# Patient Record
Sex: FEMALE | Race: WHITE | Employment: UNEMPLOYED | URBAN - METROPOLITAN AREA
[De-identification: names, ages, dates, MRNs, and addresses within clinical notes are randomized per-mention and may not be internally consistent; named-entity substitution may affect disease eponyms.]

---

## 2017-02-15 ENCOUNTER — HOSPITAL ENCOUNTER (OUTPATIENT)
Dept: WOMENS IMAGING | Age: 72
Discharge: HOME | End: 2017-02-15
Attending: OBSTETRICS & GYNECOLOGY | Admitting: OBSTETRICS & GYNECOLOGY

## 2017-02-15 DIAGNOSIS — Z12.31 VISIT FOR SCREENING MAMMOGRAM: ICD-10-CM

## 2017-11-16 ENCOUNTER — HOSPITAL ENCOUNTER (OUTPATIENT)
Dept: CT IMAGING | Age: 72
Discharge: HOME | End: 2017-11-16
Attending: UROLOGY | Admitting: UROLOGY

## 2017-11-16 DIAGNOSIS — N15.1 ABSCESS OF LEFT KIDNEY: ICD-10-CM

## 2017-11-16 DIAGNOSIS — N15.1 RENAL AND PERINEPHRIC ABSCESS: ICD-10-CM

## 2017-11-16 LAB
GFR AFRICAN AMERICAN: >60
GFR NON-AFRICAN AMERICAN: >60
PERFORMED ON: NORMAL
POC CREATININE: 0.9 MG/DL (ref 0.6–1.2)
POC SAMPLE TYPE: NORMAL

## 2018-02-19 ENCOUNTER — HOSPITAL ENCOUNTER (OUTPATIENT)
Dept: WOMENS IMAGING | Age: 73
Discharge: HOME | End: 2018-02-19
Attending: OBSTETRICS & GYNECOLOGY | Admitting: OBSTETRICS & GYNECOLOGY

## 2018-02-19 DIAGNOSIS — Z12.31 VISIT FOR SCREENING MAMMOGRAM: ICD-10-CM

## 2019-04-10 ENCOUNTER — HOSPITAL ENCOUNTER (OUTPATIENT)
Dept: WOMENS IMAGING | Age: 74
Discharge: HOME | End: 2019-04-10
Payer: MEDICARE

## 2019-04-10 DIAGNOSIS — Z12.39 BREAST CANCER SCREENING: ICD-10-CM

## 2019-04-10 PROCEDURE — 77063 BREAST TOMOSYNTHESIS BI: CPT

## 2019-08-16 ENCOUNTER — ANESTHESIA EVENT (OUTPATIENT)
Dept: ENDOSCOPY | Age: 74
End: 2019-08-16
Payer: MEDICARE

## 2019-08-19 ENCOUNTER — HOSPITAL ENCOUNTER (OUTPATIENT)
Age: 74
Setting detail: OUTPATIENT SURGERY
Discharge: HOME | End: 2019-08-19
Attending: INTERNAL MEDICINE | Admitting: INTERNAL MEDICINE
Payer: MEDICARE

## 2019-08-19 ENCOUNTER — ANESTHESIA (OUTPATIENT)
Dept: ENDOSCOPY | Age: 74
End: 2019-08-19
Payer: MEDICARE

## 2019-08-19 VITALS
OXYGEN SATURATION: 94 % | BODY MASS INDEX: 26.05 KG/M2 | DIASTOLIC BLOOD PRESSURE: 68 MMHG | SYSTOLIC BLOOD PRESSURE: 127 MMHG | WEIGHT: 156.38 LBS | RESPIRATION RATE: 16 BRPM | HEART RATE: 67 BPM | HEIGHT: 65 IN | TEMPERATURE: 97 F

## 2019-08-19 VITALS — DIASTOLIC BLOOD PRESSURE: 75 MMHG | OXYGEN SATURATION: 94 % | SYSTOLIC BLOOD PRESSURE: 98 MMHG

## 2019-08-19 PROCEDURE — 7100000010 HC PHASE II RECOVERY - FIRST 15 MIN: Performed by: INTERNAL MEDICINE

## 2019-08-19 PROCEDURE — 2580000003 HC RX 258: Performed by: ANESTHESIOLOGY

## 2019-08-19 PROCEDURE — 7100000011 HC PHASE II RECOVERY - ADDTL 15 MIN: Performed by: INTERNAL MEDICINE

## 2019-08-19 PROCEDURE — 2500000003 HC RX 250 WO HCPCS: Performed by: NURSE ANESTHETIST, CERTIFIED REGISTERED

## 2019-08-19 PROCEDURE — 6360000002 HC RX W HCPCS: Performed by: NURSE ANESTHETIST, CERTIFIED REGISTERED

## 2019-08-19 PROCEDURE — 3609009500 HC COLONOSCOPY DIAGNOSTIC OR SCREENING: Performed by: INTERNAL MEDICINE

## 2019-08-19 PROCEDURE — 3700000000 HC ANESTHESIA ATTENDED CARE: Performed by: INTERNAL MEDICINE

## 2019-08-19 PROCEDURE — 3700000001 HC ADD 15 MINUTES (ANESTHESIA): Performed by: INTERNAL MEDICINE

## 2019-08-19 RX ORDER — SODIUM CHLORIDE 0.9 % (FLUSH) 0.9 %
10 SYRINGE (ML) INJECTION PRN
Status: DISCONTINUED | OUTPATIENT
Start: 2019-08-19 | End: 2019-08-19 | Stop reason: HOSPADM

## 2019-08-19 RX ORDER — PROPOFOL 10 MG/ML
INJECTION, EMULSION INTRAVENOUS PRN
Status: DISCONTINUED | OUTPATIENT
Start: 2019-08-19 | End: 2019-08-19 | Stop reason: SDUPTHER

## 2019-08-19 RX ORDER — SODIUM CHLORIDE 9 MG/ML
INJECTION, SOLUTION INTRAVENOUS CONTINUOUS
Status: DISCONTINUED | OUTPATIENT
Start: 2019-08-19 | End: 2019-08-19 | Stop reason: HOSPADM

## 2019-08-19 RX ORDER — SODIUM CHLORIDE 0.9 % (FLUSH) 0.9 %
10 SYRINGE (ML) INJECTION EVERY 12 HOURS SCHEDULED
Status: DISCONTINUED | OUTPATIENT
Start: 2019-08-19 | End: 2019-08-19 | Stop reason: HOSPADM

## 2019-08-19 RX ORDER — LIDOCAINE HYDROCHLORIDE 20 MG/ML
INJECTION, SOLUTION EPIDURAL; INFILTRATION; INTRACAUDAL; PERINEURAL PRN
Status: DISCONTINUED | OUTPATIENT
Start: 2019-08-19 | End: 2019-08-19 | Stop reason: SDUPTHER

## 2019-08-19 RX ADMIN — PROPOFOL 20 MG: 10 INJECTION, EMULSION INTRAVENOUS at 08:45

## 2019-08-19 RX ADMIN — PROPOFOL 20 MG: 10 INJECTION, EMULSION INTRAVENOUS at 08:34

## 2019-08-19 RX ADMIN — PROPOFOL 10 MG: 10 INJECTION, EMULSION INTRAVENOUS at 08:37

## 2019-08-19 RX ADMIN — PROPOFOL 20 MG: 10 INJECTION, EMULSION INTRAVENOUS at 08:44

## 2019-08-19 RX ADMIN — PROPOFOL 20 MG: 10 INJECTION, EMULSION INTRAVENOUS at 08:41

## 2019-08-19 RX ADMIN — PROPOFOL 40 MG: 10 INJECTION, EMULSION INTRAVENOUS at 08:38

## 2019-08-19 RX ADMIN — PROPOFOL 20 MG: 10 INJECTION, EMULSION INTRAVENOUS at 08:47

## 2019-08-19 RX ADMIN — SODIUM CHLORIDE: 0.9 INJECTION, SOLUTION INTRAVENOUS at 08:08

## 2019-08-19 RX ADMIN — LIDOCAINE HYDROCHLORIDE 60 MG: 20 INJECTION, SOLUTION EPIDURAL; INFILTRATION; INTRACAUDAL; PERINEURAL at 08:32

## 2019-08-19 RX ADMIN — PROPOFOL 40 MG: 10 INJECTION, EMULSION INTRAVENOUS at 08:32

## 2019-08-19 ASSESSMENT — PAIN SCALES - WONG BAKER
WONGBAKER_NUMERICALRESPONSE: 0
WONGBAKER_NUMERICALRESPONSE: 0

## 2019-08-19 ASSESSMENT — LIFESTYLE VARIABLES: SMOKING_STATUS: 0

## 2019-08-19 ASSESSMENT — PAIN - FUNCTIONAL ASSESSMENT: PAIN_FUNCTIONAL_ASSESSMENT: 0-10

## 2019-08-19 ASSESSMENT — PAIN SCALES - GENERAL: PAINLEVEL_OUTOF10: 0

## 2020-07-10 ENCOUNTER — HOSPITAL ENCOUNTER (OUTPATIENT)
Dept: WOMENS IMAGING | Age: 75
Discharge: HOME | End: 2020-07-10
Payer: MEDICARE

## 2020-07-10 PROCEDURE — 77063 BREAST TOMOSYNTHESIS BI: CPT

## 2020-08-27 ENCOUNTER — HOSPITAL ENCOUNTER (OUTPATIENT)
Dept: ULTRASOUND IMAGING | Age: 75
Discharge: HOME | End: 2020-08-27
Payer: MEDICARE

## 2020-08-27 PROCEDURE — 76770 US EXAM ABDO BACK WALL COMP: CPT

## 2020-09-18 ENCOUNTER — HOSPITAL ENCOUNTER (OUTPATIENT)
Dept: CT IMAGING | Age: 75
Discharge: HOME | End: 2020-09-18
Payer: MEDICARE

## 2020-09-18 PROCEDURE — 74178 CT ABD&PLV WO CNTR FLWD CNTR: CPT

## 2020-09-18 PROCEDURE — 82565 ASSAY OF CREATININE: CPT

## 2020-09-18 PROCEDURE — 6360000004 HC RX CONTRAST MEDICATION: Performed by: UROLOGY

## 2020-09-18 RX ADMIN — IOPAMIDOL 75 ML: 755 INJECTION, SOLUTION INTRAVENOUS at 09:40

## 2021-07-12 ENCOUNTER — HOSPITAL ENCOUNTER (OUTPATIENT)
Dept: WOMENS IMAGING | Age: 76
Discharge: HOME | End: 2021-07-12
Payer: MEDICARE

## 2021-07-12 DIAGNOSIS — Z12.31 VISIT FOR SCREENING MAMMOGRAM: ICD-10-CM

## 2021-07-12 PROCEDURE — 77063 BREAST TOMOSYNTHESIS BI: CPT

## 2021-08-01 ENCOUNTER — APPOINTMENT (OUTPATIENT)
Dept: GENERAL RADIOLOGY | Age: 76
End: 2021-08-01
Payer: MEDICARE

## 2021-08-01 ENCOUNTER — HOSPITAL ENCOUNTER (EMERGENCY)
Age: 76
Discharge: HOME | End: 2021-08-01
Attending: EMERGENCY MEDICINE
Payer: MEDICARE

## 2021-08-01 VITALS
WEIGHT: 150 LBS | OXYGEN SATURATION: 93 % | TEMPERATURE: 98.7 F | HEIGHT: 64 IN | SYSTOLIC BLOOD PRESSURE: 125 MMHG | HEART RATE: 88 BPM | BODY MASS INDEX: 25.61 KG/M2 | DIASTOLIC BLOOD PRESSURE: 66 MMHG | RESPIRATION RATE: 16 BRPM

## 2021-08-01 DIAGNOSIS — S62.91XA CLOSED FRACTURE OF RIGHT HAND, INITIAL ENCOUNTER: Primary | ICD-10-CM
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
## 2021-08-01 PROCEDURE — 73130 X-RAY EXAM OF HAND: CPT

## 2021-08-01 PROCEDURE — 29125 APPL SHORT ARM SPLINT STATIC: CPT

## 2021-08-01 PROCEDURE — 99283 EMERGENCY DEPT VISIT LOW MDM: CPT

## 2021-08-01 ASSESSMENT — PAIN DESCRIPTION - ONSET: ONSET: SUDDEN

## 2021-08-01 ASSESSMENT — PAIN DESCRIPTION - LOCATION: LOCATION: HAND

## 2021-08-01 ASSESSMENT — PAIN DESCRIPTION - FREQUENCY: FREQUENCY: CONTINUOUS

## 2021-08-01 ASSESSMENT — PAIN DESCRIPTION - PAIN TYPE: TYPE: ACUTE PAIN

## 2021-08-01 ASSESSMENT — PAIN DESCRIPTION - ORIENTATION: ORIENTATION: RIGHT

## 2021-08-01 ASSESSMENT — PAIN SCALES - GENERAL: PAINLEVEL_OUTOF10: 5

## 2021-08-02 ENCOUNTER — TELEPHONE (OUTPATIENT)
Dept: ORTHOPEDIC SURGERY | Age: 76
End: 2021-08-02

## 2021-08-02 NOTE — ED PROVIDER NOTES
ED Attending Attestation Note     Date of evaluation: 8/1/2021    This patient was seen by the resident. I have seen and examined the patient, agree with the workup, evaluation, management and diagnosis. The care plan has been discussed. My assessment reveals a very well-appearing elderly patient, pleasantly conversational, in no acute distress. She presents with pain and swelling primarily over the dorsal ulnar aspect of the right hand, with associated tenderness to palpation. She has no tenderness over the wrist, forearm, or shoulder, and denies any other areas of pain. Her hand is neurovascularly intact distally. Plain films show nondisplaced fractures at the bases of the right fourth and fifth proximal phalanges. I did supervise and participate in the placement of an ulnar gutter splint, together with the resident physician.          Jose Mckinnon MD  08/05/21 3039

## 2021-08-02 NOTE — ED PROVIDER NOTES
[facility] RESIDENT NOTE       Date of evaluation: 8/1/2021    Chief Complaint     Fall (with right hand pain. last 2 knuckles are red and swollen. Fall was from a bent over position, and fell into a shrub and mulch. denies hitting head or LOC or blood thinners. States that she just lost her balance. )      of Present Illness     Kamryn Blankenship is a 67 y.o. female, history of hypertension, anxiety, hyperlipidemia, who presents to the emergency department for evaluation of hand pain. Patient states that she was attempting to pull weeds when she fell onto her right side, landing on her right hand. She reportedly lay on the ground for approximately 30 minutes, as patient was unable to get up, which is reportedly not atypical per her  who is at the bedside. Patient has reportedly experienced mechanical falls in the past and required assistance to get up. Since the incident, patient has been experiencing increased pain and swelling to the right hand, and states that she had difficulty using utensils at dinner tonight secondary to pain, prompting her presentation to the emergency department. Upon arrival, patient rates her discomfort as 4/10 in severity, with movement of the hand exacerbating her discomfort. She reports mild \"tingling\" in the third, fourth and fifth digits on the right hand. Patient is right-handed. She denies any elbow pain, shoulder pain, head trauma, LOC, neck pain, abdominal pain, chest pain, shortness of breath, or any other injuries as result of the incident. She was reportedly in her normal state of health prior to the incident, denying any preceding lightheadedness, headache, chest pain, or shortness of breath. Review of Systems   A complete review of systems was conducted on this patient and was negative except as noted in the HPI.   Review of Systems    Past Medical, Surgical, Family, and Social History     She has a past medical history of Anxiety, Balance disorder, GERD (gastroesophageal reflux disease), History of kidney stones, Hx of blood clots, Hyperlipidemia, Hypertension, Infection, Liver cyst, Olfactory nerve injury, Right ear injury, and Thyroid disease. She has a past surgical history that includes Abdominoplasty (2012); Hysterectomy (1989); Facial cosmetic surgery (1986); Ovarian cyst surgery (1987); Uterine fibroid surgery (1974); back surgery (1998); Gastric Band (2011); Colonoscopy; Facial cosmetic surgery ([id number]); Cosmetic surgery; Cholecystectomy; Endoscopy, colon, diagnostic; eye surgery; other surgical history (11/24/2015); and Colonoscopy (N/A, 8/19/2019). Her family history is not on file. She reports that she has never smoked. She has never used smokeless tobacco. She reports that she does not drink alcohol and does not use drugs. Medications     Discharge Medication List as of 8/1/2021  9:48 PM      CONTINUE these medications which have NOT CHANGED    Details   metFORMIN (GLUCOPHAGE) 500 MG tablet Historical Med      !! lovastatin (MEVACOR) 40 MG tablet Historical Med      levothyroxine (SYNTHROID) 50 MCG tablet Historical Med      oxyCODONE-acetaminophen (PERCOCET) 5-325 MG per tablet Take 1 tablet by mouth every 4 hours as needed for Pain, Disp-30 tablet, R-0      valsartan-hydrochlorothiazide (DIOVAN-HCT) 160-25 MG per tablet Take 1 tablet by mouth daily      UNABLE TO FIND Nature-Thyroid 32.5 mg daily      oxyCODONE-acetaminophen (PERCOCET) 7.5-325 MG per tablet Take 1 tablet by mouth 2 times daily as needed for Pain      ibuprofen (ADVIL;MOTRIN) 200 MG tablet Take 200 mg by mouth every 6 hours as needed for Pain      docusate sodium (COLACE) 100 MG capsule Take 100 mg by mouth nightly. !! lovastatin (MEVACOR) 20 MG tablet Take 20 mg by mouth nightly. sertraline (ZOLOFT) 100 MG tablet Take 100 mg by mouth nightly. !! - Potential duplicate medications found.  Please discuss with provider. Allergies     She is allergic to demerol, erythromycin, and pcn [penicillins]. Physical Exam     INITIAL VITALS: BP: 125/66, Temp: 98.7 °F (37.1 °C), Pulse: 88, Resp: 16, SpO2: 93 %   Physical Exam  Vitals and nursing note reviewed. Constitutional:       General: She is not in acute distress. Appearance: Normal appearance. She is not ill-appearing or toxic-appearing. HENT:      Head: Normocephalic and atraumatic. Mouth/Throat:      Mouth: Mucous membranes are moist.   Eyes:      Conjunctiva/sclera: Conjunctivae normal.      Pupils: Pupils are equal, round, and reactive to light. Cardiovascular:      Rate and Rhythm: Normal rate and regular rhythm. Pulses: Normal pulses. Heart sounds: Normal heart sounds. Pulmonary:      Effort: Pulmonary effort is normal.      Breath sounds: Normal breath sounds. Abdominal:      Palpations: Abdomen is soft. Tenderness: There is no abdominal tenderness. There is no guarding. Musculoskeletal:      Right hand: Swelling (ulnar aspect), tenderness and bony tenderness present. Decreased range of motion. Decreased strength (2/2 pain). Normal capillary refill. Normal pulse. Cervical back: Normal range of motion. No rigidity or tenderness. Neurological:      General: No focal deficit present. Mental Status: She is alert. DiagnosticResults   RADIOLOGY:  XR HAND RIGHT (MIN 3 VIEWS)   Final Result      Possible acute fractures involving the bases of the right fourth and fifth proximal phalanges. Correlate for tenderness to palpation at this location. LABS:   No results found for this visit on 08/01/21. RECENT VITALS:  BP: 125/66, Temp: 98.7 °F (37.1 °C), Pulse: 88,Resp: 16, SpO2: 93 %     Procedures     Splint Application    Date/Time: 8/2/2021 3:38 AM  Performed by: Jayson Cabrera MD  Authorized by:  Selwyn Franco MD     Consent:     Consent obtained:  Verbal    Consent given by:  Patient    Risks discussed:  Numbness, pain and swelling  Pre-procedure details:     Sensation:  Normal    Skin color:  Ecchymosis  Procedure details:     Laterality:  Right    Location:  Hand    Hand:  R hand    Splint type:  Ulnar gutter    Supplies:  Ortho-Glass  Post-procedure details:     Pain:  Improved    Sensation:  Normal    Patient tolerance of procedure: Tolerated well, no immediate complications        ED Course     Nursing Notes, Past Medical Hx, Past Surgical Hx, Social Hx, Allergies, and Family Hx were reviewed. The patient was given the followingmedications:  No orders of the defined types were placed in this encounter. CONSULTS:  None    MEDICAL DECISION MAKING / ASSESSMENT / PLAN     Ran Cano is a 67 y.o. female, history of hypertension, anxiety, hyperlipidemia, presenting to the emergency department for evaluation of hand pain following a ground-level fall. Patient was attempting to pull weeds when she fell down, landing on her right side, sustaining an injury to her right hand. This occurred approximately 8 hours prior to arrival.  Her discomfort in the hand has gradually worsened, with associated swelling. She had difficulty eating dinner tonight secondary to the pain prompting her presentation to the emergency department. Fall sounds entirely mechanical, as patient adamantly denies any symptoms preceding the fall, and did not strike her head or experiencing LOC. Upon arrival, patient is well-appearing, afebrile and hemodynamically stable. Dilation of the right hand reveals swelling on medial aspect of the hand on both the volar and dorsal aspect. She has decreased strength secondary to pain and is endorsing mild tingling in the third, fourth, and fifth digits. She has normal capillary refill distal to the swelling. X-rays of the right hand were obtained which revealed fractures along the base of the fourth and fifth metacarpals.     She had no other gross injuries on exam and was complaining of no other discomfort, therefore no further imaging was obtained. Did not strike her head or experience LOC, therefore no head CT was indicated. Patient was placed in a ulnar gutter splint and referred to hand surgery for follow-up. This patient was also evaluated by the attending physician. All care plans werediscussed and agreed upon. Clinical Impression     1. Closed fracture of right hand, initial encounter    2.  Fall from ground level        Disposition     PATIENT REFERRED TO:  Russel Andrea MD  [address] [place] [address]  [address] [place] [address]  [phone]    Call   Tuesday, call tomorrow to confirm your appointment      DISCHARGE MEDICATIONS:  Discharge Medication List as of 8/1/2021  9:48 PM          DISPOSITION         Mary Warren MD  Resident  08/02/21 8219

## 2021-08-02 NOTE — ED NOTES
Patient prepared for and ready to be discharged. Patient discharged at this time in no acute distress after verbalizing understanding of discharge instructions. Patient left after receiving After Visit Summary instructions.       Marc Davila RN  08/01/21 0563

## 2021-08-02 NOTE — TELEPHONE ENCOUNTER
Called patient and her  back. Schedule an appointment tomorrow at [facility] for [phone]. They were given the address and informed to arrive early for check, paperwork and to have the ID and insurance card. They were also told that we are working them in that there will likely be a longer than normal wait time. They understood and still requested to proceed with the appointment scheduling. No further questions at this time.

## 2021-08-03 ENCOUNTER — OFFICE VISIT (OUTPATIENT)
Dept: ORTHOPEDIC SURGERY | Age: 76
End: 2021-08-03
Payer: MEDICARE

## 2021-08-03 VITALS — RESPIRATION RATE: 16 BRPM | BODY MASS INDEX: 25.61 KG/M2 | HEIGHT: 64 IN | WEIGHT: 150 LBS

## 2021-08-03 DIAGNOSIS — S69.91XA HAND INJURY, RIGHT, INITIAL ENCOUNTER: Primary | ICD-10-CM

## 2021-08-03 PROCEDURE — 26720 TREAT FINGER FRACTURE EACH: CPT | Performed by: ORTHOPAEDIC SURGERY

## 2021-08-03 PROCEDURE — L3809 WHFO W/O JOINTS PRE OTS: HCPCS | Performed by: ORTHOPAEDIC SURGERY

## 2021-08-03 PROCEDURE — 99203 OFFICE O/P NEW LOW 30 MIN: CPT | Performed by: ORTHOPAEDIC SURGERY

## 2021-08-04 NOTE — PROGRESS NOTES
CHIEF COMPLAINT: Right hand pain/ ring finger and short (pinkie) finger proximal phalanx base fracture. DATE OF INJURY: 8/1/2021, DOT 8/3/2021    HISTORY:  Ms. Ran Dennison 67 y.o.  female right handed presents today for the first visit for evaluation of a right hand injury which occurred when she fell as she was pulling weeds. She is complaining of ring finger and short (pinkie) finger pain and swelling. This is better with elevation and worse with ROM. The pain is sharp and not radiating. No other complaint.  She was seen at [facility], was evaluated and splinted and asked to see orthopedics    Past Medical History:   Diagnosis Date    Anxiety     Balance disorder     unknown etiology    GERD (gastroesophageal reflux disease)     History of kidney stones     Hx of blood clots     x 1 from use of BCPs    Hyperlipidemia     Hypertension     Infection 11/2015    gastric band    Liver cyst     \"possible\"    Olfactory nerve injury     mostly resolved    Right ear injury 20 yrs ago    Thyroid disease        Past Surgical History:   Procedure Laterality Date    ABDOMINOPLASTY  2012    also cosmetic surgery on arms    BACK SURGERY  1998    exc. cyst lumbar    CHOLECYSTECTOMY      COLONOSCOPY      COLONOSCOPY N/A 8/19/2019    COLONOSCOPY DIAGNOSTIC performed by Viki Brannon MD at [address] [place] [address]      breast reduction    ENDOSCOPY, COLON, DIAGNOSTIC      EYE SURGERY      cataracts    [facility]ACIAL COSMETIC [facility]    also breast reduction    FACIAL COSMETIC SURGERY  [id number]     FACELIFT, BROWLIFT, UPPER AND LOWER BLEPHEROPLASTY AND    GASTRIC BAND  2011    also port removal subsequently (3/2015)   [address]  11/24/2015    LAPAROSCOPIC GASTRIC BAND REMOVAL         OVARIAN CYST SURGERY  1987    UTERINE FIBROID SURGERY  1974       Social History     Socioeconomic History    Marital status:      Spouse name: Not on file    Number of children: Not on file    Years of education: Not on file    Highest education level: Not on file   Occupational History    Not on file   Tobacco Use    Smoking status: Never Smoker    Smokeless tobacco: Never Used   Substance and Sexual Activity    Alcohol use: No    Drug use: No    Sexual activity: Not on file   Other Topics Concern    Not on file   Social History Narrative    Not on file     Social Determinants of Health     Financial Resource Strain:     Difficulty of Paying Living Expenses:    Food Insecurity:     Worried About Running Out of Food in the Last Year:     [address] in the Last Year:    Transportation Needs:     Lack of Transportation (Medical):  Lack of Transportation (Non-Medical):    Physical Activity:     Days of Exercise per Week:     Minutes of Exercise per Session:    Stress:     Feeling of Stress :    Social Connections:     Frequency of Communication with Friends and Family:     Frequency of Social Gatherings with Friends and Family:     Attends Mu-[employer] Services:     Active Member of Clubs or Organizations:     Attends Club or Organization Meetings:     Marital Status:    Intimate Partner Violence:     Fear of Current or Ex-Partner:     Emotionally Abused:     Physically Abused:     Sexually Abused:        History reviewed. No pertinent family history.     Current Outpatient Medications on File Prior to Visit   Medication Sig Dispense Refill    metFORMIN (GLUCOPHAGE) 500 MG tablet       lovastatin (MEVACOR) 40 MG tablet       levothyroxine (SYNTHROID) 50 MCG tablet       oxyCODONE-acetaminophen (PERCOCET) 5-325 MG per tablet Take 1 tablet by mouth every 4 hours as needed for Pain 30 tablet 0    valsartan-hydrochlorothiazide (DIOVAN-HCT) 160-25 MG per tablet Take 1 tablet by mouth daily      UNABLE TO FIND Nature-Thyroid 32.5 mg daily      oxyCODONE-acetaminophen (PERCOCET) 7.5-325 MG per tablet Take 1 tablet by mouth 2 times daily as needed for Pain      ibuprofen (ADVIL;MOTRIN) 200 MG tablet Take 200 mg by mouth every 6 hours as needed for Pain      docusate sodium (COLACE) 100 MG capsule Take 100 mg by mouth nightly.  lovastatin (MEVACOR) 20 MG tablet Take 20 mg by mouth nightly.  sertraline (ZOLOFT) 100 MG tablet Take 100 mg by mouth nightly. No current facility-administered medications on file prior to visit. Pertinent items are noted in HPI  Review of systems reviewed from Patient History Form dated on 8/3/2021 and available in the patient's chart under the Media tab. No change noted. PHYSICAL EXAMINATION:  Ms. Amanda Max is a very pleasant 67 y.o.  female who presents today in no acute distress, awake, alert, and oriented. She is well dressed, nourished and  groomed. Patient with normal affect. Height is  5' 4\" (1.626 m), weight is 150 lb (68 kg), Body mass index is 25.75 kg/m². Resting respiratory rate is 16. Examination of the gait, showed that the patient walks with No limp . Examination of both Upper extremities showing a decreased range of motion of the right ring finger and short (pinkie) finger compare to the other side. There is moderate swelling that can be seen, as well as ecchymosis of the ring finger and short (pinkie) finger. She has intact sensation and good radial pulses. She has significant tenderness on deep palpation over the proximal phalanx of the ring finger and short (pinkie) finger right hand. There is no rotational deformity of the right ring finger and short (pinkie) finger. IMAGING: Aliya George were reviewed, dated 8/1/2021,  3 views of the right hand, and showed a ring finger and short (pinkie) finger proximal phalanx base minimally displaced fracture. IMPRESSION: Right hand ring finger and short (pinkie) finger proximal phalanx base minimally displaced fracture.     PLAN:  I discussed that the overall alignment of this fracture is good and that we can try to treat this non-operatively in a finger splint. We discussed the risk of nonunion and or malunion. We will see her  back in 6 weeks at which time we will get a new xray of the right hand. Procedures    Dottie Do TKO     Patient was prescribed a Dottie Do TKO. The right hand will require stabilization / immobilization from this semi-rigid / rigid orthosis to improve their function. The orthosis will assist in protecting the affected area, provide functional support and facilitate healing. The patient was educated and fit by a healthcare professional with expert knowledge and specialization in brace application while under the direct supervision of the physician. Verbal and written instructions for the use of and application of this item were provided. They were instructed to contact the office immediately should the brace result in increased pain, decreased sensation, increased swelling or worsening of the condition.      Jose Alonzo MD

## 2021-09-10 ENCOUNTER — OFFICE VISIT (OUTPATIENT)
Dept: ORTHOPEDIC SURGERY | Age: 76
End: 2021-09-10

## 2021-09-10 VITALS — HEIGHT: 64 IN | WEIGHT: 150 LBS | RESPIRATION RATE: 16 BRPM | BODY MASS INDEX: 25.61 KG/M2

## 2021-09-10 DIAGNOSIS — S62.614A CLOSED DISPLACED FRACTURE OF PROXIMAL PHALANX OF RIGHT RING FINGER, INITIAL ENCOUNTER: ICD-10-CM

## 2021-09-10 DIAGNOSIS — S69.91XA HAND INJURY, RIGHT, INITIAL ENCOUNTER: Primary | ICD-10-CM

## 2021-09-10 PROCEDURE — APPNB15 APP NON BILLABLE TIME 0-15 MINS: Performed by: NURSE PRACTITIONER

## 2021-09-10 PROCEDURE — 99024 POSTOP FOLLOW-UP VISIT: CPT | Performed by: ORTHOPAEDIC SURGERY

## 2021-09-12 NOTE — PROGRESS NOTES
CHIEF COMPLAINT: Right hand pain/ ring finger and short (pinkie) finger proximal phalanx base fracture. DATE OF INJURY: 8/1/2021, DOT 8/3/2021    HISTORY:  Ms. Abraham Escobar 67 y.o.  female right handed presents today for follow up visit for evaluation of a right hand injury which occurred when she fell as she was pulling weeds. She is complaining of ring finger and short (pinkie) finger pain and swelling that is improving and denies any pain today. No other complaint. She was seen at [facility], was evaluated and splinted and asked to see orthopedics. She is in a brace. Denies smoking.      Past Medical History:   Diagnosis Date    Anxiety     Balance disorder     unknown etiology    GERD (gastroesophageal reflux disease)     History of kidney stones     Hx of blood clots     x 1 from use of BCPs    Hyperlipidemia     Hypertension     Infection 11/2015    gastric band    Liver cyst     \"possible\"    Olfactory nerve injury     mostly resolved    Right ear injury 20 yrs ago    Thyroid disease        Past Surgical History:   Procedure Laterality Date    ABDOMINOPLASTY  2012    also cosmetic surgery on arms    BACK SURGERY  1998    exc. cyst lumbar    CHOLECYSTECTOMY      COLONOSCOPY      COLONOSCOPY N/A 8/19/2019    COLONOSCOPY DIAGNOSTIC performed by Emmie Young MD at [address] [place] [address]      breast reduction    ENDOSCOPY, COLON, DIAGNOSTIC      EYE SURGERY      cataracts    FACIAL COSMETIC [facility]    also breast reduction    FACIAL COSMETIC SURGERY  [id number]     FACELIFT, BROWLIFT, UPPER AND LOWER BLEPHEROPLASTY AND    GASTRIC BAND  2011    also port removal subsequently (3/2015)   [address]  11/24/2015    LAPAROSCOPIC GASTRIC BAND REMOVAL         OVARIAN CYST SURGERY  1987    UTERINE FIBROID SURGERY  1974       Social History     Socioeconomic History    Marital status:      Spouse name: Not on file    Number of children: Not on file    Years of education: Not on file    Highest education level: Not on file   Occupational History    Not on file   Tobacco Use    Smoking status: Never Smoker    Smokeless tobacco: Never Used   Substance and Sexual Activity    Alcohol use: No    Drug use: No    Sexual activity: Not on file   Other Topics Concern    Not on file   Social History Narrative    Not on file     Social Determinants of Health     Financial Resource Strain:     Difficulty of Paying Living Expenses:    Food Insecurity:     Worried About Running Out of Food in the Last Year:     [address] in the Last Year:    Transportation Needs:     Lack of Transportation (Medical):  Lack of Transportation (Non-Medical):    Physical Activity:     Days of Exercise per Week:     Minutes of Exercise per Session:    Stress:     Feeling of Stress :    Social Connections:     Frequency of Communication with Friends and Family:     Frequency of Social Gatherings with Friends and Family:     Attends [employer] Services:     Active Member of Clubs or Organizations:     Attends Club or Organization Meetings:     Marital Status:    Intimate Partner Violence:     Fear of Current or Ex-Partner:     Emotionally Abused:     Physically Abused:     Sexually Abused:        History reviewed. No pertinent family history.     Current Outpatient Medications on File Prior to Visit   Medication Sig Dispense Refill    metFORMIN (GLUCOPHAGE) 500 MG tablet       lovastatin (MEVACOR) 40 MG tablet       levothyroxine (SYNTHROID) 50 MCG tablet       oxyCODONE-acetaminophen (PERCOCET) 5-325 MG per tablet Take 1 tablet by mouth every 4 hours as needed for Pain 30 tablet 0    valsartan-hydrochlorothiazide (DIOVAN-HCT) 160-25 MG per tablet Take 1 tablet by mouth daily      UNABLE TO FIND Nature-Thyroid 32.5 mg daily      oxyCODONE-acetaminophen (PERCOCET) 7.5-325 MG per tablet Take 1 tablet by mouth 2 times daily as needed for Pain      ibuprofen (ADVIL;MOTRIN) 200 MG tablet Take 200 mg by mouth every 6 hours as needed for Pain      docusate sodium (COLACE) 100 MG capsule Take 100 mg by mouth nightly.  lovastatin (MEVACOR) 20 MG tablet Take 20 mg by mouth nightly.  sertraline (ZOLOFT) 100 MG tablet Take 100 mg by mouth nightly. No current facility-administered medications on file prior to visit. Pertinent items are noted in HPI  Review of systems reviewed from Patient History Form dated on 8/3/2021 and available in the patient's chart under the Media tab. No change noted. PHYSICAL EXAMINATION:  Ms. Rylee Elliott is a very pleasant 67 y.o.  female who presents today in no acute distress, awake, alert, and oriented. She is well dressed, nourished and  groomed. Patient with normal affect. Height is  5' 4\" (1.626 m), weight is 150 lb (68 kg), Body mass index is 25.75 kg/m². Resting respiratory rate is 16. Examination of the gait, showed that the patient walks with no limp . Examination of both upper extremities showing a decreased range of motion of the right ring finger and short (pinkie) finger compare to the other side. There is mild swelling that can be seen, no ecchymosis of the ring finger and short (pinkie) finger. She has intact sensation and good radial pulses. She has mild tenderness on deep palpation over the proximal phalanx of the ring finger and short (pinkie) finger right hand. There is no rotational deformity of the right ring finger and short (pinkie) finger. IMAGING: Emily Munoz were reviewed, dated today in office,  3 views of the right hand, and showed a ring finger and short (pinkie) finger proximal phalanx base minimally displaced fracture. IMPRESSION: Right hand ring finger and short (pinkie) finger proximal phalanx base minimally displaced fracture. PLAN:  I discussed that the overall alignment of this fracture is good.  She can discontinue the brace and work on ROM and strengthening exercises. She would like to do it on her own. We will see her  back in 6 weeks at which time we will get a new xray of the right hand.         Sameer Xie, SARA - CNP

## 2021-10-11 ENCOUNTER — OFFICE VISIT (OUTPATIENT)
Dept: PULMONOLOGY | Age: 76
End: 2021-10-11
Payer: MEDICARE

## 2021-10-11 VITALS
BODY MASS INDEX: 27.66 KG/M2 | HEART RATE: 64 BPM | DIASTOLIC BLOOD PRESSURE: 68 MMHG | TEMPERATURE: 97.7 F | HEIGHT: 64 IN | WEIGHT: 162 LBS | SYSTOLIC BLOOD PRESSURE: 106 MMHG | OXYGEN SATURATION: 92 % | RESPIRATION RATE: 16 BRPM

## 2021-10-11 DIAGNOSIS — J98.6 ELEVATED DIAPHRAGM: ICD-10-CM

## 2021-10-11 DIAGNOSIS — G47.10 HYPERSOMNIA: Primary | ICD-10-CM

## 2021-10-11 DIAGNOSIS — J98.6 ELEVATED HEMIDIAPHRAGM: ICD-10-CM

## 2021-10-11 DIAGNOSIS — R09.02 HYPOXEMIA: ICD-10-CM

## 2021-10-11 DIAGNOSIS — Z23 NEEDS FLU SHOT: ICD-10-CM

## 2021-10-11 DIAGNOSIS — R93.1 ECHOCARDIOGRAM ABNORMAL: ICD-10-CM

## 2021-10-11 DIAGNOSIS — Z78.9 POOR HISTORIAN: ICD-10-CM

## 2021-10-11 PROCEDURE — 99204 OFFICE O/P NEW MOD 45 MIN: CPT | Performed by: INTERNAL MEDICINE

## 2021-10-11 PROCEDURE — G0008 ADMIN INFLUENZA VIRUS VAC: HCPCS | Performed by: INTERNAL MEDICINE

## 2021-10-11 PROCEDURE — 90694 VACC AIIV4 NO PRSRV 0.5ML IM: CPT | Performed by: INTERNAL MEDICINE

## 2021-10-11 RX ORDER — M-VIT,TX,IRON,MINS/CALC/FOLIC 27MG-0.4MG
1 TABLET ORAL DAILY
COMMUNITY

## 2021-10-11 RX ORDER — ESTRADIOL 0.1 MG/G
2 CREAM VAGINAL DAILY
COMMUNITY

## 2021-10-11 RX ORDER — ASPIRIN 81 MG/1
81 TABLET, CHEWABLE ORAL DAILY
COMMUNITY

## 2021-10-11 NOTE — PROGRESS NOTES
REASON FOR CONSULTATION/CC:    Chief Complaint   Patient presents with    Other     NP ref by Dr Darius Sheikh for abnormal CT        Consult at request of   Shanna Paige MD for  hypoxemia    PCP: Shanna Paige MD    HISTORY OF PRESENT ILLNESS: Dave Melchor is a 67y.o. year old female with a         She was on vacation in [place] and had issues with dyspnea. She was told she had pulmonary edema and treaed with lasix but this did not help. [facility] then told her dyspnea was related to altitude and is now using oxygen tent       PH? Seeing cardiology weight loss drug taken in the 80s but does not remember medication she took. Denies history of smoking or cocaine use. She  Was discharged on oxygen 2 L NC and is currently using it as needed and at night. Hx . Snores, tired and some naps. Patient denies any sleep issues but then also states she is sleeping better with oxygen. REVIEW OF SYSTEMS:  Constitutional: Negative for fever    HENT: Negative for sore throat  Eyes: Negative for redness   Respiratory: + for dyspnea,- cough  Cardiovascular: Negative for chest pain  Gastrointestinal: Negative for vomiting, diarrhea   Genitourinary: Negative for hematuria   Musculoskeletal: Negative for arthralgias   Skin: Negative for rash  Neurological: Negative for syncope  Hematological: Negative for adenopathy  Psychiatric/Behavorial: Negative for anxiety      SOCIAL HISTORY:   reports that she has never smoked.  She has never used smokeless tobacco.    PAST MEDICAL HISTORY:  Past Medical History:   Diagnosis Date    Anxiety     Balance disorder     unknown etiology    GERD (gastroesophageal reflux disease)     History of kidney stones     Hx of blood clots     x 1 from use of BCPs    Hyperlipidemia     Hypertension     Infection 11/2015    gastric band    Liver cyst     \"possible\"    Olfactory nerve injury     mostly resolved    Right ear injury 20 yrs ago    Thyroid disease        PAST SURGICAL HISTORY:  Past Surgical History:   Procedure Laterality Date    ABDOMINOPLASTY  2012    also cosmetic surgery on arms    BACK SURGERY  1998    exc. cyst lumbar    CHOLECYSTECTOMY      COLONOSCOPY      COLONOSCOPY N/A 8/19/2019    COLONOSCOPY DIAGNOSTIC performed by Monica Stewart MD at [address] [place] [address]      breast reduction    ENDOSCOPY, COLON, DIAGNOSTIC      EYE SURGERY      cataracts    FACIAL COSMETIC SURGERY  1986    also breast reduction    FACIAL COSMETIC SURGERY  [id number]     FACELIFT, BROWLIFT, UPPER AND LOWER BLEPHEROPLASTY AND    GASTRIC BAND  2011    also port removal subsequently (3/2015)   [address] HISTORY  11/24/2015    LAPAROSCOPIC GASTRIC BAND REMOVAL         OVARIAN CYST SURGERY  1987    UTERINE FIBROID SURGERY  1974       FAMILY HISTORY:  family history is not on file. Objective:   PHYSICAL EXAM:  Blood pressure 106/68, pulse 64, temperature 97.7 °F (36.5 °C), temperature source Infrared, resp. rate 16, height 5' 4\" (1.626 m), weight 162 lb (73.5 kg), SpO2 92 %, not currently breastfeeding.'    Gen: No distress. Eyes: PERRL. No sclera icterus. No conjunctival injection. ENT: No discharge. Pharynx clear. External appearance of ears and nose normal.  Neck: Trachea midline. No obvious mass. Resp: No accessory muscle use. No crackles. No wheezes. No rhonchi. CV: Regular rate. Regular rhythm. No murmur or rub. No edema. GI:    Skin: Warm, dry, normal texture and turgor. No nodule on exposed extremities. Lymph: No cervical LAD. No supraclavicular LAD. M/S: No cyanosis. No clubbing. No joint deformity. Neuro: Moves all four extremities. Psych: Oriented x 3. No anxiety. Awake. Alert. Intact judgement and insight.     Current Outpatient Medications   Medication Sig Dispense Refill    aspirin 81 MG chewable tablet Take 81 mg by mouth daily      estradiol (ESTRACE) 0.1 MG/GM vaginal cream Place 2 g vaginally daily      Multiple Vitamins-Minerals (THERAPEUTIC MULTIVITAMIN-MINERALS) tablet Take 1 tablet by mouth daily      lovastatin (MEVACOR) 40 MG tablet       levothyroxine (SYNTHROID) 50 MCG tablet       valsartan-hydrochlorothiazide (DIOVAN-HCT) 160-25 MG per tablet Take 1 tablet by mouth daily      ibuprofen (ADVIL;MOTRIN) 200 MG tablet Take 200 mg by mouth every 6 hours as needed for Pain      docusate sodium (COLACE) 100 MG capsule Take 100 mg by mouth nightly.  sertraline (ZOLOFT) 100 MG tablet Take 100 mg by mouth nightly. No current facility-administered medications for this visit. Data Reviewed:     Category 1 Data points:      Last CBC  Lab Results   Component Value Date    WBC 6.3 11/23/2015    RBC 4.30 11/23/2015    HGB 13.3 11/23/2015    MCV 95.3 11/23/2015     11/23/2015     Last Renal  Lab Results   Component Value Date     11/23/2015    K 4.1 11/23/2015     11/23/2015    CO2 29 11/23/2015    CO2 25 05/28/2015    CO2 31 08/15/2012    BUN 16 11/23/2015    CREATININE 0.9 09/18/2020    CREATININE 0.7 11/23/2015    GLUCOSE 100 11/23/2015    CALCIUM 9.8 11/23/2015       Last ABG  POC Blood Gas: No results found for: POCPH, POCPCO2, POCPO2, POCHCO3, NBEA, LEXP3YPO  No results for input(s): PH, PCO2, PO2, HCO3, BE, O2SAT in the last 72 hours. Notes Reviewed: Outside records including discharge summary demonstrating lower extremity edema treated with Lasix discharged on oxygen. Radiology Review:  Pertinent images / reports were reviewed as a part of this visit. CT Chest w/ contrast: No results found for this or any previous visit. CT Chest w/o contrast: No results found for this or any previous visit. CTPA: No results found for this or any previous visit.       CXR PA/LAT: Results for orders placed during the hospital encounter of 07/27/11    X-ray chest PA and lateral    Narrative  FINDINGS- Two views of the chest show a normal of right lower lobe with right hemidiaphragm elevation concerning for right hemidiaphragm paralysis. No etiology of hypoxemia was uncovered during hospitalization in [place]. Echocardiogram, report not available, was dictated in the discharge summary was \" consistent with pulmonary hypertension\". This is possible etiology. Continue oxygen use at night and during the day as needed. Relevant Orders    6 Minute Walk Test    Hypersomnia - Primary      Patient is a poor historian and denies any sleep history. However,  states that she snores, persistent hypersomnia, with naps. Assess for sleep apnea with an in lab sleep study secondary to current nocturnal oxygen use. 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 Enlarged liver which may be leading to elevation. Sniff test ordered         Echocardiogram abnormal      Discharge shortly documented as\" consistent with pulmonary hypertension\" but does not elaborate. Echocardiogram report not available. With her history of weight loss drugs in the 80s, lower extremity edema, hypoxemia, pulmonary hypertension is possible. Repeat echocardiogram and planning in the next few days. Work-up being completed by Dr. Wood Washington. If echocardiogram abnormal, encourage right heart catheterization. Other Visit Diagnoses     Needs flu shot        Relevant Orders    INFLUENZA, QUADV, ADJUVANTED, 72 YRS =, IM, PF, PREFILL SYR, 0.5ML (FLUAD) (Completed)    Elevated diaphragm        Relevant Orders    FL SNIFF TEST                This note was transcribed using [id number] [employer]. Please disregard any translational errors.     Keron Palumbo [facility], Sleep and [facility] [phone]

## 2021-10-11 NOTE — LETTER
[facility]   [address] [facility]. [address]  Phone: [phone]  Fax: [phone]     10/12/2021    Dr. Adama Martin MD    Today had the pleasure to see our mutual patient, Christian Craig. My office note is attached. Please let me know if you have any questions.         Sincerely,    [facility] Palumbo [facility], [facility] and [facility] Care  [phone]

## 2021-10-12 PROBLEM — R09.02 HYPOXEMIA: Status: ACTIVE | Noted: 2021-10-12

## 2021-10-12 PROBLEM — G47.10 HYPERSOMNIA: Status: ACTIVE | Noted: 2021-10-12

## 2021-10-12 PROBLEM — Z78.9 POOR HISTORIAN: Status: ACTIVE | Noted: 2021-10-12

## 2021-10-12 PROBLEM — J98.6 ELEVATED HEMIDIAPHRAGM: Status: ACTIVE | Noted: 2021-10-12

## 2021-10-12 PROBLEM — R93.1 ECHOCARDIOGRAM ABNORMAL: Status: ACTIVE | Noted: 2021-10-12

## 2021-10-12 NOTE — ASSESSMENT & PLAN NOTE
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 Enlarged liver which may be leading to elevation.   Sniff test ordered

## 2021-10-12 NOTE — ASSESSMENT & PLAN NOTE
Patient is a poor historian very quick to answer no to all questions only to be corrected by . 1 specific example, answered no to ever having used weight loss drugs but appears to have used copious amounts of weight loss drugs particularly in the 80s. She did not know the names of any of these drugs. Unfortunately, medications like Fen/Phen was, and has known to be linked to pulmonary artery hypertension. Other examples is with her sleep history.

## 2021-10-12 NOTE — ASSESSMENT & PLAN NOTE
Discharge shortly documented as\" consistent with pulmonary hypertension\" but does not elaborate. Echocardiogram report not available. With her history of weight loss drugs in the 80s, lower extremity edema, hypoxemia, pulmonary hypertension is possible. Repeat echocardiogram and planning in the next few days. Work-up being completed by Dr. Teresita Guzman. If echocardiogram abnormal, encourage right heart catheterization.

## 2021-10-13 ENCOUNTER — HOSPITAL ENCOUNTER (OUTPATIENT)
Dept: GENERAL RADIOLOGY | Age: 76
Discharge: HOME | End: 2021-10-13
Payer: MEDICARE

## 2021-10-13 DIAGNOSIS — J98.6 ELEVATED DIAPHRAGM: ICD-10-CM

## 2021-10-13 PROCEDURE — 76000 FLUOROSCOPY <1 HR PHYS/QHP: CPT

## 2021-11-07 ENCOUNTER — HOSPITAL ENCOUNTER (OUTPATIENT)
Dept: SLEEP CENTER | Age: 76
Discharge: HOME | End: 2021-11-07
Payer: MEDICARE

## 2021-11-07 DIAGNOSIS — G47.10 HYPERSOMNIA: ICD-10-CM

## 2021-11-07 PROCEDURE — 95810 POLYSOM 6/> YRS 4/> PARAM: CPT

## 2021-11-08 PROCEDURE — 95810 POLYSOM 6/> YRS 4/> PARAM: CPT | Performed by: PSYCHIATRY & NEUROLOGY

## 2021-11-09 ENCOUNTER — HOSPITAL ENCOUNTER (OUTPATIENT)
Dept: CARDIAC REHAB | Age: 76
Setting detail: THERAPIES SERIES
Discharge: HOME | End: 2021-11-09
Payer: MEDICARE

## 2021-11-09 DIAGNOSIS — R09.02 HYPOXEMIA: ICD-10-CM

## 2021-11-09 PROCEDURE — 94618 PULMONARY STRESS TESTING: CPT

## 2021-11-09 NOTE — PROGRESS NOTES
Celina Correa    [facility] Cardiop[facility] Rehabilitation    Qualifying Data for Home Oxygen        Resting Oxygen Saturation on Room Air:  92%    Exercise Oxygen Saturation on Room Air:  88%      Resting Oxygen Saturation on Oxygen:  96% 2L      Exercise Oxygen Saturation on Oxygen: 93% 2L

## 2021-11-10 PROCEDURE — 94618 PULMONARY STRESS TESTING: CPT | Performed by: INTERNAL MEDICINE

## 2021-11-12 ENCOUNTER — TELEPHONE (OUTPATIENT)
Dept: SLEEP MEDICINE | Age: 76
End: 2021-11-12

## 2021-11-12 DIAGNOSIS — G47.33 OSA (OBSTRUCTIVE SLEEP APNEA): ICD-10-CM

## 2021-11-12 DIAGNOSIS — G47.10 HYPERSOMNIA: Primary | ICD-10-CM

## 2021-11-12 NOTE — TELEPHONE ENCOUNTER
Carol Reza calls back and only wants to talk with Paresh Osborne. Please call Nyla back at [phone].

## 2021-11-12 NOTE — TELEPHONE ENCOUNTER
LVM for patient to  for sleep study results. .Sleep study showed moderate JACK. AHI was 18.0  per hr. And O2 Desaturations to 79%. These events were increased in both supine and REM sleep. Hypoxemia with low starting O2 saturation of 91% and low mean O2 saturation of 90% was noted. Patient spent 4.9 min below 85% and 114.0 below 90% on room air. Dr Nicole Cummins titration.

## 2021-11-17 ENCOUNTER — TELEPHONE (OUTPATIENT)
Dept: PULMONOLOGY | Age: 76
End: 2021-11-17

## 2021-11-17 ENCOUNTER — HOSPITAL ENCOUNTER (OUTPATIENT)
Dept: SLEEP CENTER | Age: 76
Discharge: HOME | End: 2021-11-17
Payer: MEDICARE

## 2021-11-17 DIAGNOSIS — G47.33 OSA (OBSTRUCTIVE SLEEP APNEA): ICD-10-CM

## 2021-11-17 PROCEDURE — 95811 POLYSOM 6/>YRS CPAP 4/> PARM: CPT

## 2021-11-17 NOTE — TELEPHONE ENCOUNTER
Julián calls. Nyla woke up at 5:00am this morning to go to the bathroom and had \"breathing problems\". \"Can't take a deep breathe\". Opal Farmer is only on a poc that is pulse dosed she purchased while in [place] from [facility]. Pulse ox is showing o2 at 93 while talking to pt resting. No other oxygen equipment in the home. Dr. Cortez Brought did you want orders for home oxygen set up? If so, please advise on Oxygen orders. Pt will go with [facility]. 6MWT completed on 11/9/21    Also patient is not scheduled to have sleep titration study until 12/21 unless you think its necessary to be worked in sooner. Need order to show reason for earlier appointment.

## 2021-11-17 NOTE — TELEPHONE ENCOUNTER
Discussed with MA on Friday about submitting order. Not sure why this has not been completed    Can we move up sleep study?

## 2021-11-18 PROCEDURE — 95811 POLYSOM 6/>YRS CPAP 4/> PARM: CPT | Performed by: PSYCHIATRY & NEUROLOGY

## 2021-11-18 NOTE — TELEPHONE ENCOUNTER
[facility] Per Dr Edinson Lama w/exertion and at night. LVM w/Nyla regarding her oxygen needs and that we were faxing order to [address] [place] [address]. Told her if she has any questions to contact our office.

## 2021-11-18 NOTE — TELEPHONE ENCOUNTER
I was not here last week and no one in the office seems to know what was discussed regarding Nyla's oxygen. It appears she had titration sleep study last night as well. Please advise?

## 2021-11-18 NOTE — TELEPHONE ENCOUNTER
She was supposed to be connected to DME multiple times. How does this keep falling through the cracks.   Get her connected with home o2 today

## 2021-12-21 ENCOUNTER — TELEPHONE (OUTPATIENT)
Dept: PULMONOLOGY | Age: 76
End: 2021-12-21

## 2021-12-21 NOTE — TELEPHONE ENCOUNTER
Patient's  calls wanting to know if Jen Fajardo needs to be on a Bipap machine with O2 .   Her study showed the need; please advise

## 2021-12-22 NOTE — TELEPHONE ENCOUNTER
Spoke with  Fili Rhodes and Jeanne Crawfordly on speaker phone regarding PSG w/ BPAP Report and need for Bpap 14/10. They agreed to send the Rx for this to [facility]. Dr Laura Vincent I just want to clarify the bpap will replace the nocturnal O2 she is using and there will not be a bleed in at this time?

## 2022-01-19 ENCOUNTER — OFFICE VISIT (OUTPATIENT)
Dept: PULMONOLOGY | Age: 77
End: 2022-01-19
Payer: MEDICARE

## 2022-01-19 VITALS
HEIGHT: 64 IN | HEART RATE: 72 BPM | SYSTOLIC BLOOD PRESSURE: 118 MMHG | RESPIRATION RATE: 16 BRPM | BODY MASS INDEX: 27.81 KG/M2 | OXYGEN SATURATION: 93 % | TEMPERATURE: 93 F | DIASTOLIC BLOOD PRESSURE: 68 MMHG

## 2022-01-19 DIAGNOSIS — R09.02 HYPOXEMIA: ICD-10-CM

## 2022-01-19 DIAGNOSIS — J98.6 PARALYSIS OF DIAPHRAGM: ICD-10-CM

## 2022-01-19 DIAGNOSIS — R93.1 ECHOCARDIOGRAM ABNORMAL: ICD-10-CM

## 2022-01-19 DIAGNOSIS — G47.33 OSA (OBSTRUCTIVE SLEEP APNEA): ICD-10-CM

## 2022-01-19 PROCEDURE — 99214 OFFICE O/P EST MOD 30 MIN: CPT | Performed by: INTERNAL MEDICINE

## 2022-01-19 NOTE — PROGRESS NOTES
REASON FOR CONSULTATION/CC:    Chief Complaint   Patient presents with    Follow-up        Consult at request of   Khushboo Slaughter MD for  hypoxemia    PCP: Khushboo Slaughter MD    HISTORY OF PRESENT ILLNESS: Adams Neumann is a 67y.o. year old female with a         She was on vacation in [place] and had issues with dyspnea. She was told she had pulmonary edema and treaed with lasix but this did not help. [facility] then told her dyspnea was related to [facility] and is now using oxygen tent       PH? Seeing cardiology weight loss drug taken in the 80s but does not remember medication she took. Denies history of smoking or cocaine use. She  Was discharged on oxygen 2 L NC and is currently using it as needed and at night. Hx . Snores, tired and some naps. Patient denies any sleep issues but then also states she is sleeping better with oxygen. Current history:   Using oxygen at night, 2 L NC. Rare during the day with shortness of breath     Saturation typically 94%. Cardiology  Following with Dr. Ervin Baptiste          Objective:   PHYSICAL EXAM:  Blood pressure 118/68, pulse 72, temperature 93 °F (33.9 °C), temperature source Infrared, resp. rate 16, height 5' 4\" (1.626 m), SpO2 93 %, not currently breastfeeding.'    Gen: No distress. Eyes: PERRL. No sclera icterus. No conjunctival injection. ENT: No discharge. Pharynx clear. External appearance of ears and nose normal.  Neck: Trachea midline. No obvious mass. Resp: No accessory muscle use. No crackles. No wheezes. No rhonchi. CV: Regular rate. Regular rhythm. No murmur or rub. No edema. GI:    Skin: Warm, dry, normal texture and turgor. No nodule on exposed extremities. Lymph: No cervical LAD. No supraclavicular LAD. M/S: No cyanosis. No clubbing. No joint deformity. Neuro: Moves all four extremities. Psych: Oriented x 3. No anxiety. Awake. Alert. Intact judgement and insight.        Data Reviewed:        Assessment: Hypoxia  History of weight loss drugs in the 80s  History of gastric banding  Abnormal echocardiogram  Hypersomnia with snoring  Poor historian  JACK, moderate AHI 18, nocturnal hypoxemia 79% on room air, controlled 14/10 off oxygen. Plan:      Problem List Items Addressed This Visit     Paralysis of diaphragm     Discussed benefits of weight loss and exercise. Advised against diaphragmatic pacer. No further work but not clear etiology. JACK (obstructive sleep apnea)     Advised treatment with cpap with paralyzed diaphragm. She will consider it but is unlikely to pursue oxygen. Hypoxemia     secondary to paralyzed diaphragm with JACK. Using 2 L NC at night and prn. Echocardiogram abnormal     Improved with follow up by Dr. Sondra Mera. Elevated PASP likely secondary to hypoxemia. This note was transcribed using [id number] [employer]. Please disregard any translational errors.     Keron Palumbo Pulmonary, Sleep and [facility] [phone]

## 2022-01-21 NOTE — ASSESSMENT & PLAN NOTE
Discussed benefits of weight loss and exercise. Advised against diaphragmatic pacer. No further work but not clear etiology.

## 2022-01-21 NOTE — ASSESSMENT & PLAN NOTE
Advised treatment with cpap with paralyzed diaphragm. She will consider it but is unlikely to pursue oxygen.

## 2022-01-25 ENCOUNTER — APPOINTMENT (OUTPATIENT)
Dept: CT IMAGING | Age: 77
End: 2022-01-25
Payer: MEDICARE

## 2022-01-25 ENCOUNTER — HOSPITAL ENCOUNTER (EMERGENCY)
Age: 77
Discharge: HOME | End: 2022-01-25
Attending: EMERGENCY MEDICINE
Payer: MEDICARE

## 2022-01-25 VITALS
OXYGEN SATURATION: 98 % | HEART RATE: 60 BPM | TEMPERATURE: 97.9 F | RESPIRATION RATE: 16 BRPM | SYSTOLIC BLOOD PRESSURE: 132 MMHG | DIASTOLIC BLOOD PRESSURE: 61 MMHG
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
## 2022-01-25 DIAGNOSIS — R07.89 RIGHT-SIDED CHEST WALL PAIN: ICD-10-CM

## 2022-01-25 LAB
A/G RATIO: 1.6 (ref 1.1–2.2)
ALBUMIN SERPL-MCNC: 4.2 G/DL (ref 3.4–5)
ALP BLD-CCNC: 86 U/L (ref 40–129)
ALT SERPL-CCNC: 29 U/L (ref 10–40)
ANION GAP SERPL CALCULATED.3IONS-SCNC: 13 MMOL/L (ref 3–16)
AST SERPL-CCNC: 23 U/L (ref 15–37)
BASOPHILS ABSOLUTE: 0.1 K/UL (ref 0–0.2)
BASOPHILS RELATIVE PERCENT: 0.9 %
BILIRUB SERPL-MCNC: 0.3 MG/DL (ref 0–1)
BUN BLDV-MCNC: 23 MG/DL (ref 7–20)
CALCIUM SERPL-MCNC: 10.4 MG/DL (ref 8.3–10.6)
CHLORIDE BLD-SCNC: 103 MMOL/L (ref 99–110)
CO2: 26 MMOL/L (ref 21–32)
CREAT SERPL-MCNC: 0.9 MG/DL (ref 0.6–1.2)
EOSINOPHILS ABSOLUTE: 0.2 K/UL (ref 0–0.6)
EOSINOPHILS RELATIVE PERCENT: 2.1 %
GFR AFRICAN AMERICAN: >60
GFR NON-AFRICAN AMERICAN: >60
GLUCOSE BLD-MCNC: 116 MG/DL (ref 70–99)
HCT VFR BLD CALC: 39.9 % (ref 36–48)
HEMOGLOBIN: 13.6 G/DL (ref 12–16)
LIPASE: 27 U/L (ref 13–60)
LYMPHOCYTES ABSOLUTE: 2.1 K/UL (ref 1–5.1)
LYMPHOCYTES RELATIVE PERCENT: 25.5 %
MAGNESIUM: 2 MG/DL (ref 1.8–2.4)
MCH RBC QN AUTO: 33.1 PG (ref 26–34)
MCHC RBC AUTO-ENTMCNC: 34 G/DL (ref 31–36)
MCV RBC AUTO: 97.2 FL (ref 80–100)
MONOCYTES ABSOLUTE: 0.7 K/UL (ref 0–1.3)
MONOCYTES RELATIVE PERCENT: 8.7 %
NEUTROPHILS ABSOLUTE: 5.2 K/UL (ref 1.7–7.7)
NEUTROPHILS RELATIVE PERCENT: 62.8 %
PDW BLD-RTO: 11.8 % (ref 12.4–15.4)
PLATELET # BLD: 213 K/UL (ref 135–450)
PMV BLD AUTO: 8.9 FL (ref 5–10.5)
POTASSIUM REFLEX MAGNESIUM: 3.4 MMOL/L (ref 3.5–5.1)
RBC # BLD: 4.11 M/UL (ref 4–5.2)
SODIUM BLD-SCNC: 142 MMOL/L (ref 136–145)
TOTAL PROTEIN: 6.8 G/DL (ref 6.4–8.2)
WBC # BLD: 8.2 K/UL (ref 4–11)

## 2022-01-25 PROCEDURE — 74177 CT ABD & PELVIS W/CONTRAST: CPT

## 2022-01-25 PROCEDURE — 6360000004 HC RX CONTRAST MEDICATION: Performed by: EMERGENCY MEDICINE

## 2022-01-25 PROCEDURE — 99283 EMERGENCY DEPT VISIT LOW MDM: CPT

## 2022-01-25 PROCEDURE — 96375 TX/PRO/DX INJ NEW DRUG ADDON: CPT

## 2022-01-25 PROCEDURE — 85025 COMPLETE CBC W/AUTO DIFF WBC: CPT

## 2022-01-25 PROCEDURE — 83735 ASSAY OF MAGNESIUM: CPT

## 2022-01-25 PROCEDURE — 83690 ASSAY OF LIPASE: CPT

## 2022-01-25 PROCEDURE — 96374 THER/PROPH/DIAG INJ IV PUSH: CPT

## 2022-01-25 PROCEDURE — 80053 COMPREHEN METABOLIC PANEL: CPT

## 2022-01-25 PROCEDURE — 6360000002 HC RX W HCPCS: Performed by: EMERGENCY MEDICINE

## 2022-01-25 PROCEDURE — 6370000000 HC RX 637 (ALT 250 FOR IP): Performed by: EMERGENCY MEDICINE

## 2022-01-25 RX ORDER — FENTANYL CITRATE 50 UG/ML
25 INJECTION, SOLUTION INTRAMUSCULAR; INTRAVENOUS ONCE
Status: COMPLETED | OUTPATIENT
Start: 2022-01-25 | End: 2022-01-25

## 2022-01-25 RX ORDER — LIDOCAINE 4 G/G
1 PATCH TOPICAL DAILY
Qty: 30 PATCH | Refills: 0 | Status: SHIPPED | OUTPATIENT
Start: 2022-01-25 | End: 2022-02-24

## 2022-01-25 RX ORDER — KETOROLAC TROMETHAMINE 30 MG/ML
15 INJECTION, SOLUTION INTRAMUSCULAR; INTRAVENOUS ONCE
Status: COMPLETED | OUTPATIENT
Start: 2022-01-25 | End: 2022-01-25

## 2022-01-25 RX ORDER — ONDANSETRON 2 MG/ML
4 INJECTION INTRAMUSCULAR; INTRAVENOUS ONCE
Status: COMPLETED | OUTPATIENT
Start: 2022-01-25 | End: 2022-01-25

## 2022-01-25 RX ORDER — ACETAMINOPHEN 500 MG
500 TABLET ORAL 4 TIMES DAILY PRN
Qty: 120 TABLET | Refills: 0 | Status: SHIPPED | OUTPATIENT
Start: 2022-01-25 | End: 2022-07-29

## 2022-01-25 RX ORDER — LIDOCAINE 4 G/G
1 PATCH TOPICAL ONCE
Status: DISCONTINUED | OUTPATIENT
Start: 2022-01-25 | End: 2022-01-25 | Stop reason: HOSPADM

## 2022-01-25 RX ADMIN — IOPAMIDOL 75 ML: 755 INJECTION, SOLUTION INTRAVENOUS at 05:13

## 2022-01-25 RX ADMIN — KETOROLAC TROMETHAMINE 15 MG: 30 INJECTION, SOLUTION INTRAMUSCULAR at 06:28

## 2022-01-25 RX ADMIN — FENTANYL CITRATE 25 MCG: 50 INJECTION, SOLUTION INTRAMUSCULAR; INTRAVENOUS at 04:24

## 2022-01-25 RX ADMIN — ONDANSETRON 4 MG: 2 INJECTION INTRAMUSCULAR; INTRAVENOUS at 04:23

## 2022-01-25 ASSESSMENT — ENCOUNTER SYMPTOMS
SHORTNESS OF BREATH: 0
COLOR CHANGE: 0
NAUSEA: 0
PHOTOPHOBIA: 0
VOMITING: 0
TROUBLE SWALLOWING: 0
COUGH: 0
ABDOMINAL PAIN: 0

## 2022-01-25 ASSESSMENT — PAIN SCALES - GENERAL
PAINLEVEL_OUTOF10: 9
PAINLEVEL_OUTOF10: 9
PAINLEVEL_OUTOF10: 4

## 2022-01-25 ASSESSMENT — PAIN DESCRIPTION - ONSET: ONSET: ON-GOING

## 2022-01-25 ASSESSMENT — PAIN - FUNCTIONAL ASSESSMENT: PAIN_FUNCTIONAL_ASSESSMENT: PREVENTS OR INTERFERES WITH MANY ACTIVE NOT PASSIVE ACTIVITIES

## 2022-01-25 ASSESSMENT — PAIN DESCRIPTION - LOCATION: LOCATION: RIB CAGE

## 2022-01-25 ASSESSMENT — PAIN DESCRIPTION - PAIN TYPE
TYPE: ACUTE PAIN
TYPE: ACUTE PAIN

## 2022-01-25 ASSESSMENT — PAIN DESCRIPTION - FREQUENCY: FREQUENCY: CONTINUOUS

## 2022-01-25 ASSESSMENT — PAIN DESCRIPTION - PROGRESSION: CLINICAL_PROGRESSION: GRADUALLY WORSENING

## 2022-01-25 ASSESSMENT — PAIN DESCRIPTION - DESCRIPTORS: DESCRIPTORS: ACHING;SHARP

## 2022-01-25 ASSESSMENT — PAIN DESCRIPTION - ORIENTATION: ORIENTATION: RIGHT

## 2022-01-25 NOTE — ED PROVIDER NOTES
[facility]  EMERGENCY DEPARTMENTENCOUNTER      Pt Name: Natasha Mathews  MRN: [id number]  [place] 1945  Date ofevaluation: 1/25/2022  Provider: Alana Walker MD    [address] [place] [address]       Chief Complaint   Patient presents with    Rib Pain (injury)     R side    Fall     pt states she tripped and fell yesterday and hit the R side of her body; c/o R sided rib pain that has worsened today; states she has aparalyzed diaprahgm          HISTORY OF PRESENT ILLNESS   (Location/Symptom, Timing/Onset,Context/Setting, Quality, Duration, Modifying Factors, Severity)  Note limiting factors. Natasha Mathews is a 67 y.o. female  who  has a past medical history of Anxiety, Balance disorder, GERD (gastroesophageal reflux disease), History of kidney stones, Hx of blood clots, Hyperlipidemia, Hypertension, Infection, Liver cyst, Olfactory nerve injury, Right ear injury, and Thyroid disease. who presents to the emergency department for evaluation of right-sided flank and chest wall pain. Patient reports having a fall at home on Saturday. States initially she was able to get up and was feeling well but still began developing worsening pain to the right flank. She states that the pain got to the point where she is able to get a bed number she presented to the ED via EMS. She denies difficulties breathing abdominal pain nausea vomiting or changes in bowel or urine function. 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 She reports he is taken home medications without improvement of her symptoms. HPI    NursingNotes were reviewed. REVIEW OF SYSTEMS    (2-9 systems for level 4, 10 or more for level 5)     Review of Systems   Constitutional: Negative for activity change, fatigue and fever. HENT: Negative for congestion, mouth sores and trouble swallowing. Eyes: Negative for photophobia and visual disturbance.    Respiratory: Negative for cough and shortness of breath. Cardiovascular: Positive for chest pain. Negative for palpitations. Gastrointestinal: Negative for abdominal pain, nausea and vomiting. Genitourinary: Positive for flank pain. Negative for difficulty urinating and frequency. Musculoskeletal: Negative for gait problem and neck pain. Skin: Negative for color change and rash. Neurological: Negative for dizziness, weakness, light-headedness, numbness and headaches. Psychiatric/Behavioral: Negative for confusion. The patient is not nervous/anxious. All other systems reviewed and are negative. Except as noted above the remainder of the review of systems was reviewed and negative.        PAST MEDICAL HISTORY     Past Medical History:   Diagnosis Date    Anxiety     Balance disorder     unknown etiology    GERD (gastroesophageal reflux disease)     History of kidney stones     Hx of blood clots     x 1 from use of BCPs    Hyperlipidemia     Hypertension     Infection 11/2015    gastric band    Liver cyst     \"possible\"    Olfactory nerve injury     mostly resolved    Right ear injury 20 yrs ago    Thyroid disease          SURGICALHISTORY       Past Surgical History:   Procedure Laterality Date    ABDOMINOPLASTY  2012    also cosmetic surgery on arms    BACK SURGERY  1998    exc. cyst lumbar    CHOLECYSTECTOMY      COLONOSCOPY      COLONOSCOPY N/A 8/19/2019    COLONOSCOPY DIAGNOSTIC performed by Alejandro Essex, MD at [address] [place] [address]      breast reduction    ENDOSCOPY, COLON, DIAGNOSTIC      EYE SURGERY      cataracts   [address]    also breast reduction    FACIAL COSMETIC SURGERY  [id number]     FACELIFT, BROWLIFT, UPPER AND LOWER BLEPHEROPLASTY AND    GASTRIC BAND  2011    also port removal subsequently (3/2015)   Lance Guallpa 24  11/24/2015    LAPAROSCOPIC GASTRIC BAND REMOVAL         OVARIAN CYST SURGERY  1987    UTERINE FIBROID SURGERY  1974         CURRENT MEDICATIONS       Previous Medications    ASPIRIN 81 MG CHEWABLE TABLET    Take 81 mg by mouth daily    DOCUSATE SODIUM (COLACE) 100 MG CAPSULE    Take 100 mg by mouth nightly. ESTRADIOL (ESTRACE) 0.1 MG/GM VAGINAL CREAM    Place 2 g vaginally daily    IBUPROFEN (ADVIL;MOTRIN) 200 MG TABLET    Take 200 mg by mouth every 6 hours as needed for Pain    LEVOTHYROXINE (SYNTHROID) 50 MCG TABLET        LOVASTATIN (MEVACOR) 40 MG TABLET        MULTIPLE VITAMINS-MINERALS (THERAPEUTIC MULTIVITAMIN-MINERALS) TABLET    Take 1 tablet by mouth daily    SERTRALINE (ZOLOFT) 100 MG TABLET    Take 100 mg by mouth nightly. VALSARTAN-HYDROCHLOROTHIAZIDE (DIOVAN-HCT) 160-25 MG PER TABLET    Take 1 tablet by mouth daily            Demerol, Erythromycin, and Pcn [penicillins]    FAMILY HISTORY     History reviewed. No pertinent family history. SOCIAL HISTORY       Social History     Socioeconomic History    Marital status:      Spouse name: None    Number of children: None    Years of education: None    Highest education level: None   Occupational History    None   Tobacco Use    Smoking status: Never Smoker    Smokeless tobacco: Never Used   Vaping Use    Vaping Use: None   Substance and Sexual Activity    Alcohol use: No    Drug use: No    Sexual activity: None   Other Topics Concern    None   Social History Narrative    None     Social Determinants of Health     Financial Resource Strain:     Difficulty of Paying Living Expenses: Not on file   Food Insecurity:     Worried About Running Out of Food in the Last Year: Not on file    Kayla of Food in the Last Year: Not on file   Transportation Needs:     Lack of Transportation (Medical): Not on file    Lack of Transportation (Non-Medical):  Not on file   Physical Activity:     Days of Exercise per Week: Not on file    Minutes of Exercise per Session: Not on file   Stress:     Feeling of Stress : Not on file   Social Connections:     Frequency of Communication with Friends and Family: Not on file    Frequency of Social Gatherings with Friends and Family: Not on file    Attends Holiness Services: Not on file    Active Member of Clubs or Organizations: Not on file    Attends Club or Organization Meetings: Not on file    Marital Status: Not on file   Intimate Partner Violence:     Fear of Current or Ex-Partner: Not on file    Emotionally Abused: Not on file    Physically Abused: Not on file    Sexually Abused: Not on file   Housing Stability:     Unable to Pay for Housing in the Last Year: Not on file    Number of [place] in the Last Year: Not on file    Unstable Housing in the Last Year: Not on file       SCREENINGS             PHYSICAL EXAM    (up to 7 for level 4, 8 or more for level 5)     ED Triage Vitals   BP Temp Temp Source Pulse Resp SpO2 Height Weight   01/25/22 0330 01/25/22 0630 01/25/22 0630 01/25/22 0330 01/25/22 0330 01/25/22 0330 -- --   (!) 148/68 97.9 °F (36.6 °C) Tympanic 64 16 93 %         Physical Exam  Vitals and nursing note reviewed. Constitutional:       Appearance: She is well-developed. HENT:      Head: Normocephalic and atraumatic. Mouth/Throat:      Mouth: Mucous membranes are moist.      Pharynx: Oropharynx is clear. Eyes:      Extraocular Movements: Extraocular movements intact. Conjunctiva/sclera: Conjunctivae normal.      Pupils: Pupils are equal, round, and reactive to light. Neck:      Trachea: No tracheal deviation. Cardiovascular:      Rate and Rhythm: Normal rate and regular rhythm. Heart sounds: Normal heart sounds. Pulmonary:      Effort: Pulmonary effort is normal.      Breath sounds: Normal breath sounds. Chest:      Chest wall: Tenderness present. Abdominal:      General: There is no distension. Palpations: Abdomen is soft. Tenderness: There is no abdominal tenderness.  There is no right CVA tenderness, left CVA tenderness or guarding. Musculoskeletal:         General: No swelling. Normal range of motion. Cervical back: Normal range of motion. Skin:     General: Skin is warm and dry. Capillary Refill: Capillary refill takes less than 2 seconds. Coloration: Skin is not pale. Findings: No bruising or erythema. Neurological:      General: No focal deficit present. Mental Status: She is alert and oriented to person, place, and time. RESULTS     EKG: All EKG's are interpreted by the Emergency Department Physician who either signs or Co-signsthis chart in the absence of a cardiologist.      RADIOLOGY:   Antonina Caldwell such as CT, Ultrasound and MRI are read by the radiologist. Charles Farris radiographic images are visualized and preliminarily interpreted by the emergency physician with the below findings:      Interpretation per the Radiologist below, if available at the time ofthis note:    CT ABDOMEN PELVIS W IV CONTRAST Additional Contrast? None   Final Result   Lower pole punctate nonobstructive calculi bilaterally. Stable appearing simple and mildly complex hepatic/renal cysts. Hepatic steatosis. No ileus or obstruction. Bibasilar atelectasis. Atherosclerotic disease including coronary artery involvement.                ED BEDSIDE ULTRASOUND:   Performed by ED Physician - none    LABS:  Labs Reviewed   CBC WITH AUTO DIFFERENTIAL - Abnormal; Notable for the following components:       Result Value    RDW 11.8 (*)     All other components within normal limits    Narrative:     Performed at:  [address] [address]   Phone [phone]   COMPREHENSIVE METABOLIC PANEL W/ REFLEX TO MG FOR LOW K - Abnormal; Notable for the following components:    Potassium reflex Magnesium 3.4 (*)     Glucose 116 (*)     BUN 23 (*)     All other components within normal limits    Narrative:     Performed at:  [facility]  [address] [place], [place] [address]   Phone [phone]   LIPASE    Narrative:     Performed at:  [facility]  [address] [place], [place] [address]   Phone [phone]   MAGNESIUM    Narrative:     Performed at:  [facility]  [address] [facility] [place], [place] [address]   Phone [phone]       All other labs were within normal range or not returned as of this dictation. EMERGENCY DEPARTMENT COURSE and DIFFERENTIAL DIAGNOSIS/MDM:   Vitals:    Vitals:    01/25/22 0330 01/25/22 0345 01/25/22 0400 01/25/22 0630   BP: (!) 148/68 (!) 146/66 139/63 132/61   Pulse: 64 66 68 60   Resp: 16  21 16   Temp:    97.9 °F (36.6 °C)   TempSrc:    Tympanic   SpO2: 93% 92% 93% 98%       Patient was given thefollowing medications:  Medications   lidocaine 4 % external patch 1 patch (1 patch TransDERmal Patch Applied 1/25/22 0426)   ondansetron (ZOFRAN) injection 4 mg (4 mg IntraVENous Given 1/25/22 0423)   fentaNYL (SUBLIMAZE) injection 25 mcg (25 mcg IntraVENous Given 1/25/22 0424)   iopamidol (ISOVUE-370) 76 % injection 75 mL (75 mLs IntraVENous Given 1/25/22 5102)   ketorolac (TORADOL) injection 15 mg (15 mg IntraVENous Given 1/25/22 2406)       ED COURSE & MEDICAL DECISION MAKING    Pertinent Labs & Imaging studies reviewed. (See chart for details)   -  Patient seen and evaluated in the emergency department. -  Triage and nursing notes reviewed and incorporated. -  Old chart records reviewed and incorporated. -  Differential diagnosis includes: Differential Diagnosis: Rib fractures, Pulmonary Contusion, Cardiac contusion, Pneumothorax, Pneumomediastinum, Hemothorax, Splenic laceration, Liver laceration, Renal contusion, Thoracic aortic injury, other.    -  Work-up included:  See above  -  ED treatment included: See above  -  Results discussed with patient.   Patient resents ED for evaluation of flank pain and right-sided chest wall pain after a fall. On presentation vital signs are within normal limits. Patient has no obvious step-offs deformities crepitus or bruising. No flank or periumbilical ecchymosis. Labs show no emergent laboratory normalities. Imaging studies show no acute intrathoracic or intra-abdominal abnormalities on CT. Patient feels improved on reevaluation. Symptomatic treatment with expectant management discussed with the patient and they and/or family members present are amenable to treatment plan and outpatient follow-up. Strict return precautions were discussed with the patient and those present. They demonstrated understanding of when to return to the emergency department for new or worsening symptoms. .  The patient is agreeable with plan of care and disposition. REASSESSMENT          CRITICAL CARE TIME   Total Critical Care time was 10 minutes, excluding separately reportable procedures. There was a high probability of clinically significant/life threatening deterioration in the patient's condition which required my urgent intervention. CONSULTS:  None    PROCEDURES:  Unless otherwise noted below, none     Procedures    FINAL IMPRESSION      1. Fall, initial encounter    2. Right-sided chest wall pain          DISPOSITION/PLAN   DISPOSITION Decision To Discharge 01/25/2022 05:48:51 AM      PATIENT REFERREDTO:  No follow-up provider specified.     DISCHARGEMEDICATIONS:  New Prescriptions    ACETAMINOPHEN (TYLENOL) 500 MG TABLET    Take 1 tablet by mouth 4 times daily as needed for Pain    LIDOCAINE 4 % EXTERNAL PATCH    Place 1 patch onto the skin daily          (Please note that portions of this note were completed with a voice recognition program.  Efforts were made to edit the dictations but occasionally words are mis-transcribed.)    No Hernandez MD (electronically signed)  Attending Emergency Physician         No Hernandez MD  01/25/22 5240

## 2022-01-25 NOTE — ED NOTES
Pt confirmed d/c paperwork have correct name. Discharge and education instructions reviewed with patient. Teach-back successful. Pt verbalized understanding and signed d/c papers. Pt denied questions at this time. No acute distress noted. Patient instructed to follow-up as noted - return to emergency department if symptoms worsen. Patient verbalized understanding. Discharged per EDMD with discharge instructions. Pt discharged to private vehicle. Patient stable upon departure. Thanked patient for choosing [facility]) for care.               [id number] Pearce RN  01/25/22 4071

## 2022-07-21 ENCOUNTER — OFFICE VISIT (OUTPATIENT)
Dept: PULMONOLOGY | Age: 77
End: 2022-07-21
Payer: MEDICARE

## 2022-07-21 VITALS
RESPIRATION RATE: 21 BRPM | BODY MASS INDEX: 25.03 KG/M2 | HEIGHT: 64 IN | OXYGEN SATURATION: 95 % | HEART RATE: 80 BPM | TEMPERATURE: 97.3 F | DIASTOLIC BLOOD PRESSURE: 80 MMHG | WEIGHT: 146.6 LBS | SYSTOLIC BLOOD PRESSURE: 115 MMHG

## 2022-07-21 DIAGNOSIS — R09.02 HYPOXEMIA: ICD-10-CM

## 2022-07-21 DIAGNOSIS — G47.33 OSA (OBSTRUCTIVE SLEEP APNEA): ICD-10-CM

## 2022-07-21 DIAGNOSIS — J98.6 PARALYSIS OF DIAPHRAGM: ICD-10-CM

## 2022-07-21 PROCEDURE — 1123F ACP DISCUSS/DSCN MKR DOCD: CPT | Performed by: INTERNAL MEDICINE

## 2022-07-21 PROCEDURE — 99213 OFFICE O/P EST LOW 20 MIN: CPT | Performed by: INTERNAL MEDICINE

## 2022-07-21 NOTE — PROGRESS NOTES
REASON FOR CONSULTATION/CC:    Chief Complaint   Patient presents with    Follow-up     6 months. Coughing         Consult at request of   Jo Ann Khan MD for  hypoxemia    PCP: Jo Ann Khan MD    HISTORY OF PRESENT ILLNESS: Keesha Spann is a 67y.o. year old female with a         She was on vacation in [place] and had issues with dyspnea. She was told she had pulmonary edema and treaed with lasix but this did not help. The [facility] then told her dyspnea was related to altitude and is now using oxygen tent       PH? Seeing cardiology weight loss drug taken in the 80s but does not remember medication she took. Denies history of smoking or cocaine use. She  Was discharged on oxygen 2 L NC and is currently using it as needed and at night. Hx . Snores, tired and some naps. Patient denies any sleep issues but then also states she is sleeping better with oxygen. Current history:          Occasional cough with belch with air    Hypoxemia   Using oxygne at night and prn during he day. Paralyzed diaphrgam.   Some shortness of breath but minimal             Objective:   PHYSICAL EXAM:  Blood pressure 115/80, pulse 80, temperature 97.3 °F (36.3 °C), resp. rate 21, height 5' 4\" (1.626 m), weight 146 lb 9.6 oz (66.5 kg), SpO2 95 %, not currently breastfeeding.'    Gen: No distress. Eyes: PERRL. No sclera icterus. No conjunctival injection. ENT: No discharge. Pharynx clear. External appearance of ears and nose normal.  Neck: Trachea midline. No obvious mass. Resp: No accessory muscle use. No crackles. No wheezes. No rhonchi. CV: Regular rate. Regular rhythm. No murmur or rub. No edema. GI:    Skin: Warm, dry, normal texture and turgor. No nodule on exposed extremities. Lymph: No cervical LAD. No supraclavicular LAD. M/S: No cyanosis. No clubbing. No joint deformity. Neuro: Moves all four extremities. Psych: Oriented x 3. No anxiety. Awake. Alert.  Intact judgement and insight. Data Reviewed:        Assessment:     Hypoxia  History of weight loss drugs in the 80s  History of gastric banding  Abnormal echocardiogram  Hypersomnia with snoring  Poor historian  JACK, moderate AHI 18, nocturnal hypoxemia 79% on room air, controlled 14/10 off oxygen. Plan:      Problem List Items Addressed This Visit       Paralysis of diaphragm     Discussed on visit. No further work-up at this time         JACK (obstructive sleep apnea)     Using oxygen at night. Hypoxemia      2 L nasal cannula as needed and at night. Given hypoxemia with elevation changes, recommended to use 2 L nasal cannula with flying. This note was transcribed using [id number]. Please disregard any translational errors.     Keron Palumbo Pulmonary, Sleep and [facility] [phone]

## 2022-07-21 NOTE — ASSESSMENT & PLAN NOTE
2 L nasal cannula as needed and at night. Given hypoxemia with elevation changes, recommended to use 2 L nasal cannula with flying.

## 2022-07-26 ENCOUNTER — HOSPITAL ENCOUNTER (OUTPATIENT)
Dept: WOMENS IMAGING | Age: 77
Discharge: HOME | End: 2022-07-26
Payer: MEDICARE

## 2022-07-26 DIAGNOSIS — Z12.31 BREAST CANCER SCREENING BY MAMMOGRAM: ICD-10-CM

## 2022-07-26 PROCEDURE — 77063 BREAST TOMOSYNTHESIS BI: CPT

## 2022-07-29 ENCOUNTER — HOSPITAL ENCOUNTER (EMERGENCY)
Age: 77
Discharge: HOME | End: 2022-07-29
Attending: EMERGENCY MEDICINE
Payer: MEDICARE

## 2022-07-29 ENCOUNTER — APPOINTMENT (OUTPATIENT)
Dept: CT IMAGING | Age: 77
End: 2022-07-29
Payer: MEDICARE

## 2022-07-29 ENCOUNTER — HOSPITAL ENCOUNTER (OUTPATIENT)
Dept: WOMENS IMAGING | Age: 77
Discharge: HOME | End: 2022-07-29
Payer: MEDICARE

## 2022-07-29 VITALS
WEIGHT: 157.85 LBS | BODY MASS INDEX: 27.09 KG/M2 | HEART RATE: 91 BPM | OXYGEN SATURATION: 94 % | DIASTOLIC BLOOD PRESSURE: 82 MMHG | SYSTOLIC BLOOD PRESSURE: 147 MMHG | RESPIRATION RATE: 19 BRPM | TEMPERATURE: 97.6 F

## 2022-07-29 DIAGNOSIS — D64.9 ANEMIA, UNSPECIFIED TYPE: ICD-10-CM

## 2022-07-29 DIAGNOSIS — R92.8 ABNORMAL SCREENING MAMMOGRAM: ICD-10-CM

## 2022-07-29 DIAGNOSIS — N30.00 ACUTE CYSTITIS WITHOUT HEMATURIA: Primary | ICD-10-CM

## 2022-07-29 DIAGNOSIS — S09.90XA CLOSED HEAD INJURY, INITIAL ENCOUNTER: ICD-10-CM
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
## 2022-07-29 LAB
ANION GAP SERPL CALCULATED.3IONS-SCNC: 10 MMOL/L (ref 3–16)
BACTERIA: ABNORMAL /HPF
BASOPHILS ABSOLUTE: 0 K/UL (ref 0–0.2)
BASOPHILS RELATIVE PERCENT: 0.7 %
BILIRUBIN URINE: NEGATIVE
BLOOD, URINE: NEGATIVE
BUN BLDV-MCNC: 14 MG/DL (ref 7–20)
CALCIUM SERPL-MCNC: 10.6 MG/DL (ref 8.3–10.6)
CHLORIDE BLD-SCNC: 106 MMOL/L (ref 99–110)
CLARITY: ABNORMAL
CO2: 27 MMOL/L (ref 21–32)
COLOR: YELLOW
CREAT SERPL-MCNC: 0.9 MG/DL (ref 0.6–1.2)
EOSINOPHILS ABSOLUTE: 0.2 K/UL (ref 0–0.6)
EOSINOPHILS RELATIVE PERCENT: 3.7 %
EPITHELIAL CELLS, UA: 9 /HPF (ref 0–5)
GFR AFRICAN AMERICAN: >60
GFR NON-AFRICAN AMERICAN: >60
GLUCOSE BLD-MCNC: 95 MG/DL (ref 70–99)
GLUCOSE URINE: NEGATIVE MG/DL
HCT VFR BLD CALC: 34.9 % (ref 36–48)
HEMOGLOBIN: 12.4 G/DL (ref 12–16)
HYALINE CASTS: 4 /LPF (ref 0–8)
KETONES, URINE: ABNORMAL MG/DL
LEUKOCYTE ESTERASE, URINE: ABNORMAL
LYMPHOCYTES ABSOLUTE: 0.7 K/UL (ref 1–5.1)
LYMPHOCYTES RELATIVE PERCENT: 13.3 %
MCH RBC QN AUTO: 33.3 PG (ref 26–34)
MCHC RBC AUTO-ENTMCNC: 35.6 G/DL (ref 31–36)
MCV RBC AUTO: 93.6 FL (ref 80–100)
MICROSCOPIC EXAMINATION: YES
MONOCYTES ABSOLUTE: 0.5 K/UL (ref 0–1.3)
MONOCYTES RELATIVE PERCENT: 9.7 %
NEUTROPHILS ABSOLUTE: 3.7 K/UL (ref 1.7–7.7)
NEUTROPHILS RELATIVE PERCENT: 72.6 %
NITRITE, URINE: NEGATIVE
PDW BLD-RTO: 12.1 % (ref 12.4–15.4)
PH UA: 5.5 (ref 5–8)
PLATELET # BLD: 186 K/UL (ref 135–450)
PMV BLD AUTO: 8.6 FL (ref 5–10.5)
POTASSIUM SERPL-SCNC: 3.9 MMOL/L (ref 3.5–5.1)
PROTEIN UA: ABNORMAL MG/DL
RBC # BLD: 3.73 M/UL (ref 4–5.2)
RBC UA: 3 /HPF (ref 0–4)
SODIUM BLD-SCNC: 143 MMOL/L (ref 136–145)
SPECIFIC GRAVITY UA: 1.03 (ref 1–1.03)
TROPONIN: <0.01 NG/ML
URINE REFLEX TO CULTURE: YES
URINE TYPE: ABNORMAL
UROBILINOGEN, URINE: 1 E.U./DL
WBC # BLD: 5.1 K/UL (ref 4–11)
WBC UA: 40 /HPF (ref 0–5)

## 2022-07-29 PROCEDURE — 99284 EMERGENCY DEPT VISIT MOD MDM: CPT

## 2022-07-29 PROCEDURE — 85025 COMPLETE CBC W/AUTO DIFF WBC: CPT

## 2022-07-29 PROCEDURE — 84484 ASSAY OF TROPONIN QUANT: CPT

## 2022-07-29 PROCEDURE — 80048 BASIC METABOLIC PNL TOTAL CA: CPT

## 2022-07-29 PROCEDURE — 70450 CT HEAD/BRAIN W/O DYE: CPT

## 2022-07-29 PROCEDURE — 81001 URINALYSIS AUTO W/SCOPE: CPT

## 2022-07-29 PROCEDURE — 87086 URINE CULTURE/COLONY COUNT: CPT

## 2022-07-29 PROCEDURE — 77065 DX MAMMO INCL CAD UNI: CPT

## 2022-07-29 PROCEDURE — 72125 CT NECK SPINE W/O DYE: CPT

## 2022-07-29 PROCEDURE — 36415 COLL VENOUS BLD VENIPUNCTURE: CPT

## 2022-07-29 RX ORDER — CEFUROXIME AXETIL 250 MG/1
250 TABLET ORAL 2 TIMES DAILY
Qty: 10 TABLET | Refills: 0 | Status: SHIPPED | OUTPATIENT
Start: 2022-07-29 | End: 2022-08-03

## 2022-07-29 RX ORDER — ACETAMINOPHEN 500 MG
500 TABLET ORAL EVERY 6 HOURS PRN
Qty: 30 TABLET | Refills: 0 | Status: SHIPPED | OUTPATIENT
Start: 2022-07-29

## 2022-07-29 ASSESSMENT — ENCOUNTER SYMPTOMS
EYE REDNESS: 0
ABDOMINAL PAIN: 0
RHINORRHEA: 0
SHORTNESS OF BREATH: 0
SORE THROAT: 0

## 2022-07-29 ASSESSMENT — PAIN - FUNCTIONAL ASSESSMENT
PAIN_FUNCTIONAL_ASSESSMENT: NONE - DENIES PAIN
PAIN_FUNCTIONAL_ASSESSMENT: NONE - DENIES PAIN

## 2022-07-29 NOTE — ED PROVIDER NOTES
[facility]  eMERGENCY dEPARTMENT eNCOUnter      Pt Name: Phoebe Peacock  MRN: [id number]  [place] [birth year]  Date of evaluation: 7/29/2022  Provider: Chaz Abernathy MD    CHIEF COMPLAINT       Chief Complaint   Patient presents with    Loss of Consciousness     Code from mammogram for syncopal episode after standing up         HISTORY OF PRESENT ILLNESS   (Location/Symptom, Timing/Onset,Context/Setting, Quality, Duration, Modifying Factors, Severity)  Note limiting factors. Phoebe Peacock is a 67 y.o. female who presents to the emergency department with concern for syncope. The patient denies syncope. She states she was getting her mammogram when she stood up and lost her footing causing her to fall and hit her head. She had an episode of loss of consciousness. She reports some associated posterior neck discomfort. No weakness or numbness to her hands or legs. She states otherwise she has been well. She denies any chest pain, palpitations, back pain, extremity pain. She only complains of a mild headache and some neck discomfort after the fall. HPI    NursingNotes were reviewed. REVIEW OF SYSTEMS    (2-9 systems for level 4, 10 or more for level 5)     Review of Systems   Constitutional:  Negative for fever. HENT:  Negative for rhinorrhea and sore throat. Eyes:  Negative for redness. Respiratory:  Negative for shortness of breath. Cardiovascular:  Negative for chest pain. Gastrointestinal:  Negative for abdominal pain. Genitourinary:  Negative for flank pain. Musculoskeletal:  Positive for neck pain. Skin:  Negative for rash. Neurological:  Positive for headaches. Loss of consciousness   Hematological:  Negative for adenopathy. Psychiatric/Behavioral:  Negative for confusion. Except as noted above the remainder of the review of systems was reviewed and negative.        PAST MEDICAL HISTORY     Past Medical History: Diagnosis Date    Anxiety     Balance disorder     unknown etiology    GERD (gastroesophageal reflux disease)     History of kidney stones     Hx of blood clots     x 1 from use of BCPs    Hyperlipidemia     Hypertension     Infection 11/2015    gastric band    Liver cyst     \"possible\"    Olfactory nerve injury     mostly resolved    Right ear injury 20 yrs ago    Thyroid disease          SURGICALHISTORY       Past Surgical History:   Procedure Laterality Date    ABDOMINOPLASTY  2012    also cosmetic surgery on [facility] 2    exc. cyst lumbar    CHOLECYSTECTOMY      COLONOSCOPY      COLONOSCOPY N/A 8/19/2019    COLONOSCOPY DIAGNOSTIC performed by Jarrett Pedro MD at [facility]      breast reduction    ENDOSCOPY, COLON, DIAGNOSTIC      EYE SURGERY      cataracts    [address]    also breast reduction    FACIAL COSMETIC SURGERY  [id number]     FACELIFT, BROWLIFT, UPPER AND LOWER BLEPHEROPLASTY AND    GASTRIC BAND  2011    also port removal subsequently (3/2015)    HYSTERECTOMY (CERVIX STATUS UNKNOWN)  1989    OTHER SURGICAL HISTORY  11/24/2015    LAPAROSCOPIC GASTRIC BAND REMOVAL         OVARIAN CYST SURGERY  1987    UTERINE FIBROID SURGERY  1974         CURRENT MEDICATIONS       Previous Medications    ASPIRIN 81 MG CHEWABLE TABLET    Take 81 mg by mouth daily    DOCUSATE SODIUM (COLACE) 100 MG CAPSULE    Take 100 mg by mouth nightly. ESTRADIOL (ESTRACE) 0.1 MG/GM VAGINAL CREAM    Place 2 g vaginally daily    IBUPROFEN (ADVIL;MOTRIN) 200 MG TABLET    Take 200 mg by mouth every 6 hours as needed for Pain    LEVOTHYROXINE (SYNTHROID) 50 MCG TABLET    25 mcg    LOVASTATIN (MEVACOR) 40 MG TABLET        METFORMIN (GLUCOPHAGE) 500 MG TABLET    Take 500 mg by mouth in the morning and 500 mg in the evening. Take with meals.     MULTIPLE VITAMINS-MINERALS (THERAPEUTIC MULTIVITAMIN-MINERALS) TABLET    Take 1 tablet by mouth daily    SERTRALINE (ZOLOFT) 100 MG TABLET    Take 100 mg by mouth nightly. VALSARTAN-HYDROCHLOROTHIAZIDE (DIOVAN-HCT) 160-25 MG PER TABLET    Take 1 tablet by mouth daily       ALLERGIES     Demerol, Erythromycin, and Pcn [penicillins]    FAMILY HISTORY     History reviewed. No pertinent family history. SOCIAL HISTORY       Social History     Socioeconomic History    Marital status:      Spouse name: None    Number of children: None    Years of education: None    Highest education level: None   Tobacco Use    Smoking status: Never    Smokeless tobacco: Never   Substance and Sexual Activity    Alcohol use: No    Drug use: No       SCREENINGS    Mary Coma Scale  Eye Opening: Spontaneous  Best Verbal Response: Oriented  Best Motor Response: Obeys commands  Mary Coma Scale Score: 15        PHYSICAL EXAM    (up to 7 for level 4, 8 or more for level 5)     ED Triage Vitals [07/29/22 1506]   BP Temp Temp Source Heart Rate Resp SpO2 Height Weight   (!) 182/81 97.6 °F (36.4 °C) Oral 68 16 93 % -- 157 lb 13.6 oz (71.6 kg)       Physical Exam  Vitals and nursing note reviewed. Constitutional:       Appearance: She is well-developed. She is not diaphoretic. HENT:      Head: Normocephalic and atraumatic. Comments: No blood or fluid from the ears or nose. Mouth/Throat:      Mouth: Mucous membranes are moist.   Eyes:      General:         Right eye: No discharge. Left eye: No discharge. Conjunctiva/sclera: Conjunctivae normal.   Neck:      Comments: Patient was in a c-collar when I examined her. She has some posterior bony tenderness. She was left in a c-collar. Cardiovascular:      Rate and Rhythm: Normal rate. Pulmonary:      Effort: Pulmonary effort is normal. No respiratory distress. Breath sounds: No wheezing, rhonchi or rales. Abdominal:      Palpations: Abdomen is soft. Tenderness: There is no abdominal tenderness. There is no guarding or rebound.    Musculoskeletal:         General: Normal range of motion. Cervical back: Neck supple. Skin:     General: Skin is warm and dry. Capillary Refill: Capillary refill takes less than 2 seconds. Neurological:      Mental Status: She is alert and oriented to person, place, and time. GCS: GCS eye subscore is 4. GCS verbal subscore is 5. GCS motor subscore is 6. Psychiatric:         Behavior: Behavior normal.       DIAGNOSTIC RESULTS     EKG: All EKG's are interpreted by the Emergency Department Physician who either signs or Co-signsthis chart in the absence of a cardiologist.        RADIOLOGY:   Liseth Chiles such as CT, Ultrasound and MRI are read by the radiologist. Plain radiographic images are visualized and preliminarily interpreted by the emergency physician with the below findings:        Interpretation per the Radiologist below, if available at the time ofthis note:    CT CERVICAL SPINE WO CONTRAST   Final Result   No acute abnormality of the cervical spine. CT HEAD WO CONTRAST   Final Result   Small amount of soft tissue swelling noted over the left frontal region as   above. No gross intracerebral hemorrhage or midline shift. Age-related   changes and chronic microvascular disease noted as above.                ED BEDSIDE ULTRASOUND:   Performed by ED Physician - none    LABS:  Labs Reviewed   CBC WITH AUTO DIFFERENTIAL - Abnormal; Notable for the following components:       Result Value    RBC 3.73 (*)     Hematocrit 34.9 (*)     RDW 12.1 (*)     Lymphocytes Absolute 0.7 (*)     All other components within normal limits   URINALYSIS WITH REFLEX TO CULTURE - Abnormal; Notable for the following components:    Clarity, UA CLOUDY (*)     Ketones, Urine TRACE (*)     Protein, UA TRACE (*)     Leukocyte Esterase, Urine MODERATE (*)     All other components within normal limits   MICROSCOPIC URINALYSIS - Abnormal; Notable for the following components:    WBC, UA 40 (*)     Epithelial Cells, UA 9 (*)     All other components within normal limits   CULTURE, URINE   BASIC METABOLIC PANEL   TROPONIN       All other labs were within normal range or not returned as of this dictation. EMERGENCY DEPARTMENT COURSE and DIFFERENTIAL DIAGNOSIS/MDM:   Vitals:    Vitals:    07/29/22 1506   BP: (!) 182/81   Pulse: 68   Resp: 16   Temp: 97.6 °F (36.4 °C)   TempSrc: Oral   SpO2: 93%   Weight: 157 lb 13.6 oz (71.6 kg)           MDM  Number of Diagnoses or Management Options  Acute cystitis without hematuria  Anemia, unspecified type  Closed head injury, initial encounter  Fall, accidental, initial encounter  Diagnosis management comments: EventsThe patient denies any syncope. She denies any palpitations. She denies any chest pain. She states she lost her footing causing her to fall. Her work-up in the ED reveals significant anemia. Hematocrit was mildly decreased at 34.9. Her troponin was normal.  Her BMP did not show any dehydration. Her urinalysis did have 40 white blood cells and moderate leukocyte Estrace but she also had 9 epithelial cells. I went and spoke to the patient about her urinalysis and she now on repeat questioning does endorse some frequency. I will start her on some antibiotics for her urinary tract infection. I started her on cefuroxime. Her penicillin allergy is rash and I think is a low risk of cross reactivity with a cephalosporin. She does have a frontal contusion noted on her scalp not on her head CT. Her head CT did not show any acute intracerebral injury. She does have some soft tissue swelling over the left frontal region is wheezing on exam.  Her CT of her cervical spine did not show any abnormalities of her cervical spine. She had normal strength and sensation I do not suspect a cord injury at this time. Return precautions were given. The patient has a follow-up with her primary care provider in 1 week. Is this patient to be included in the SEP-1 Core Measure?   No   Exclusion criteria - the patient is NOT to be included for SEP-1 Core Measure due to:  2+ SIRS criteria are not met     CRITICAL CARE TIME   Total Critical Care time was 15 minutes, excluding separately reportable procedures. There was a high probability of clinically significant/life threatening deterioration in the patient's condition which required my urgent intervention. CONSULTS:  None    PROCEDURES:  Unless otherwise noted below, none     Procedures    FINAL IMPRESSION      1. Acute cystitis without hematuria    2. Anemia, unspecified type    3. Fall, accidental, initial encounter    4. Closed head injury, initial encounter          DISPOSITION/PLAN   DISPOSITION Decision To Discharge 07/29/2022 05:58:36 PM      PATIENT REFERRED TO:  Armaan Warren  [facility] [address] [place] [address]. Ko    Schedule an appointment as soon as possible for a visit in 1 week      DISCHARGE MEDICATIONS:  New Prescriptions    ACETAMINOPHEN (TYLENOL) 500 MG TABLET    Take 1 tablet by mouth every 6 hours as needed for Pain    CEFUROXIME (CEFTIN) 250 MG TABLET    Take 1 tablet by mouth in the morning and 1 tablet before bedtime. Do all this for 5 days.           (Please note that portions of this note were completed with a voice recognition program.Efforts were made to edit the dictations but occasionally words are mis-transcribed.)    Shady Weston MD (electronically signed)  Attending Emergency Physician          Shady Weston MD  07/29/22 6517

## 2022-07-30 LAB
ORGANISM: ABNORMAL
URINE CULTURE, ROUTINE: ABNORMAL

## 2023-01-02 ENCOUNTER — HOSPITAL ENCOUNTER (INPATIENT)
Age: 78
LOS: 2 days | Discharge: HOME | DRG: 660 | End: 2023-01-05
Attending: EMERGENCY MEDICINE | Admitting: INTERNAL MEDICINE
Payer: MEDICARE

## 2023-01-02 ENCOUNTER — APPOINTMENT (OUTPATIENT)
Dept: CT IMAGING | Age: 78
DRG: 660 | End: 2023-01-02
Payer: MEDICARE

## 2023-01-02 DIAGNOSIS — E87.6 HYPOKALEMIA: ICD-10-CM

## 2023-01-02 DIAGNOSIS — K59.01 SLOW TRANSIT CONSTIPATION: ICD-10-CM

## 2023-01-02 DIAGNOSIS — N23 RENAL COLIC ON LEFT SIDE: ICD-10-CM

## 2023-01-02 DIAGNOSIS — N20.1 URETEROLITHIASIS: Primary | ICD-10-CM

## 2023-01-02 LAB
A/G RATIO: 1.8 (ref 1.1–2.2)
ALBUMIN SERPL-MCNC: 4.4 G/DL (ref 3.4–5)
ALP BLD-CCNC: 111 U/L (ref 40–129)
ALT SERPL-CCNC: <5 U/L (ref 10–40)
ANION GAP SERPL CALCULATED.3IONS-SCNC: 14 MMOL/L (ref 3–16)
AST SERPL-CCNC: 13 U/L (ref 15–37)
BASOPHILS ABSOLUTE: 0 K/UL (ref 0–0.2)
BASOPHILS RELATIVE PERCENT: 0.3 %
BILIRUB SERPL-MCNC: 0.4 MG/DL (ref 0–1)
BILIRUBIN URINE: ABNORMAL
BLOOD, URINE: ABNORMAL
BUN BLDV-MCNC: 14 MG/DL (ref 7–20)
CALCIUM SERPL-MCNC: 10.2 MG/DL (ref 8.3–10.6)
CHLORIDE BLD-SCNC: 98 MMOL/L (ref 99–110)
CLARITY: ABNORMAL
CO2: 27 MMOL/L (ref 21–32)
COLOR: ABNORMAL
COMMENT UA: ABNORMAL
CREAT SERPL-MCNC: 1 MG/DL (ref 0.6–1.2)
EOSINOPHILS ABSOLUTE: 0 K/UL (ref 0–0.6)
EOSINOPHILS RELATIVE PERCENT: 0.2 %
EPITHELIAL CELLS, UA: ABNORMAL /HPF (ref 0–5)
GFR SERPL CREATININE-BSD FRML MDRD: 58 ML/MIN/{1.73_M2}
GLUCOSE BLD-MCNC: 130 MG/DL (ref 70–99)
GLUCOSE URINE: NEGATIVE MG/DL
HCT VFR BLD CALC: 41 % (ref 36–48)
HEMOGLOBIN: 13.4 G/DL (ref 12–16)
KETONES, URINE: 15 MG/DL
LACTIC ACID: 1.1 MMOL/L (ref 0.4–2)
LEUKOCYTE ESTERASE, URINE: ABNORMAL
LYMPHOCYTES ABSOLUTE: 0.5 K/UL (ref 1–5.1)
LYMPHOCYTES RELATIVE PERCENT: 4.7 %
MAGNESIUM: 1.8 MG/DL (ref 1.8–2.4)
MCH RBC QN AUTO: 31 PG (ref 26–34)
MCHC RBC AUTO-ENTMCNC: 32.7 G/DL (ref 31–36)
MCV RBC AUTO: 94.9 FL (ref 80–100)
MICROSCOPIC EXAMINATION: YES
MONOCYTES ABSOLUTE: 0.5 K/UL (ref 0–1.3)
MONOCYTES RELATIVE PERCENT: 4.6 %
NEUTROPHILS ABSOLUTE: 10.1 K/UL (ref 1.7–7.7)
NEUTROPHILS RELATIVE PERCENT: 90.2 %
NITRITE, URINE: NEGATIVE
PDW BLD-RTO: 12.1 % (ref 12.4–15.4)
PH UA: 6 (ref 5–8)
PLATELET # BLD: 209 K/UL (ref 135–450)
PMV BLD AUTO: 10.1 FL (ref 5–10.5)
POTASSIUM REFLEX MAGNESIUM: 3.1 MMOL/L (ref 3.5–5.1)
PROTEIN UA: 100 MG/DL
RBC # BLD: 4.32 M/UL (ref 4–5.2)
RBC UA: >100 /HPF (ref 0–4)
SODIUM BLD-SCNC: 139 MMOL/L (ref 136–145)
SPECIFIC GRAVITY UA: 1.02 (ref 1–1.03)
TOTAL PROTEIN: 6.8 G/DL (ref 6.4–8.2)
URINE REFLEX TO CULTURE: ABNORMAL
URINE TYPE: ABNORMAL
UROBILINOGEN, URINE: 1 E.U./DL
WBC # BLD: 11.1 K/UL (ref 4–11)
WBC UA: ABNORMAL /HPF (ref 0–5)

## 2023-01-02 PROCEDURE — 81001 URINALYSIS AUTO W/SCOPE: CPT

## 2023-01-02 PROCEDURE — 74177 CT ABD & PELVIS W/CONTRAST: CPT

## 2023-01-02 PROCEDURE — 6360000004 HC RX CONTRAST MEDICATION: Performed by: EMERGENCY MEDICINE

## 2023-01-02 PROCEDURE — 6360000002 HC RX W HCPCS: Performed by: EMERGENCY MEDICINE

## 2023-01-02 PROCEDURE — 6370000000 HC RX 637 (ALT 250 FOR IP): Performed by: EMERGENCY MEDICINE

## 2023-01-02 PROCEDURE — 99285 EMERGENCY DEPT VISIT HI MDM: CPT

## 2023-01-02 PROCEDURE — 2580000003 HC RX 258: Performed by: EMERGENCY MEDICINE

## 2023-01-02 PROCEDURE — 80053 COMPREHEN METABOLIC PANEL: CPT

## 2023-01-02 PROCEDURE — 96374 THER/PROPH/DIAG INJ IV PUSH: CPT

## 2023-01-02 PROCEDURE — 96372 THER/PROPH/DIAG INJ SC/IM: CPT

## 2023-01-02 PROCEDURE — 85025 COMPLETE CBC W/AUTO DIFF WBC: CPT

## 2023-01-02 PROCEDURE — 83735 ASSAY OF MAGNESIUM: CPT

## 2023-01-02 PROCEDURE — 83605 ASSAY OF LACTIC ACID: CPT

## 2023-01-02 RX ORDER — ONDANSETRON 2 MG/ML
8 INJECTION INTRAMUSCULAR; INTRAVENOUS ONCE
Status: COMPLETED | OUTPATIENT
Start: 2023-01-02 | End: 2023-01-02

## 2023-01-02 RX ORDER — 0.9 % SODIUM CHLORIDE 0.9 %
1000 INTRAVENOUS SOLUTION INTRAVENOUS ONCE
Status: COMPLETED | OUTPATIENT
Start: 2023-01-02 | End: 2023-01-02

## 2023-01-02 RX ORDER — DICYCLOMINE HYDROCHLORIDE 10 MG/ML
20 INJECTION INTRAMUSCULAR ONCE
Status: COMPLETED | OUTPATIENT
Start: 2023-01-02 | End: 2023-01-02

## 2023-01-02 RX ORDER — POTASSIUM CHLORIDE 750 MG/1
40 TABLET, FILM COATED, EXTENDED RELEASE ORAL ONCE
Status: COMPLETED | OUTPATIENT
Start: 2023-01-02 | End: 2023-01-02

## 2023-01-02 RX ADMIN — POTASSIUM CHLORIDE 40 MEQ: 750 TABLET, FILM COATED, EXTENDED RELEASE ORAL at 23:37

## 2023-01-02 RX ADMIN — SODIUM CHLORIDE 1000 ML: 9 INJECTION, SOLUTION INTRAVENOUS at 22:32

## 2023-01-02 RX ADMIN — ONDANSETRON 8 MG: 2 INJECTION INTRAMUSCULAR; INTRAVENOUS at 22:25

## 2023-01-02 RX ADMIN — DICYCLOMINE HYDROCHLORIDE 20 MG: 20 INJECTION, SOLUTION INTRAMUSCULAR at 22:25

## 2023-01-02 RX ADMIN — IOPAMIDOL 75 ML: 755 INJECTION, SOLUTION INTRAVENOUS at 23:34

## 2023-01-02 ASSESSMENT — PAIN DESCRIPTION - ORIENTATION: ORIENTATION: MID

## 2023-01-02 ASSESSMENT — PAIN DESCRIPTION - LOCATION
LOCATION: ABDOMEN
LOCATION: ABDOMEN

## 2023-01-02 ASSESSMENT — PAIN - FUNCTIONAL ASSESSMENT: PAIN_FUNCTIONAL_ASSESSMENT: INTOLERABLE, UNABLE TO DO ANY ACTIVE OR PASSIVE ACTIVITIES

## 2023-01-02 ASSESSMENT — PAIN DESCRIPTION - FREQUENCY: FREQUENCY: CONTINUOUS

## 2023-01-02 ASSESSMENT — PAIN DESCRIPTION - PAIN TYPE: TYPE: ACUTE PAIN

## 2023-01-02 ASSESSMENT — PAIN DESCRIPTION - DESCRIPTORS: DESCRIPTORS: ACHING;SHARP

## 2023-01-02 ASSESSMENT — PAIN SCALES - GENERAL
PAINLEVEL_OUTOF10: 5
PAINLEVEL_OUTOF10: 5

## 2023-01-02 ASSESSMENT — PAIN DESCRIPTION - ONSET: ONSET: AWAKENED FROM SLEEP

## 2023-01-03 ENCOUNTER — ANESTHESIA (OUTPATIENT)
Dept: OPERATING ROOM | Age: 78
DRG: 660 | End: 2023-01-03
Payer: MEDICARE

## 2023-01-03 ENCOUNTER — APPOINTMENT (OUTPATIENT)
Dept: GENERAL RADIOLOGY | Age: 78
DRG: 660 | End: 2023-01-03
Payer: MEDICARE

## 2023-01-03 ENCOUNTER — ANESTHESIA EVENT (OUTPATIENT)
Dept: OPERATING ROOM | Age: 78
DRG: 660 | End: 2023-01-03
Payer: MEDICARE

## 2023-01-03 PROBLEM — N20.0 URINARY TRACT OBSTRUCTION BY KIDNEY STONE: Status: ACTIVE | Noted: 2023-01-03

## 2023-01-03 PROBLEM — N13.8 URINARY TRACT OBSTRUCTION BY KIDNEY STONE: Status: ACTIVE | Noted: 2023-01-03

## 2023-01-03 LAB
ALBUMIN SERPL-MCNC: 3.5 G/DL (ref 3.4–5)
ANION GAP SERPL CALCULATED.3IONS-SCNC: 9 MMOL/L (ref 3–16)
BUN BLDV-MCNC: 12 MG/DL (ref 7–20)
CALCIUM SERPL-MCNC: 9.5 MG/DL (ref 8.3–10.6)
CHLORIDE BLD-SCNC: 106 MMOL/L (ref 99–110)
CO2: 27 MMOL/L (ref 21–32)
CREAT SERPL-MCNC: 0.8 MG/DL (ref 0.6–1.2)
GFR SERPL CREATININE-BSD FRML MDRD: >60 ML/MIN/{1.73_M2}
GLUCOSE BLD-MCNC: 92 MG/DL (ref 70–99)
GLUCOSE BLD-MCNC: 96 MG/DL (ref 70–99)
MAGNESIUM: 1.9 MG/DL (ref 1.8–2.4)
PERFORMED ON: NORMAL
PHOSPHORUS: 3 MG/DL (ref 2.5–4.9)
POTASSIUM SERPL-SCNC: 3.5 MMOL/L (ref 3.5–5.1)
SODIUM BLD-SCNC: 142 MMOL/L (ref 136–145)

## 2023-01-03 PROCEDURE — 3600000012 HC SURGERY LEVEL 2 ADDTL 15MIN: Performed by: UROLOGY

## 2023-01-03 PROCEDURE — 2709999900 HC NON-CHARGEABLE SUPPLY: Performed by: UROLOGY

## 2023-01-03 PROCEDURE — 2580000003 HC RX 258

## 2023-01-03 PROCEDURE — 80069 RENAL FUNCTION PANEL: CPT

## 2023-01-03 PROCEDURE — 74018 RADEX ABDOMEN 1 VIEW: CPT

## 2023-01-03 PROCEDURE — 3700000000 HC ANESTHESIA ATTENDED CARE: Performed by: UROLOGY

## 2023-01-03 PROCEDURE — 0TC78ZZ EXTIRPATION OF MATTER FROM LEFT URETER, VIA NATURAL OR ARTIFICIAL OPENING ENDOSCOPIC: ICD-10-PCS | Performed by: UROLOGY

## 2023-01-03 PROCEDURE — 2500000003 HC RX 250 WO HCPCS

## 2023-01-03 PROCEDURE — 83735 ASSAY OF MAGNESIUM: CPT

## 2023-01-03 PROCEDURE — 6360000002 HC RX W HCPCS: Performed by: INTERNAL MEDICINE

## 2023-01-03 PROCEDURE — 7100000000 HC PACU RECOVERY - FIRST 15 MIN: Performed by: UROLOGY

## 2023-01-03 PROCEDURE — 3600000002 HC SURGERY LEVEL 2 BASE: Performed by: UROLOGY

## 2023-01-03 PROCEDURE — 6360000002 HC RX W HCPCS

## 2023-01-03 PROCEDURE — 6370000000 HC RX 637 (ALT 250 FOR IP): Performed by: UROLOGY

## 2023-01-03 PROCEDURE — C1758 CATHETER, URETERAL: HCPCS | Performed by: UROLOGY

## 2023-01-03 PROCEDURE — 2580000003 HC RX 258: Performed by: UROLOGY

## 2023-01-03 PROCEDURE — 6370000000 HC RX 637 (ALT 250 FOR IP): Performed by: INTERNAL MEDICINE

## 2023-01-03 PROCEDURE — 1200000000 HC SEMI PRIVATE

## 2023-01-03 PROCEDURE — 6360000002 HC RX W HCPCS: Performed by: UROLOGY

## 2023-01-03 PROCEDURE — 36415 COLL VENOUS BLD VENIPUNCTURE: CPT

## 2023-01-03 PROCEDURE — 6360000002 HC RX W HCPCS: Performed by: EMERGENCY MEDICINE

## 2023-01-03 PROCEDURE — 7100000001 HC PACU RECOVERY - ADDTL 15 MIN: Performed by: UROLOGY

## 2023-01-03 PROCEDURE — 82365 CALCULUS SPECTROSCOPY: CPT

## 2023-01-03 PROCEDURE — C2617 STENT, NON-COR, TEM W/O DEL: HCPCS | Performed by: UROLOGY

## 2023-01-03 PROCEDURE — 2720000010 HC SURG SUPPLY STERILE: Performed by: UROLOGY

## 2023-01-03 PROCEDURE — 0T778DZ DILATION OF LEFT URETER WITH INTRALUMINAL DEVICE, VIA NATURAL OR ARTIFICIAL OPENING ENDOSCOPIC: ICD-10-PCS | Performed by: UROLOGY

## 2023-01-03 PROCEDURE — C1769 GUIDE WIRE: HCPCS | Performed by: UROLOGY

## 2023-01-03 PROCEDURE — 88300 SURGICAL PATH GROSS: CPT

## 2023-01-03 PROCEDURE — 3700000001 HC ADD 15 MINUTES (ANESTHESIA): Performed by: UROLOGY

## 2023-01-03 PROCEDURE — 2580000003 HC RX 258: Performed by: INTERNAL MEDICINE

## 2023-01-03 DEVICE — URETERAL STENT
Type: IMPLANTABLE DEVICE | Site: URETER | Status: FUNCTIONAL
Brand: CONTOUR™

## 2023-01-03 RX ORDER — FENTANYL CITRATE 50 UG/ML
50 INJECTION, SOLUTION INTRAMUSCULAR; INTRAVENOUS ONCE
Status: COMPLETED | OUTPATIENT
Start: 2023-01-03 | End: 2023-01-03

## 2023-01-03 RX ORDER — SODIUM CHLORIDE 9 MG/ML
INJECTION, SOLUTION INTRAVENOUS CONTINUOUS PRN
Status: DISCONTINUED | OUTPATIENT
Start: 2023-01-03 | End: 2023-01-03 | Stop reason: SDUPTHER

## 2023-01-03 RX ORDER — PSEUDOEPHEDRINE HCL 30 MG
100 TABLET ORAL 2 TIMES DAILY PRN
Qty: 60 CAPSULE | Refills: 0 | Status: SHIPPED | OUTPATIENT
Start: 2023-01-03

## 2023-01-03 RX ORDER — DEXAMETHASONE SODIUM PHOSPHATE 4 MG/ML
INJECTION, SOLUTION INTRA-ARTICULAR; INTRALESIONAL; INTRAMUSCULAR; INTRAVENOUS; SOFT TISSUE PRN
Status: DISCONTINUED | OUTPATIENT
Start: 2023-01-03 | End: 2023-01-03 | Stop reason: SDUPTHER

## 2023-01-03 RX ORDER — ONDANSETRON 2 MG/ML
4 INJECTION INTRAMUSCULAR; INTRAVENOUS EVERY 6 HOURS PRN
Status: DISCONTINUED | OUTPATIENT
Start: 2023-01-03 | End: 2023-01-05 | Stop reason: HOSPADM

## 2023-01-03 RX ORDER — OXYCODONE HYDROCHLORIDE 5 MG/1
5 TABLET ORAL PRN
Status: DISCONTINUED | OUTPATIENT
Start: 2023-01-03 | End: 2023-01-03 | Stop reason: HOSPADM

## 2023-01-03 RX ORDER — PROPOFOL 10 MG/ML
INJECTION, EMULSION INTRAVENOUS PRN
Status: DISCONTINUED | OUTPATIENT
Start: 2023-01-03 | End: 2023-01-03 | Stop reason: SDUPTHER

## 2023-01-03 RX ORDER — OXYCODONE HYDROCHLORIDE 10 MG/1
10 TABLET ORAL PRN
Status: DISCONTINUED | OUTPATIENT
Start: 2023-01-03 | End: 2023-01-03 | Stop reason: HOSPADM

## 2023-01-03 RX ORDER — ONDANSETRON 2 MG/ML
4 INJECTION INTRAMUSCULAR; INTRAVENOUS EVERY 6 HOURS PRN
Status: DISCONTINUED | OUTPATIENT
Start: 2023-01-03 | End: 2023-01-03

## 2023-01-03 RX ORDER — MORPHINE SULFATE 4 MG/ML
4 INJECTION, SOLUTION INTRAMUSCULAR; INTRAVENOUS
Status: DISCONTINUED | OUTPATIENT
Start: 2023-01-03 | End: 2023-01-05 | Stop reason: HOSPADM

## 2023-01-03 RX ORDER — SODIUM CHLORIDE 9 MG/ML
INJECTION, SOLUTION INTRAVENOUS PRN
Status: DISCONTINUED | OUTPATIENT
Start: 2023-01-03 | End: 2023-01-05 | Stop reason: HOSPADM

## 2023-01-03 RX ORDER — ONDANSETRON 4 MG/1
4 TABLET, ORALLY DISINTEGRATING ORAL EVERY 8 HOURS PRN
Status: DISCONTINUED | OUTPATIENT
Start: 2023-01-03 | End: 2023-01-03

## 2023-01-03 RX ORDER — SODIUM CHLORIDE 0.9 % (FLUSH) 0.9 %
5-40 SYRINGE (ML) INJECTION EVERY 12 HOURS SCHEDULED
Status: DISCONTINUED | OUTPATIENT
Start: 2023-01-03 | End: 2023-01-05 | Stop reason: HOSPADM

## 2023-01-03 RX ORDER — SODIUM CHLORIDE 0.9 % (FLUSH) 0.9 %
5-40 SYRINGE (ML) INJECTION PRN
Status: DISCONTINUED | OUTPATIENT
Start: 2023-01-03 | End: 2023-01-05 | Stop reason: HOSPADM

## 2023-01-03 RX ORDER — SODIUM CHLORIDE AND POTASSIUM CHLORIDE 300; 900 MG/100ML; MG/100ML
INJECTION, SOLUTION INTRAVENOUS CONTINUOUS
Status: DISCONTINUED | OUTPATIENT
Start: 2023-01-03 | End: 2023-01-03

## 2023-01-03 RX ORDER — MORPHINE SULFATE 2 MG/ML
2 INJECTION, SOLUTION INTRAMUSCULAR; INTRAVENOUS
Status: DISCONTINUED | OUTPATIENT
Start: 2023-01-03 | End: 2023-01-03

## 2023-01-03 RX ORDER — SUCCINYLCHOLINE/SOD CL,ISO/PF 200MG/10ML
SYRINGE (ML) INTRAVENOUS PRN
Status: DISCONTINUED | OUTPATIENT
Start: 2023-01-03 | End: 2023-01-03 | Stop reason: SDUPTHER

## 2023-01-03 RX ORDER — OXYCODONE HYDROCHLORIDE 5 MG/1
5 TABLET ORAL EVERY 4 HOURS PRN
Status: DISCONTINUED | OUTPATIENT
Start: 2023-01-03 | End: 2023-01-05 | Stop reason: HOSPADM

## 2023-01-03 RX ORDER — POTASSIUM CHLORIDE 20 MEQ/1
40 TABLET, EXTENDED RELEASE ORAL PRN
Status: DISCONTINUED | OUTPATIENT
Start: 2023-01-03 | End: 2023-01-05 | Stop reason: HOSPADM

## 2023-01-03 RX ORDER — POLYETHYLENE GLYCOL 3350 17 G/17G
17 POWDER, FOR SOLUTION ORAL DAILY PRN
Qty: 527 G | Refills: 1 | Status: SHIPPED | OUTPATIENT
Start: 2023-01-03 | End: 2023-02-02

## 2023-01-03 RX ORDER — DIPHENHYDRAMINE HYDROCHLORIDE 50 MG/ML
12.5 INJECTION INTRAMUSCULAR; INTRAVENOUS
Status: DISCONTINUED | OUTPATIENT
Start: 2023-01-03 | End: 2023-01-03 | Stop reason: HOSPADM

## 2023-01-03 RX ORDER — MORPHINE SULFATE 4 MG/ML
4 INJECTION, SOLUTION INTRAMUSCULAR; INTRAVENOUS
Status: DISCONTINUED | OUTPATIENT
Start: 2023-01-03 | End: 2023-01-03

## 2023-01-03 RX ORDER — ONDANSETRON 4 MG/1
4 TABLET, FILM COATED ORAL EVERY 8 HOURS PRN
COMMUNITY
Start: 2022-12-19

## 2023-01-03 RX ORDER — SODIUM CHLORIDE 0.9 % (FLUSH) 0.9 %
5-40 SYRINGE (ML) INJECTION EVERY 12 HOURS SCHEDULED
Status: DISCONTINUED | OUTPATIENT
Start: 2023-01-03 | End: 2023-01-03 | Stop reason: HOSPADM

## 2023-01-03 RX ORDER — EPHEDRINE SULFATE/0.9% NACL/PF 50 MG/5 ML
SYRINGE (ML) INTRAVENOUS PRN
Status: DISCONTINUED | OUTPATIENT
Start: 2023-01-03 | End: 2023-01-03 | Stop reason: SDUPTHER

## 2023-01-03 RX ORDER — ONDANSETRON 2 MG/ML
4 INJECTION INTRAMUSCULAR; INTRAVENOUS
Status: DISCONTINUED | OUTPATIENT
Start: 2023-01-03 | End: 2023-01-03 | Stop reason: HOSPADM

## 2023-01-03 RX ORDER — AMLODIPINE BESYLATE 5 MG/1
5 TABLET ORAL 2 TIMES DAILY
Qty: 30 TABLET | Refills: 3 | Status: SHIPPED | OUTPATIENT
Start: 2023-01-03 | End: 2023-01-04 | Stop reason: SDUPTHER

## 2023-01-03 RX ORDER — FENTANYL CITRATE 50 UG/ML
INJECTION, SOLUTION INTRAMUSCULAR; INTRAVENOUS PRN
Status: DISCONTINUED | OUTPATIENT
Start: 2023-01-03 | End: 2023-01-03 | Stop reason: SDUPTHER

## 2023-01-03 RX ORDER — SODIUM CHLORIDE 9 MG/ML
INJECTION, SOLUTION INTRAVENOUS PRN
Status: DISCONTINUED | OUTPATIENT
Start: 2023-01-03 | End: 2023-01-03 | Stop reason: HOSPADM

## 2023-01-03 RX ORDER — ONDANSETRON 2 MG/ML
INJECTION INTRAMUSCULAR; INTRAVENOUS PRN
Status: DISCONTINUED | OUTPATIENT
Start: 2023-01-03 | End: 2023-01-03

## 2023-01-03 RX ORDER — OMEPRAZOLE 40 MG/1
40 CAPSULE, DELAYED RELEASE ORAL DAILY
COMMUNITY
Start: 2022-12-30

## 2023-01-03 RX ORDER — ENOXAPARIN SODIUM 100 MG/ML
40 INJECTION SUBCUTANEOUS DAILY
Status: DISCONTINUED | OUTPATIENT
Start: 2023-01-03 | End: 2023-01-05 | Stop reason: HOSPADM

## 2023-01-03 RX ORDER — MAGNESIUM SULFATE 1 G/100ML
1000 INJECTION INTRAVENOUS PRN
Status: DISCONTINUED | OUTPATIENT
Start: 2023-01-03 | End: 2023-01-05 | Stop reason: HOSPADM

## 2023-01-03 RX ORDER — POLYETHYLENE GLYCOL 3350 17 G/17G
17 POWDER, FOR SOLUTION ORAL ONCE
Status: COMPLETED | OUTPATIENT
Start: 2023-01-03 | End: 2023-01-03

## 2023-01-03 RX ORDER — SODIUM CHLORIDE 9 MG/ML
INJECTION, SOLUTION INTRAVENOUS CONTINUOUS
Status: DISCONTINUED | OUTPATIENT
Start: 2023-01-03 | End: 2023-01-03

## 2023-01-03 RX ORDER — HALOPERIDOL 5 MG/ML
1 INJECTION INTRAMUSCULAR
Status: DISCONTINUED | OUTPATIENT
Start: 2023-01-03 | End: 2023-01-03 | Stop reason: HOSPADM

## 2023-01-03 RX ORDER — GLYCOPYRROLATE 0.2 MG/ML
INJECTION INTRAMUSCULAR; INTRAVENOUS PRN
Status: DISCONTINUED | OUTPATIENT
Start: 2023-01-03 | End: 2023-01-03 | Stop reason: SDUPTHER

## 2023-01-03 RX ORDER — ACETAMINOPHEN 325 MG/1
650 TABLET ORAL EVERY 4 HOURS PRN
Status: DISCONTINUED | OUTPATIENT
Start: 2023-01-03 | End: 2023-01-05 | Stop reason: HOSPADM

## 2023-01-03 RX ORDER — SENNA AND DOCUSATE SODIUM 50; 8.6 MG/1; MG/1
1 TABLET, FILM COATED ORAL 2 TIMES DAILY
Status: DISCONTINUED | OUTPATIENT
Start: 2023-01-03 | End: 2023-01-05

## 2023-01-03 RX ORDER — POLYETHYLENE GLYCOL 3350 17 G/17G
17 POWDER, FOR SOLUTION ORAL DAILY PRN
Status: DISCONTINUED | OUTPATIENT
Start: 2023-01-03 | End: 2023-01-05 | Stop reason: HOSPADM

## 2023-01-03 RX ORDER — VALSARTAN 80 MG/1
80 TABLET ORAL DAILY
Status: DISCONTINUED | OUTPATIENT
Start: 2023-01-03 | End: 2023-01-05 | Stop reason: HOSPADM

## 2023-01-03 RX ORDER — SERTRALINE HYDROCHLORIDE 100 MG/1
100 TABLET, FILM COATED ORAL NIGHTLY
Status: DISCONTINUED | OUTPATIENT
Start: 2023-01-03 | End: 2023-01-05 | Stop reason: HOSPADM

## 2023-01-03 RX ORDER — POTASSIUM CHLORIDE 20 MEQ/1
40 TABLET, EXTENDED RELEASE ORAL ONCE
Status: COMPLETED | OUTPATIENT
Start: 2023-01-03 | End: 2023-01-03

## 2023-01-03 RX ORDER — LEVOTHYROXINE SODIUM 0.03 MG/1
25 TABLET ORAL DAILY
Status: DISCONTINUED | OUTPATIENT
Start: 2023-01-04 | End: 2023-01-05 | Stop reason: HOSPADM

## 2023-01-03 RX ORDER — ONDANSETRON 4 MG/1
4 TABLET, ORALLY DISINTEGRATING ORAL EVERY 8 HOURS PRN
Status: DISCONTINUED | OUTPATIENT
Start: 2023-01-03 | End: 2023-01-05 | Stop reason: HOSPADM

## 2023-01-03 RX ORDER — LIDOCAINE HYDROCHLORIDE 20 MG/ML
INJECTION, SOLUTION EPIDURAL; INFILTRATION; INTRACAUDAL; PERINEURAL PRN
Status: DISCONTINUED | OUTPATIENT
Start: 2023-01-03 | End: 2023-01-03 | Stop reason: SDUPTHER

## 2023-01-03 RX ORDER — PANTOPRAZOLE SODIUM 40 MG/1
40 TABLET, DELAYED RELEASE ORAL
Status: DISCONTINUED | OUTPATIENT
Start: 2023-01-03 | End: 2023-01-05 | Stop reason: HOSPADM

## 2023-01-03 RX ORDER — AMLODIPINE BESYLATE 5 MG/1
5 TABLET ORAL 2 TIMES DAILY
Status: DISCONTINUED | OUTPATIENT
Start: 2023-01-03 | End: 2023-01-05 | Stop reason: HOSPADM

## 2023-01-03 RX ORDER — AMLODIPINE BESYLATE 5 MG/1
5 TABLET ORAL DAILY
Status: DISCONTINUED | OUTPATIENT
Start: 2023-01-03 | End: 2023-01-03

## 2023-01-03 RX ORDER — FENTANYL CITRATE 50 UG/ML
50 INJECTION, SOLUTION INTRAMUSCULAR; INTRAVENOUS EVERY 5 MIN PRN
Status: DISCONTINUED | OUTPATIENT
Start: 2023-01-03 | End: 2023-01-03 | Stop reason: HOSPADM

## 2023-01-03 RX ORDER — OXYCODONE HYDROCHLORIDE 10 MG/1
10 TABLET ORAL EVERY 4 HOURS PRN
Status: DISCONTINUED | OUTPATIENT
Start: 2023-01-03 | End: 2023-01-05 | Stop reason: HOSPADM

## 2023-01-03 RX ORDER — POTASSIUM CHLORIDE 7.45 MG/ML
10 INJECTION INTRAVENOUS PRN
Status: DISCONTINUED | OUTPATIENT
Start: 2023-01-03 | End: 2023-01-05 | Stop reason: HOSPADM

## 2023-01-03 RX ORDER — BISACODYL 10 MG
10 SUPPOSITORY, RECTAL RECTAL ONCE
Status: DISCONTINUED | OUTPATIENT
Start: 2023-01-03 | End: 2023-01-05 | Stop reason: HOSPADM

## 2023-01-03 RX ORDER — AMLODIPINE BESYLATE 5 MG/1
5 TABLET ORAL DAILY
Status: ON HOLD | COMMUNITY
End: 2023-01-03 | Stop reason: HOSPADM

## 2023-01-03 RX ORDER — VALSARTAN 80 MG/1
80 TABLET ORAL DAILY
COMMUNITY

## 2023-01-03 RX ORDER — DOCUSATE SODIUM 100 MG/1
100 CAPSULE, LIQUID FILLED ORAL DAILY
Status: DISCONTINUED | OUTPATIENT
Start: 2023-01-04 | End: 2023-01-05 | Stop reason: HOSPADM

## 2023-01-03 RX ORDER — MORPHINE SULFATE 2 MG/ML
2 INJECTION, SOLUTION INTRAMUSCULAR; INTRAVENOUS
Status: DISCONTINUED | OUTPATIENT
Start: 2023-01-03 | End: 2023-01-05 | Stop reason: HOSPADM

## 2023-01-03 RX ORDER — SODIUM CHLORIDE 0.9 % (FLUSH) 0.9 %
5-40 SYRINGE (ML) INJECTION PRN
Status: DISCONTINUED | OUTPATIENT
Start: 2023-01-03 | End: 2023-01-03 | Stop reason: HOSPADM

## 2023-01-03 RX ORDER — LORAZEPAM 2 MG/ML
0.5 INJECTION INTRAMUSCULAR
Status: DISCONTINUED | OUTPATIENT
Start: 2023-01-03 | End: 2023-01-03 | Stop reason: HOSPADM

## 2023-01-03 RX ORDER — ATORVASTATIN CALCIUM 20 MG/1
20 TABLET, FILM COATED ORAL NIGHTLY
Status: DISCONTINUED | OUTPATIENT
Start: 2023-01-03 | End: 2023-01-05 | Stop reason: HOSPADM

## 2023-01-03 RX ORDER — MAGNESIUM HYDROXIDE 1200 MG/15ML
LIQUID ORAL
Status: COMPLETED | OUTPATIENT
Start: 2023-01-03 | End: 2023-01-03

## 2023-01-03 RX ADMIN — AMLODIPINE BESYLATE 5 MG: 5 TABLET ORAL at 12:03

## 2023-01-03 RX ADMIN — ENOXAPARIN SODIUM 40 MG: 100 INJECTION SUBCUTANEOUS at 12:00

## 2023-01-03 RX ADMIN — Medication 160 MG: at 16:04

## 2023-01-03 RX ADMIN — SODIUM CHLORIDE: 9 INJECTION, SOLUTION INTRAVENOUS at 03:57

## 2023-01-03 RX ADMIN — DOCUSATE SODIUM 50 MG AND SENNOSIDES 8.6 MG 1 TABLET: 8.6; 5 TABLET, FILM COATED ORAL at 20:32

## 2023-01-03 RX ADMIN — CARBIDOPA AND LEVODOPA 2.5 TABLET: 25; 100 TABLET ORAL at 12:20

## 2023-01-03 RX ADMIN — Medication 10 MG: at 16:18

## 2023-01-03 RX ADMIN — ATORVASTATIN CALCIUM 20 MG: 20 TABLET, FILM COATED ORAL at 22:04

## 2023-01-03 RX ADMIN — SODIUM CHLORIDE, PRESERVATIVE FREE 10 ML: 5 INJECTION INTRAVENOUS at 12:10

## 2023-01-03 RX ADMIN — LIDOCAINE HYDROCHLORIDE 80 MG: 20 INJECTION, SOLUTION EPIDURAL; INFILTRATION; INTRACAUDAL; PERINEURAL at 16:04

## 2023-01-03 RX ADMIN — PROPOFOL 130 MG: 10 INJECTION, EMULSION INTRAVENOUS at 16:04

## 2023-01-03 RX ADMIN — POLYETHYLENE GLYCOL 3350 17 G: 17 POWDER, FOR SOLUTION ORAL at 12:15

## 2023-01-03 RX ADMIN — VALSARTAN 80 MG: 80 TABLET ORAL at 12:02

## 2023-01-03 RX ADMIN — CEFTRIAXONE 1000 MG: 1 INJECTION, POWDER, FOR SOLUTION INTRAMUSCULAR; INTRAVENOUS at 03:59

## 2023-01-03 RX ADMIN — AMLODIPINE BESYLATE 5 MG: 5 TABLET ORAL at 22:04

## 2023-01-03 RX ADMIN — ONDANSETRON 4 MG: 2 INJECTION INTRAMUSCULAR; INTRAVENOUS at 12:23

## 2023-01-03 RX ADMIN — GLYCOPYRROLATE 0.2 MG: 0.2 INJECTION, SOLUTION INTRAMUSCULAR; INTRAVENOUS at 16:11

## 2023-01-03 RX ADMIN — FENTANYL CITRATE 50 MCG: 50 INJECTION, SOLUTION INTRAMUSCULAR; INTRAVENOUS at 01:23

## 2023-01-03 RX ADMIN — FENTANYL CITRATE 50 MCG: 50 INJECTION INTRAMUSCULAR; INTRAVENOUS at 16:04

## 2023-01-03 RX ADMIN — POTASSIUM CHLORIDE 40 MEQ: 1500 TABLET, EXTENDED RELEASE ORAL at 12:04

## 2023-01-03 RX ADMIN — SODIUM CHLORIDE: 9 INJECTION, SOLUTION INTRAVENOUS at 18:57

## 2023-01-03 RX ADMIN — SODIUM CHLORIDE: 9 INJECTION, SOLUTION INTRAVENOUS at 15:57

## 2023-01-03 RX ADMIN — PANTOPRAZOLE SODIUM 40 MG: 40 TABLET, DELAYED RELEASE ORAL at 12:03

## 2023-01-03 RX ADMIN — CEFAZOLIN SODIUM 1000 MG: 1 INJECTION, POWDER, FOR SOLUTION INTRAMUSCULAR; INTRAVENOUS at 19:00

## 2023-01-03 RX ADMIN — DEXAMETHASONE SODIUM PHOSPHATE 8 MG: 4 INJECTION, SOLUTION INTRAMUSCULAR; INTRAVENOUS at 16:08

## 2023-01-03 RX ADMIN — SERTRALINE 100 MG: 100 TABLET, FILM COATED ORAL at 22:04

## 2023-01-03 ASSESSMENT — PAIN DESCRIPTION - DESCRIPTORS: DESCRIPTORS: ACHING;DULL

## 2023-01-03 ASSESSMENT — PAIN SCALES - GENERAL
PAINLEVEL_OUTOF10: 5
PAINLEVEL_OUTOF10: 0
PAINLEVEL_OUTOF10: 0

## 2023-01-03 ASSESSMENT — PAIN - FUNCTIONAL ASSESSMENT
PAIN_FUNCTIONAL_ASSESSMENT: 0-10
PAIN_FUNCTIONAL_ASSESSMENT: ACTIVITIES ARE NOT PREVENTED

## 2023-01-03 ASSESSMENT — LIFESTYLE VARIABLES: SMOKING_STATUS: 0

## 2023-01-03 NOTE — CARE COORDINATION
INITIAL CASE MANAGEMENT ASSESSMENT     Reviewed chart, met with patient to assess possible discharge needs. Explained Case Management role/services. SW met with patient and  at bedside today. Patient gave this writer permission to speak freely in front of family. Living Situation: Patient resides in a single family home with her  and one cat. Entering from the garage there are two stairs leading up to the living area with rails on both sides. Prior to medical admission she reports that she had no trouble getting in and out of the property. ADLs: Prior to medical admission patient reports that she was independent.  confirms this as true. She stated that she doesn't anticipate any needs at discharge. DME: Prior to medical admission patient reports that she had a walker and was already set up with oxygen from [employer]. We will continue to monitor for needs until she is ready for discharge. PT/OT: Not Ordered. Active Services: Prior to medical admission patient reports that she had no active services in place. She stated that she doesn't anticipate any needs at discharge. Transportation: Prior to medical admission patient was an active .  confirms that he will likely transport her home at discharge. Medications: Patient receives long term medications from [facility] and starting this month she has o use [facility]. At this time there are no issues with access or affordability. PCP: Armaan Warren [phone] (phone) and [phone] (fax number). Her last appointment with this provider was over one week ago. PLAN/COMMENTS:   1) Urology clearance and pain management. 2) Discharge to home with family. SW provided contact information for patient or family to call with any questions. SW will follow and assist as needed. Respectfully submitted,     Shanell L. Media Hamman MSW, BLAINES  [employer]   [phone] Electronically signed by NELIA King on 1/3/2023 at 11:55 AM

## 2023-01-03 NOTE — ED NOTES
Patient up  To bathroom used strainer no stone present. Resting comfortable in bed.      Montserrat Cronin RN  01/03/23 8760

## 2023-01-03 NOTE — ED NOTES
Pt started to feel nauseous at proceeded to vomit at this time. Patient gown changed, new dressings applied. All other patient needs met at this time.       Eliane Vincent RN  01/03/23 6997

## 2023-01-03 NOTE — PROGRESS NOTES
Pt awake and pulling at non rebreather mask. Removed and switched to NC, on 4L at this time, sating 93%.

## 2023-01-03 NOTE — CONSULTS
Consulting Physician: Dr. John Maravilla    Reason for Consult: obstruct kidney stones    History of Present Illness: Claudette Power is a 65 y.o. female who came to the hospital for 1 week of abdominal pain, poor appetite. Denies any urinary changes, always has urinary urgency. CT with a 6.5mm left UVJ stone with hydro. UA reflex to culture not indicated. Creatinine 0.8. Denies prior history of kidney stones.      Past Medical History:   Past Medical History:   Diagnosis Date    Anxiety     Balance disorder     unknown etiology    GERD (gastroesophageal reflux disease)     History of kidney stones     Hx of blood clots     x 1 from use of BCPs    Hyperlipidemia     Hypertension     Infection 11/2015    gastric band    Liver cyst     \"possible\"    Olfactory nerve injury     mostly resolved    Right ear injury 20 yrs ago    Thyroid disease        Past Surgical History:  Past Surgical History:   Procedure Laterality Date    ABDOMINOPLASTY  2012    also cosmetic surgery on [facility] 2    exc. cyst lumbar    CHOLECYSTECTOMY      COLONOSCOPY      COLONOSCOPY N/A 8/19/2019    COLONOSCOPY DIAGNOSTIC performed by Prem Aguilar MD at [facility]      breast reduction    ENDOSCOPY, COLON, DIAGNOSTIC      EYE SURGERY      cataracts    [address]    also breast reduction    FACIAL COSMETIC SURGERY  [id number]     FACELIFT, BROWLIFT, UPPER AND LOWER BLEPHEROPLASTY AND    GASTRIC BAND  2011    also port removal subsequently (3/2015)    HYSTERECTOMY (CERVIX STATUS UNKNOWN)  1989    OTHER SURGICAL HISTORY  11/24/2015    LAPAROSCOPIC GASTRIC BAND REMOVAL         OVARIAN CYST SURGERY  1987    UTERINE FIBROID SURGERY  1974       Social History:  Social History     Socioeconomic History    Marital status:      Spouse name: Not on file    Number of children: Not on file    Years of education: Not on file    Highest education level: Not on file   Occupational History    Not on file   Tobacco Use    Smoking status: Never    Smokeless tobacco: Never   Vaping Use    Vaping Use: Not on file   Substance and Sexual Activity    Alcohol use: No    Drug use: No    Sexual activity: Not on file   Other Topics Concern    Not on file   Social History Narrative    Not on file     Social Determinants of Health     Financial Resource Strain: Not on file   Food Insecurity: Not on file   Transportation Needs: Not on file   Physical Activity: Not on file   Stress: Not on file   Social Connections: Not on file   Intimate Partner Violence: Not on file   Housing Stability: Not on file       Family History:  History reviewed. No pertinent family history.     Meds:   Current Facility-Administered Medications: sodium chloride flush 0.9 % injection 5-40 mL, 5-40 mL, IntraVENous, 2 times per day  sodium chloride flush 0.9 % injection 5-40 mL, 5-40 mL, IntraVENous, PRN  0.9 % sodium chloride infusion, , IntraVENous, PRN  enoxaparin (LOVENOX) injection 40 mg, 40 mg, SubCUTAneous, Daily  ondansetron (ZOFRAN-ODT) disintegrating tablet 4 mg, 4 mg, Oral, Q8H PRN **OR** ondansetron (ZOFRAN) injection 4 mg, 4 mg, IntraVENous, Q6H PRN  polyethylene glycol (GLYCOLAX) packet 17 g, 17 g, Oral, Daily PRN  morphine (PF) injection 2 mg, 2 mg, IntraVENous, Q2H PRN **OR** morphine (PF) injection 4 mg, 4 mg, IntraVENous, Q2H PRN  oxyCODONE (ROXICODONE) immediate release tablet 5 mg, 5 mg, Oral, PRN **OR** oxyCODONE HCl (OXY-IR) immediate release tablet 10 mg, 10 mg, Oral, PRN  potassium chloride (KLOR-CON M) extended release tablet 40 mEq, 40 mEq, Oral, PRN **OR** potassium bicarb-citric acid (EFFER-K) effervescent tablet 40 mEq, 40 mEq, Oral, PRN **OR** potassium chloride 10 mEq/100 mL IVPB (Peripheral Line), 10 mEq, IntraVENous, PRN  magnesium sulfate 1000 mg in dextrose 5% 100 mL IVPB, 1,000 mg, IntraVENous, PRN  sodium phosphate 10.62 mmol in sodium chloride 0.9 % 250 mL IVPB, 0.16 mmol/kg, IntraVENous, PRN **OR** sodium phosphate 21.24 mmol in sodium chloride 0.9 % 250 mL IVPB, 0.32 mmol/kg, IntraVENous, PRN  0.9% NaCl with KCl 40 mEq infusion, , IntraVENous, Continuous  potassium chloride (KLOR-CON M) extended release tablet 40 mEq, 40 mEq, Oral, Once  polyethylene glycol (GLYCOLAX) packet 17 g, 17 g, Oral, Once  bisacodyl (DULCOLAX) suppository 10 mg, 10 mg, Rectal, Once  [START ON 1/4/2023] docusate sodium (COLACE) capsule 100 mg, 100 mg, Oral, Daily  amLODIPine (NORVASC) tablet 5 mg, 5 mg, Oral, Daily  carbidopa-levodopa (SINEMET)  MG per tablet 2.5 tablet, 2.5 tablet, Oral, TID  pantoprazole (PROTONIX) tablet 40 mg, 40 mg, Oral, QAM AC  valsartan (DIOVAN) tablet 80 mg, 80 mg, Oral, Daily    Vitals:  BP (!) 141/64   Pulse 54   Temp 98.3 °F (36.8 °C) (Oral)   Resp 14   Wt 146 lb 6.2 oz (66.4 kg)   SpO2 99%   BMI 25.13 kg/m²     Intake/Output Summary (Last 24 hours) at 1/3/2023 1201  Last data filed at 1/2/2023 2337  Gross per 24 hour   Intake 1000 ml   Output --   Net 1000 ml       Review of Systems:  10 Systems were reviewed and negative except as in HPI      Physical Exam:  General Appearance: Alert and oriented, cooperative, no distress, appears stated age  Head: Normocephalic, without obvious abnormality, atraumatic  Back: no CVA tenderness  Lungs: respirations unlabored, no wheezing  Heart: Regular rate and rhythm, no lower extremity edema noted  Abdomen: Soft, RLQ-tender, non-distended, no masses  Skin: Skin color, texture, turgor normal, no rashes or lesions  Neurologic: no gross deficits  Female :   Bladder is non tender  No CVA tenderness  Spontaneously voiding  Pelvic Exam Not Indicated    Labs:  CBC   Lab Results   Component Value Date/Time    WBC 11.1 01/02/2023 10:36 PM    RBC 4.32 01/02/2023 10:36 PM    HGB 13.4 01/02/2023 10:36 PM    HCT 41.0 01/02/2023 10:36 PM    MCV 94.9 01/02/2023 10:36 PM    MCH 31.0 01/02/2023 10:36 PM    MCHC 32.7 01/02/2023 10:36 PM    RDW 12.1 01/02/2023 10:36 PM     01/02/2023 10:36 PM    MPV 10.1 01/02/2023 10:36 PM     BMP   Lab Results   Component Value Date/Time     01/03/2023 08:42 AM    K 3.5 01/03/2023 08:42 AM    K 3.1 01/02/2023 10:36 PM     01/03/2023 08:42 AM    CO2 27 01/03/2023 08:42 AM    BUN 12 01/03/2023 08:42 AM    CREATININE 0.8 01/03/2023 08:42 AM    GLUCOSE 92 01/03/2023 08:42 AM    CALCIUM 9.5 01/03/2023 08:42 AM       Urinalysis:   Lab Results   Component Value Date/Time    COLORU DARK YELLOW 01/02/2023 10:36 PM    GLUCOSEU Negative 01/02/2023 10:36 PM    BLOODU LARGE 01/02/2023 10:36 PM    NITRU Negative 01/02/2023 10:36 PM    LEUKOCYTESUR TRACE 01/02/2023 10:36 PM       Imaging: Pertinent images and radiologist's report were reviewed independently  CT abdomen/pelvis 1/2/23  Impression   6.5 mm stone lodged in the ureteral vesicle junction on the left causing mild   obstruction. Bilateral renal cysts which are unchanged a tiny left renal stone which is   less prominent       Moderate constipation which is more prominent with no obstruction. Status post hysterectomy with no pelvic mass or active inflammation. Previous cholecystectomy and chronic liver changes with small cysts scattered   in the liver and tiny hypodensities inferiorly in the right lobe which   probably also represent cysts but are too small to further characterize and   appear unchanged. Previous cholecystectomy and prominence of the common duct which is unchanged       Subsegmental atelectasis vs recurrent infiltrates or residual scarring   scattered along the right lung base which is less prominent. Impression/Plan:   - 77y. o. female with 6.5mm left UVJ stone with hydro. - Cystoscopy with left ureteral stent insertion today with Dr. Rigoberto Abbott. Procedure, benefits and risks discussed with patient and she agreed to proceed. Ureteroscopy to be performed in 1-2 weeks as outpatient. Our office will contact her to arrange this. - Keep NPO.     George Orcarlos eduardo SARA Sanchez - CNP 1/3/009420:01 PM

## 2023-01-03 NOTE — ANESTHESIA POSTPROCEDURE EVALUATION
Department of Anesthesiology  Postprocedure Note    Patient: Kalee Singer  MRN: [id number]  YOB: 1945  Date of evaluation: 1/3/2023      Procedure Summary     Date: 01/03/23 Room / Location: [address] [place] [address]    Anesthesia Start: 2225 Anesthesia Stop: 1656    Procedure: CYSTOSCOPY LEFT STENT PLACEMENT (Left: Ureter) Diagnosis:       Renal colic on left side      (RENAL COLIC ON LEFT SIDE)    Surgeons: Keiry Galeano MD Responsible Provider: Violeta Horn MD    Anesthesia Type: general ASA Status: 3          Anesthesia Type: No value filed.     Inocencio Phase I: Inocencio Score: 4    Inocencio Phase II:        Anesthesia Post Evaluation    Patient location during evaluation: bedside  Patient participation: complete - patient participated  Level of consciousness: awake and alert  Pain score: 0  Nausea & Vomiting: no nausea  Complications: no  Cardiovascular status: hemodynamically stable  Respiratory status: acceptable  Hydration status: stable

## 2023-01-03 NOTE — PROGRESS NOTES
Pt updated on plan of care and room assignment, still denies pain at this time. Report called to gary potter. Pt taken upstairs via stretcher, no signs of distress noted at time of transfer to floor.

## 2023-01-03 NOTE — ED PROVIDER NOTES
[address] [facility]      Pt Name: Taras Quiroz  MRN: [id number]  [place] 1945  Date of evaluation: 1/2/2023  Provider: Florentino Le DO    CHIEF COMPLAINT  Chief Complaint   Patient presents with    Abdominal Pain     X3 days, worsened today; states she is very nauseous, no diarrhea; pain radiates thru midsection ; denies trouble urinating; states she feels weak         This patient is at risk for a communicable infection. Therefore, personal protection equipment consisting of a mask was worn for the exam.    HPI  Taras Quiroz is a 65 y.o. female who presents with abdominal pain that she describes across the middle of her abdomen. She has had a history of previous symptoms but \"not as bad. \"  She never had a diagnosis. She states has been constipated for 2 weeks. She has history of cholecystectomy. She denies any history of hysterectomy, C-sections, appendectomy or herniorrhaphies. She denies any fevers or chills. She denies any dysuria nocturia hematuria. She states she has had a bowel movement for 2 weeks. She denies any new medications or medication changes. She denies any medications over-the-counter that are new. She describes her pain as sharp and severe. She is very nauseated. She denies any vomiting. She denies any diarrhea. She denies any blood in her stools or blood in her urine. Nothing makes it better or worse. She describes it as severe. She is feeling very weak. REVIEW OF SYSTEMS  All systems negative except as noted in the HPI. Reviewed Nurses' notes and concur. No LMP recorded. Patient has had a hysterectomy.     PAST MEDICAL HISTORY  Past Medical History:   Diagnosis Date    Anxiety     Balance disorder     unknown etiology    GERD (gastroesophageal reflux disease)     History of kidney stones     Hx of blood clots     x 1 from use of BCPs    Hyperlipidemia     Hypertension     Infection 11/2015 gastric band    Liver cyst     \"possible\"    Olfactory nerve injury     mostly resolved    Right ear injury 20 yrs ago    Thyroid disease        FAMILY HISTORY  History reviewed. No pertinent family history. SOCIAL HISTORY   reports that she has never smoked. She has never used smokeless tobacco. She reports that she does not drink alcohol and does not use drugs. SURGICAL HISTORY  Past Surgical History:   Procedure Laterality Date    ABDOMINOPLASTY  2012    also cosmetic surgery on arms    BACK SURGERY  1998    exc. cyst lumbar    CHOLECYSTECTOMY      COLONOSCOPY      COLONOSCOPY N/A 8/19/2019    COLONOSCOPY DIAGNOSTIC performed by Dannie Grace MD at [facility]      breast reduction    ENDOSCOPY, COLON, DIAGNOSTIC      EYE SURGERY      cataracts    FACIAL COSMETIC SURGERY  1986    also breast reduction    FACIAL COSMETIC SURGERY  [id number]     FACELIFT, BROWLIFT, UPPER AND LOWER BLEPHEROPLASTY AND    GASTRIC BAND  2011    also port removal subsequently (3/2015)    HYSTERECTOMY (CERVIX STATUS UNKNOWN)  1989    OTHER SURGICAL HISTORY  11/24/2015    LAPAROSCOPIC GASTRIC BAND REMOVAL         OVARIAN CYST SURGERY  1987    UTERINE FIBROID SURGERY  1974       CURRENT MEDICATIONS  Current Outpatient Rx   Medication Sig Dispense Refill    acetaminophen (TYLENOL) 500 MG tablet Take 1 tablet by mouth every 6 hours as needed for Pain 30 tablet 0    metFORMIN (GLUCOPHAGE) 500 MG tablet Take 500 mg by mouth in the morning and 500 mg in the evening. Take with meals.       aspirin 81 MG chewable tablet Take 81 mg by mouth daily      estradiol (ESTRACE) 0.1 MG/GM vaginal cream Place 2 g vaginally daily      Multiple Vitamins-Minerals (THERAPEUTIC MULTIVITAMIN-MINERALS) tablet Take 1 tablet by mouth daily      lovastatin (MEVACOR) 40 MG tablet       levothyroxine (SYNTHROID) 50 MCG tablet 25 mcg      valsartan-hydrochlorothiazide (DIOVAN-HCT) 160-25 MG per tablet Take 1 tablet by mouth daily ibuprofen (ADVIL;MOTRIN) 200 MG tablet Take 200 mg by mouth every 6 hours as needed for Pain      docusate sodium (COLACE) 100 MG capsule Take 100 mg by mouth nightly. (Patient not taking: Reported on 7/21/2022)      sertraline (ZOLOFT) 100 MG tablet Take 100 mg by mouth nightly. ALLERGIES  Allergies   Allergen Reactions    Demerol      hallucinations    Erythromycin Rash    Pcn [Penicillins] Rash         PHYSICAL EXAM  VITAL SIGNS: BP (!) 171/75   Pulse 70   Temp 98.3 °F (36.8 °C) (Oral)   Resp 16   Wt 146 lb 6.2 oz (66.4 kg)   SpO2 94%   BMI 25.13 kg/m²    Constitutional: Well-developed, well-nourished, appears normal, nontoxic, activity: Sitting in the chair in [place], appears mildly uncomfortable, does not appear toxic. HEENT: Normocephalic, Atraumatic, Bilateral ears are normal, Oropharynx moist, No oral exudates, Nose normal.  Eyes: PERRLA, EOMI, Conjunctiva normal, No discharge. No scleral icterus. Neck: Normal range of motion, No tenderness, Supple,  Lymphatic: No lymphadenopathy noted. Cardiovascular: Normal heart rate, Normal rhythm, no murmurs, no gallops, no rubs. Thorax & Lungs: Normal breath sounds, No respiratory distress, No wheezing,  Abdomen: Soft, mild periumbilical tender with no guarding, no rebound, no rigidity; no distension, no masses, no pulsatile masses, no hepatosplenomegaly, normal bowel sounds. Skin: Warm, Dry, No erythema, No rash. Back: No tenderness, Full range of motion, No scoliosis. Extremities: No edema, No tenderness, No cyanosis, No clubbing. No amputations, capillary refill less than 2 seconds. Musculoskeletal: Good range of motion in all major joints, No tenderness to palpation or major deformities noted. Neurologic: Alert & oriented x 3  Psychiatric: Affect normal, Judgment normal, Mood normal.    COURSE & MEDICAL DECISION MAKING  Pertinent Labs & Imaging studies reviewed.  (See chart for details)    LABORATORY  Labs Reviewed   CBC WITH AUTO DIFFERENTIAL   COMPREHENSIVE METABOLIC PANEL W/ REFLEX TO MG FOR LOW K   LACTIC ACID   URINALYSIS WITH REFLEX TO CULTURE     RADIOLOGY/PROCEDURES  I personally reviewed the images for this case. CT ABDOMEN PELVIS W IV CONTRAST Additional Contrast? None    (Results Pending)        Vitals:    01/02/23 2130   BP: (!) 171/75   Pulse: 70   Resp: 16   Temp: 98.3 °F (36.8 °C)   TempSrc: Oral   SpO2: 94%   Weight: 146 lb 6.2 oz (66.4 kg)       Medications   0.9 % sodium chloride bolus (has no administration in time range)       New Prescriptions    No medications on file       SEP-1 CORE MEASURE DATA  Exclusion criteria: the patient is NOT to be included for sepsis due to:  SIRS criteria are not met    Patient remained stable in the ED. her laboratory work revealed a potassium of 3.1. Her white count was elevated 11.1. Urinalysis showed blood without any evidence of infection. CT scan of her abdomen showed a 6.5 mm stone at the left UVJ with moderate obstruction. Remainder of work-up was unremarkable. Patient improved with Bentyl and ondansetron. She is still having pain and requesting pain medication. She was given fentanyl 50 mcg IV. She had been given Bentyl 20 mg IM with IV fluids. She was also given oral potassium 40 mill equivalents p.o. Patient is admitted to the hospital for evaluation by urology. Hospitalist was notified at [phone]. Diagnostic considerations include but are not limited to:   Abdominal Aortic Aneurysm, Acute Coronary Syndrome, Ischemic Bowel, Bowel Obstruction (including Gastric Outlet Obstruction), cyclical vomiting syndrome, PUD, GERD, Gastritis, Acute Cholecystitis, appendicitis, pancreatitis, Hepatitis, Colitis, SMA Syndrome, Mesenteric Steal Syndrome, Splanchnic Vein Thrombosis, cyclical vomiting syndrome, hypokalemia, ureterolithiasis, other    CT abdomen- CT abdomen was ordered to rule out abdominal aortic aneurysm, ischemic bowel, bowel obstruction, intestinal masses, intra-abdominal masses, ureteral obstruction, bladder outlet obstruction, bowel perforation. CBC-CBC was ordered to rule out anemia, infection, abnormal platelet count, polycythemia, abnormal Red cell pathology, or any other pathology that might be causing the patient's symptoms    CMP-CMP was ordered to rule out electrolyte abnormalities, liver dysfunction, kidney dysfunction, electrolyte imbalance, abnormal transaminases, or any other pathology that might be causing the patient's symptoms. Urine-urinalysis was ordered to rule out infection, renal failure, dehydration, pregnancy, proteinuria, bilirubinuria, or any other pathology that might be causing the patient's symptoms    The patient's blood pressure was found to be elevated according to CMS/Medicare and the Affordable Care Act/[facility] criteria. Elevated blood pressure could occur because of pain or anxiety or other reasons and does not mean that they need to have their blood pressure treated or medications otherwise adjusted. However, this could also be a sign that they will need to have their blood pressure treated or medications changed. The patient was instructed to follow up closely with their personal physician to have their blood pressure rechecked. The patient was instructed to take a list of recent blood pressure readings to their next visit with their personal physician. I reviewed old records     (This chart has been completed using [address]. Although attempts have been made to ensure accuracy, words and/or phrases may not be transcribed as intended.)    Patient refused pain medicines at the time of her exam.    IMPRESSION(S):  No diagnosis found.     ?  Recheck Times: 0030    Abdominal Aortic Aneurysm, Acute Coronary Syndrome, Ischemic Bowel, Bowel Obstruction (including Gastric Outlet Obstruction), cyclical vomiting syndrome, PUD, GERD, Acute Cholecystitis, Pancreatitis, Hepatitis, Colitis, SMA Syndrome, Mesenteric Steal Syndrome, Splanchnic Vein Thrombosis, cyclical vomiting syndrome, other       Cindra [place],   01/03/23 3692

## 2023-01-03 NOTE — PROGRESS NOTES
Pt to pacu from OR. Pt asleep at arrival, on 10L via nonrebreather per CRNA student. Pt placed on monitor. Report obtained. No signs of distress noted at this time.

## 2023-01-03 NOTE — PROGRESS NOTES
Pt resting comfortably in bed, will not open eyes and talk to writer, but will answer yes and no and grunt when asked questions. Preop and Or nurses states was preop baseline, states patient was very lethargic and resting with eyes closed, gave minimal answers to staff. Pt still denies pain at this time. VSS.

## 2023-01-03 NOTE — OP NOTE
Operative Note      Patient: Shanta Lucas  YOB: 1945  MRN: [id number]    Date of Procedure: 1/3/2023    Pre-Op Diagnosis: Left distal ureteral calculus    Post-Op Diagnosis: Same       Left ureteroscopy, basket extraction and left ureteral stent placement    Surgeon(s):  Diana Garcia MD    Assistant:   Surgical Assistant: Petey Gonsalez    Anesthesia: General    Estimated Blood Loss (mL): Minimal    Complications: None    Specimens:   ID Type Source Tests Collected by Time Destination   A : A. Stones for analysis Tissue Tissue SURGICAL PATHOLOGY Diana Garcia MD 1/3/2023 1632        Implants:  Implant Name Type Inv. Item Serial No.  Lot No. LRB No. Used Action   STENT URET 6FR L24CM PERCFLX HYDR+ SFT PGTL TAPR TIP W/O - [id number]  STENT URET 6FR L24CM PERCFLX HYDR+ SFT PGTL TAPR TIP W/O  [facility] [id number] Left 1 Implanted         Drains: * No LDAs found *    Findings: Left distal ureteral stone    Indications: 65year-old patient who presented with nausea vomiting and left-sided flank pain due to an obstructing stone in the left distal ureter. Detailed Description of Procedure:   After appropriate general anesthesia routine prepped and draped in a dorsolithotomy position cystoscopic examination of the bladder urothelium was normal.  The bladder was trabeculated. Bilateral ureteral openings were in a normal location. The urethra was unremarkable. The left ureteral orifice was cannulated with a guidewire that was successfully manipulated past the stone and up into the left renal pelvis. A second guidewire was placed to guide the scope the distal ureter since she had a significant cystocele. The stone was visualized engaged with a 0 tip basket and removed from the ureter intact. The stone was sent for analysis. Using cystoscopic and fluoroscopic guidance a 6 [place] 24 cm left double-J ureteral stent was placed over the guidewire.     She was transferred to the recovery area having tolerated the procedure well and will follow-up for stent removal in approximately 7 to 10 days.     Electronically signed by Lorrie Rivas MD on 1/3/2023 at 4:39 PM

## 2023-01-03 NOTE — ANESTHESIA PRE PROCEDURE
Department of Anesthesiology  Preprocedure Note       Name:  Margaux Arreguin   Age:  65 y.o.  :  1945                                          MRN:  [id number]         Date:  1/3/2023      Surgeon: Mica Saeed):  Deanna Brown MD    Procedure: Procedure(s):  CYSTOSCOPY LEFT STENT PLACEMENT    Medications prior to admission:   Prior to Admission medications    Medication Sig Start Date End Date Taking? Authorizing Provider   valsartan (DIOVAN) 80 MG tablet Take 80 mg by mouth daily   Yes Historical Provider, MD   amLODIPine (NORVASC) 5 MG tablet Take 5 mg by mouth daily   Yes Historical Provider, MD   carbidopa-levodopa (SINEMET)  MG per tablet Take 2.5 tablets by mouth 3 times daily 22  Yes Historical Provider, MD   omeprazole (PRILOSEC) 40 MG delayed release capsule Take 40 mg by mouth daily 22   Historical Provider, MD   ondansetron (ZOFRAN) 4 MG tablet Take 4 mg by mouth every 8 hours as needed 22   Historical Provider, MD   aspirin 81 MG chewable tablet Take 81 mg by mouth daily    Historical Provider, MD   estradiol (ESTRACE) 0.1 MG/GM vaginal cream Place 0.5 g vaginally Twice a Week    Historical Provider, MD   lovastatin (MEVACOR) 40 MG tablet Take 40 mg by mouth daily 16   Historical Provider, MD   levothyroxine (SYNTHROID) 25 MCG tablet Take 25 mcg by mouth Daily 16   Historical Provider, MD   ibuprofen (ADVIL;MOTRIN) 200 MG tablet Take 200 mg by mouth every 6 hours as needed for Pain    Historical Provider, MD   sertraline (ZOLOFT) 100 MG tablet Take 100 mg by mouth nightly.       Historical Provider, MD       Current medications:    Current Facility-Administered Medications   Medication Dose Route Frequency Provider Last Rate Last Admin    sodium chloride flush 0.9 % injection 5-40 mL  5-40 mL IntraVENous 2 times per day Edmond Lopez MD   10 mL at 23 1210    sodium chloride flush 0.9 % injection 5-40 mL  5-40 mL IntraVENous PRN Edmond Lopez MD        0.9 % sodium chloride infusion   IntraVENous PRN Chase Clifford MD        enoxaparin (LOVENOX) injection 40 mg  40 mg SubCUTAneous Daily Chase Clifford MD   40 mg at 01/03/23 1200    ondansetron (ZOFRAN-ODT) disintegrating tablet 4 mg  4 mg Oral Q8H PRN Chase Clifford MD        Or    ondansetron [facility]) injection 4 mg  4 mg IntraVENous Q6H PRN Chase Clifford MD   4 mg at 01/03/23 1223    polyethylene glycol (GLYCOLAX) packet 17 g  17 g Oral Daily PRN Chase Clifford MD        morphine (PF) injection 2 mg  2 mg IntraVENous Q2H PRN Chase Clifford MD        Or   Doris Wood morphine (PF) injection 4 mg  4 mg IntraVENous Q2H PRN Chase Clifford MD        oxyCODONE (ROXICODONE) immediate release tablet 5 mg  5 mg Oral PRN Chase Clifford MD        Or   Doris Wood oxyCODONE HCl (OXY-IR) immediate release tablet 10 mg  10 mg Oral PRN Chase Clifford MD        potassium chloride (KLOR-CON M) extended release tablet 40 mEq  40 mEq Oral PRN Lulú Gao MD        Or    potassium bicarb-citric acid (EFFER-K) effervescent tablet 40 mEq  40 mEq Oral PRN Lulú Gao MD        Or    potassium chloride 10 mEq/100 mL IVPB (Peripheral Line)  10 mEq IntraVENous PRN Lulú Gao MD        magnesium sulfate 1000 mg in dextrose 5% 100 mL IVPB  1,000 mg IntraVENous PRN Lulú Gao MD        sodium phosphate 10.62 mmol in sodium chloride 0.9 % 250 mL IVPB  0.16 mmol/kg IntraVENous PRN Lulú Gao MD        Or    sodium phosphate 21.24 mmol in sodium chloride 0.9 % 250 mL IVPB  0.32 mmol/kg IntraVENous PRN Lulú Gao MD        0.9% NaCl with KCl 40 mEq infusion   IntraVENous Continuous Lulú Gao MD        bisacodyl (DULCOLAX) suppository 10 mg  10 mg Rectal Once MD Doris Page ON 1/4/2023] docusate sodium (COLACE) capsule 100 mg  100 mg Oral Daily Lulú Gao MD        amLODIPine (NORVASC) tablet 5 mg  5 mg Oral Daily Lulú Gao MD   5 mg at 01/03/23 1203    carbidopa-levodopa (SINEMET)  MG per tablet 2.5 tablet  2.5 tablet Oral TID Nagi Stephen MD   2.5 tablet at 01/03/23 1220    pantoprazole (PROTONIX) tablet 40 mg  40 mg Oral QAM AC Simona Aponte MD   40 mg at 01/03/23 1203    valsartan (DIOVAN) tablet 80 mg  80 mg Oral Daily Nagi Stephen MD   80 mg at 01/03/23 1202       Allergies:     Allergies   Allergen Reactions    Demerol      hallucinations    Erythromycin Rash    Pcn [Penicillins] Rash       Problem List:    Patient Active Problem List   Diagnosis Code    Polyp, stomach K31.7    Cholecystitis with cholelithiasis K80.10    Gastric band malfunction K95.09    Hand injury, right, initial encounter S69.91XA    Poor historian Z78.9    Hypoxemia R09.02    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    Hypersomnia G47.10    Echocardiogram abnormal R93.1    JACK (obstructive sleep apnea) G47.33    Urinary tract obstruction by kidney stone N20.0, N13.8       Past Medical History:        Diagnosis Date    Anxiety     Balance disorder     unknown etiology    GERD (gastroesophageal reflux disease)     History of kidney stones     Hx of blood clots     x 1 from use of BCPs    Hyperlipidemia     Hypertension     Infection 11/2015    gastric band    Liver cyst     \"possible\"    Olfactory nerve injury     mostly resolved    Right ear injury 20 yrs ago    Thyroid disease        Past Surgical History:        Procedure Laterality Date    ABDOMINOPLASTY  2012    also cosmetic surgery on arms    BACK SURGERY  1998    exc. cyst lumbar    CHOLECYSTECTOMY      COLONOSCOPY      COLONOSCOPY N/A 8/19/2019    COLONOSCOPY DIAGNOSTIC performed by Katerina Horn MD at [address] [place] [address]      breast reduction    ENDOSCOPY, COLON, DIAGNOSTIC      EYE SURGERY      cataracts    FACIAL COSMETIC SURGERY  1986    also breast reduction    FACIAL COSMETIC SURGERY  [id number]     FACELIFT, BROWLIFT, UPPER AND LOWER BLEPHEROPLASTY AND    GASTRIC BAND  2011    also port removal subsequently (3/2015)    HYSTERECTOMY (CERVIX STATUS UNKNOWN)  1989    OTHER SURGICAL HISTORY  11/24/2015    LAPAROSCOPIC GASTRIC BAND REMOVAL         OVARIAN CYST SURGERY  1987    UTERINE FIBROID SURGERY  1974       Social History:    Social History     Tobacco Use    Smoking status: Never    Smokeless tobacco: Never   Substance Use Topics    Alcohol use: No                                Counseling given: Not Answered      Vital Signs (Current):   Vitals:    01/03/23 0515 01/03/23 0800 01/03/23 1230 01/03/23 1435   BP: 139/62 (!) 141/64 (!) 155/77 (!) 170/74   Pulse: 51 54  66   Resp: 16 14  16   Temp:    96.9 °F (36.1 °C)   TempSrc:    Temporal   SpO2: 99% 99% 95% 93%   Weight:    146 lb (66.2 kg)   Height:    5' 5\" (1.651 m)                                              BP Readings from Last 3 Encounters:   01/03/23 (!) 170/74   07/29/22 (!) 147/82   07/21/22 115/80       NPO Status: Time of last liquid consumption: 1330                        Time of last solid consumption: 1700                        Date of last liquid consumption: 01/03/23                        Date of last solid food consumption: 01/01/23    BMI:   Wt Readings from Last 3 Encounters:   01/03/23 146 lb (66.2 kg)   07/29/22 157 lb 13.6 oz (71.6 kg)   07/21/22 146 lb 9.6 oz (66.5 kg)     Body mass index is 24.3 kg/m².     CBC:   Lab Results   Component Value Date/Time    WBC 11.1 01/02/2023 10:36 PM    RBC 4.32 01/02/2023 10:36 PM    HGB 13.4 01/02/2023 10:36 PM    HCT 41.0 01/02/2023 10:36 PM    MCV 94.9 01/02/2023 10:36 PM    RDW 12.1 01/02/2023 10:36 PM     01/02/2023 10:36 PM       CMP:   Lab Results   Component Value Date/Time     01/03/2023 08:42 AM    K 3.5 01/03/2023 08:42 AM    K 3.1 01/02/2023 10:36 PM     01/03/2023 08:42 AM    CO2 27 01/03/2023 08:42 AM    BUN 12 01/03/2023 08:42 AM    CREATININE 0.8 01/03/2023 08:42 AM    GFRAA >60 07/29/2022 05:19 PM    GFRAA >60 08/15/2012 05:45 AM    AGRATIO 1.8 01/02/2023 10:36 PM    LABGLOM >60 01/03/2023 08:42 AM    GLUCOSE 92 01/03/2023 08:42 AM    PROT 6.8 01/02/2023 10:36 PM    PROT 6.9 05/23/2012 01:00 PM    CALCIUM 9.5 01/03/2023 08:42 AM    BILITOT 0.4 01/02/2023 10:36 PM    ALKPHOS 111 01/02/2023 10:36 PM    AST 13 01/02/2023 10:36 PM    ALT <5 01/02/2023 10:36 PM       POC Tests:   Recent Labs     01/03/23  1410   POCGLU 96       Coags:   Lab Results   Component Value Date/Time    PROTIME 12.1 11/13/2015 10:00 AM    INR 1.06 11/13/2015 10:00 AM    APTT 31.4 11/13/2015 10:00 AM       HCG (If Applicable): No results found for: PREGTESTUR, PREGSERUM, HCG, HCGQUANT     ABGs: No results found for: PHART, PO2ART, YDH4RCW, XUK1JUS, BEART, A2TVCGZH     Type & Screen (If Applicable):  Lab Results   Component Value Date    LABABO A 08/03/2012    [address] Positive 08/03/2012       Drug/Infectious Status (If Applicable):  No results found for: HIV, HEPCAB    COVID-19 Screening (If Applicable): No results found for: COVID19        Anesthesia Evaluation  Patient summary reviewed no history of anesthetic complications:   Airway: Mallampati: II  TM distance: >3 FB   Neck ROM: full  Mouth opening: > = 3 FB   Dental: normal exam         Pulmonary:       (-) sleep apnea and not a current smoker                           Cardiovascular:    (+) hypertension:, hyperlipidemia    (-) past MI, CABG/stent and  angina                Neuro/Psych:   (+) neuromuscular disease: Parkinson's disease,             GI/Hepatic/Renal:   (+) GERD:,           Endo/Other:        (-) diabetes mellitus, hypothyroidism, hyperthyroidism               Abdominal:             Vascular: negative vascular ROS. Other Findings: Appears ill, vomit bag on lap. Anesthesia Plan      general     ASA 3     (RSI)  Induction: intravenous. MIPS: Postoperative opioids intended and Prophylactic antiemetics administered. Anesthetic plan and risks discussed with patient.       Plan discussed with CRNA.                  This pre-anesthesia assessment may be used as a history and physical.    DOS STAFF ADDENDUM:    Pt seen and examined, chart reviewed (including anesthesia, drug and allergy history). No interval changes to history and physical examination. Anesthetic plan, risks, benefits, alternatives, and personnel involved discussed with patient. Patient verbalized an understanding and agrees to proceed.       Narda Gasca MD  January 3, 2023  3:10 PM

## 2023-01-03 NOTE — PROGRESS NOTES
Pt arrived to room from PACU. VSS, A+Ox4 but very tired. Pt oriented to room and call light system. Used bathroom and is resting. No other needs at this time.

## 2023-01-03 NOTE — H&P
Update History & Physical    The patient's History and Physical was reviewed and the patient examined - no changes The surgical site was confirmed by the patient and me. Left UVJ stone - left stent placement - possible ureteroscopy      Plan: The risks, benefits, expected outcome, and alternative to the recommended procedure have been discussed with the patient. Patient understands and wants to proceed with the procedure.       Electronically signed by Katharina Morse MD on 1/3/2023 at 12:45 PM

## 2023-01-03 NOTE — ED NOTES
Patient transported at this time by surgery team to pre op area. No acute distress noted.       Martie Kawasaki, RN  01/03/23 3991

## 2023-01-03 NOTE — ED NOTES
Patient up to bathroom with assist. Repositioned and back in bed resting comfortable.      Montserrat Cronin RN  01/03/23 7590

## 2023-01-03 NOTE — ACP (ADVANCE CARE PLANNING)
Advance Care Planning     Advance Care Planning Activator (Inpatient)  Conversation Note      Date of ACP Conversation: 1/3/2023     Conversation Conducted with: Patient with Decision Making Capacity    ACP Activator: NELIA Garcia    Health Care Decision Maker:     Current Designated Health Care Decision Maker:     Primary Decision Maker: [facility] [phone]    Care Preferences    Ventilation: \"If you were in your present state of health and suddenly became very ill and were unable to breathe on your own, what would your preference be about the use of a ventilator (breathing machine) if it were available to you? \"      Would the patient desire the use of ventilator (breathing machine)?: yes    \"If your health worsens and it becomes clear that your chance of recovery is unlikely, what would your preference be about the use of a ventilator (breathing machine) if it were available to you? \"     Would the patient desire the use of ventilator (breathing machine)?: No      Resuscitation  \"CPR works best to restart the heart when there is a sudden event, like a heart attack, in someone who is otherwise healthy. Unfortunately, CPR does not typically restart the heart for people who have serious health conditions or who are very sick. \"    \"In the event your heart stopped as a result of an underlying serious health condition, would you want attempts to be made to restart your heart (answer \"yes\" for attempt to resuscitate) or would you prefer a natural death (answer \"no\" for do not attempt to resuscitate)? \" yes       [] Yes   [] No   Educated Patient / Dinesh Hawkins regarding differences between Advance Directives and portable DNR orders.     Length of ACP Conversation in minutes:  2    Conversation Outcomes:  [x] ACP discussion completed  [] Existing advance directive reviewed with patient; no changes to patient's previously recorded wishes  [] New Advance Directive completed  [] Portable Do Not Rescitate prepared for Provider review and signature  [] POLST/POST/MOLST/MOST prepared for Provider review and signature      Follow-up plan:    [] Schedule follow-up conversation to continue planning  [] Referred individual to Provider for additional questions/concerns   [] Advised patient/agent/surrogate to review completed ACP document and update if needed with changes in condition, patient preferences or care setting    [x] This note routed to one or more involved healthcare providers Mal Hunt primary care physician. Respectfully submitted,    NICO Carroll-S  [facility]   [phone]    Electronically signed by NELIA Lezama on 1/3/2023 at 12:05 PM

## 2023-01-03 NOTE — PROGRESS NOTES
Pharmacy Medication Reconciliation Note     List of medications patient is currently taking is complete. Source of information:   1. List of medications provided by patient's spouse  2. Epic records    Notes regarding home medications:   1. Medication list updated based on patient's list and epic records.     Lisa Live PharmD  1/3/2023 3:06 AM

## 2023-01-03 NOTE — PROGRESS NOTES
[facility] Medicine Progress Note     Date:  1/3/2023    PCP: Parker Blackmon (Tel: [phone])    Date of Admission: 1/2/2023    Chief complaint:   Chief Complaint   Patient presents with    Abdominal Pain     X3 days, worsened today; states she is very nauseous, no diarrhea; pain radiates thru midsection ; denies trouble urinating; states she feels weak       Brief admission history: 76-year-old female with history of anxiety disorder, GERD, history of kidney stones, thrombosis, essential hypertension, who presents to the emergency room with complaints of abdominal pain, diagnosed with left UVJ stone and constipation. Assessment/plan:   Ureterolithiasis. Left UVJ stone measuring 6 mm. Continue multimodal pain regimen. Urology has been consulted. Urinalysis not consistent with infection; was previously on Rocephin, discontinued on 1/3/2023. Constipation. Continue bowel regimen as ordered. Hypokalemia, potassium 3.1. Potassium replacement ordered. Diet: Diet NPO    Code status: Full Code   ----------  Subjective  No pain at this time. Objective  Physical exam:  Vitals: /62   Pulse 51   Temp 98.3 °F (36.8 °C) (Oral)   Resp 16   Wt 146 lb 6.2 oz (66.4 kg)   SpO2 99%   BMI 25.13 kg/m²   Gen/overall appearance: Not in acute distress. Alert. Oriented x3. Head: Normocephalic, atraumatic  Eyes: EOMI, good acuity  ENT: Oral mucosa moist  Neck: No JVD, thyromegaly  CVS: Nml S1S2, no MRG, RRR  Pulm: Clear bilaterally. No crackles/wheezes  Gastrointestinal: Soft, NT/ND, +BS  Musculoskeletal: No edema. Warm  Neuro: No focal deficit. Moves extremity spontaneously. Psychiatry: Appropriate affect. Not agitated. Skin: Warm, dry with normal turgor.  No rash  Capillary refill: Brisk,< 3 seconds   Peripheral Pulses: +2 palpable, equal bilaterally      24HR INTAKE/OUTPUT:    Intake/Output Summary (Last 24 hours) at 1/3/2023 0819  Last data filed at 1/2/2023 2337  Gross per 24 hour   Intake 1000 ml   Output --   Net 1000 ml     I/O last 3 completed shifts: In: 1000 [IV Piggyback:1000]  Out: -   No intake/output data recorded.   Meds:    sodium chloride flush  5-40 mL IntraVENous 2 times per day    enoxaparin  40 mg SubCUTAneous Daily    potassium chloride  40 mEq Oral Once    polyethylene glycol  17 g Oral Once    bisacodyl  10 mg Rectal Once    [START ON 1/4/2023] docusate sodium  100 mg Oral Daily    amLODIPine  5 mg Oral Daily    carbidopa-levodopa  2.5 tablet Oral TID    pantoprazole  40 mg Oral QAM AC    valsartan  80 mg Oral Daily     Infusions:    sodium chloride 150 mL/hr at 01/03/23 0357    sodium chloride       PRN Meds: sodium chloride flush, sodium chloride, ondansetron **OR** ondansetron, polyethylene glycol, morphine **OR** morphine, oxyCODONE **OR** oxyCODONE, potassium chloride **OR** potassium alternative oral replacement **OR** potassium chloride, magnesium sulfate, sodium phosphate IVPB **OR** sodium phosphate IVPB    Labs/imaging:  CBC:   Recent Labs     01/02/23 2236   WBC 11.1*   HGB 13.4        BMP:    Recent Labs     01/02/23 2236      K 3.1*   CL 98*   CO2 27   BUN 14   CREATININE 1.0   GLUCOSE 130*     Hepatic:   Recent Labs     01/02/23 2236   AST 13*   ALT <5*   BILITOT 0.4   ALKPHOS 111       Abdon Harper MD  -------------------------------  [facility] hospitalist

## 2023-01-04 ENCOUNTER — APPOINTMENT (OUTPATIENT)
Dept: GENERAL RADIOLOGY | Age: 78
DRG: 660 | End: 2023-01-04
Payer: MEDICARE

## 2023-01-04 LAB
ALBUMIN SERPL-MCNC: 3.4 G/DL (ref 3.4–5)
ANION GAP SERPL CALCULATED.3IONS-SCNC: 12 MMOL/L (ref 3–16)
BASOPHILS ABSOLUTE: 0 K/UL (ref 0–0.2)
BASOPHILS RELATIVE PERCENT: 0.2 %
BUN BLDV-MCNC: 15 MG/DL (ref 7–20)
CALCIUM SERPL-MCNC: 9.7 MG/DL (ref 8.3–10.6)
CHLORIDE BLD-SCNC: 104 MMOL/L (ref 99–110)
CO2: 26 MMOL/L (ref 21–32)
CREAT SERPL-MCNC: 0.7 MG/DL (ref 0.6–1.2)
EOSINOPHILS ABSOLUTE: 0 K/UL (ref 0–0.6)
EOSINOPHILS RELATIVE PERCENT: 0 %
GFR SERPL CREATININE-BSD FRML MDRD: >60 ML/MIN/{1.73_M2}
GLUCOSE BLD-MCNC: 103 MG/DL (ref 70–99)
HCT VFR BLD CALC: 33.8 % (ref 36–48)
HEMOGLOBIN: 11.6 G/DL (ref 12–16)
LYMPHOCYTES ABSOLUTE: 0.7 K/UL (ref 1–5.1)
LYMPHOCYTES RELATIVE PERCENT: 11.1 %
MAGNESIUM: 1.8 MG/DL (ref 1.8–2.4)
MCH RBC QN AUTO: 32.4 PG (ref 26–34)
MCHC RBC AUTO-ENTMCNC: 34.4 G/DL (ref 31–36)
MCV RBC AUTO: 94.2 FL (ref 80–100)
MONOCYTES ABSOLUTE: 0.5 K/UL (ref 0–1.3)
MONOCYTES RELATIVE PERCENT: 7.5 %
NEUTROPHILS ABSOLUTE: 5.1 K/UL (ref 1.7–7.7)
NEUTROPHILS RELATIVE PERCENT: 81.2 %
PDW BLD-RTO: 12 % (ref 12.4–15.4)
PHOSPHORUS: 3.7 MG/DL (ref 2.5–4.9)
PLATELET # BLD: 185 K/UL (ref 135–450)
PMV BLD AUTO: 10.1 FL (ref 5–10.5)
POTASSIUM SERPL-SCNC: 3.3 MMOL/L (ref 3.5–5.1)
RBC # BLD: 3.59 M/UL (ref 4–5.2)
SODIUM BLD-SCNC: 142 MMOL/L (ref 136–145)
WBC # BLD: 6.3 K/UL (ref 4–11)

## 2023-01-04 PROCEDURE — 1200000000 HC SEMI PRIVATE

## 2023-01-04 PROCEDURE — 83735 ASSAY OF MAGNESIUM: CPT

## 2023-01-04 PROCEDURE — 6360000002 HC RX W HCPCS: Performed by: UROLOGY

## 2023-01-04 PROCEDURE — 94761 N-INVAS EAR/PLS OXIMETRY MLT: CPT

## 2023-01-04 PROCEDURE — 6360000002 HC RX W HCPCS: Performed by: INTERNAL MEDICINE

## 2023-01-04 PROCEDURE — 2580000003 HC RX 258: Performed by: UROLOGY

## 2023-01-04 PROCEDURE — 71045 X-RAY EXAM CHEST 1 VIEW: CPT

## 2023-01-04 PROCEDURE — 80069 RENAL FUNCTION PANEL: CPT

## 2023-01-04 PROCEDURE — 85025 COMPLETE CBC W/AUTO DIFF WBC: CPT

## 2023-01-04 PROCEDURE — 2700000000 HC OXYGEN THERAPY PER DAY

## 2023-01-04 PROCEDURE — 6370000000 HC RX 637 (ALT 250 FOR IP): Performed by: UROLOGY

## 2023-01-04 PROCEDURE — 6370000000 HC RX 637 (ALT 250 FOR IP): Performed by: INTERNAL MEDICINE

## 2023-01-04 PROCEDURE — 36415 COLL VENOUS BLD VENIPUNCTURE: CPT

## 2023-01-04 RX ORDER — POTASSIUM CHLORIDE 20 MEQ/1
40 TABLET, EXTENDED RELEASE ORAL ONCE
Status: COMPLETED | OUTPATIENT
Start: 2023-01-04 | End: 2023-01-04

## 2023-01-04 RX ORDER — AMLODIPINE BESYLATE 5 MG/1
5 TABLET ORAL DAILY
Qty: 30 TABLET | Refills: 3
Start: 2023-01-04

## 2023-01-04 RX ORDER — LACTULOSE 10 G/15ML
20 SOLUTION ORAL 3 TIMES DAILY
Status: DISCONTINUED | OUTPATIENT
Start: 2023-01-04 | End: 2023-01-05

## 2023-01-04 RX ORDER — LACTULOSE 10 G/15ML
20 SOLUTION ORAL ONCE
Status: DISCONTINUED | OUTPATIENT
Start: 2023-01-04 | End: 2023-01-04

## 2023-01-04 RX ORDER — POLYETHYLENE GLYCOL 3350 17 G/17G
17 POWDER, FOR SOLUTION ORAL 2 TIMES DAILY
Status: DISCONTINUED | OUTPATIENT
Start: 2023-01-04 | End: 2023-01-05

## 2023-01-04 RX ADMIN — ATORVASTATIN CALCIUM 20 MG: 20 TABLET, FILM COATED ORAL at 20:38

## 2023-01-04 RX ADMIN — DOCUSATE SODIUM 100 MG: 100 CAPSULE, LIQUID FILLED ORAL at 08:41

## 2023-01-04 RX ADMIN — CARBIDOPA AND LEVODOPA 2.5 TABLET: 25; 100 TABLET ORAL at 20:37

## 2023-01-04 RX ADMIN — CARBIDOPA AND LEVODOPA 2.5 TABLET: 25; 100 TABLET ORAL at 15:35

## 2023-01-04 RX ADMIN — ONDANSETRON 4 MG: 2 INJECTION INTRAMUSCULAR; INTRAVENOUS at 15:18

## 2023-01-04 RX ADMIN — ENOXAPARIN SODIUM 40 MG: 100 INJECTION SUBCUTANEOUS at 08:41

## 2023-01-04 RX ADMIN — AMLODIPINE BESYLATE 5 MG: 5 TABLET ORAL at 08:41

## 2023-01-04 RX ADMIN — Medication 10 ML: at 20:39

## 2023-01-04 RX ADMIN — PANTOPRAZOLE SODIUM 40 MG: 40 TABLET, DELAYED RELEASE ORAL at 06:00

## 2023-01-04 RX ADMIN — POTASSIUM CHLORIDE 20 MEQ: 1500 TABLET, EXTENDED RELEASE ORAL at 15:34

## 2023-01-04 RX ADMIN — DOCUSATE SODIUM 50 MG AND SENNOSIDES 8.6 MG 1 TABLET: 8.6; 5 TABLET, FILM COATED ORAL at 08:40

## 2023-01-04 RX ADMIN — VALSARTAN 80 MG: 80 TABLET ORAL at 08:41

## 2023-01-04 RX ADMIN — LACTULOSE 20 G: 20 SOLUTION ORAL at 20:39

## 2023-01-04 RX ADMIN — CEFAZOLIN SODIUM 1000 MG: 1 INJECTION, POWDER, FOR SOLUTION INTRAMUSCULAR; INTRAVENOUS at 01:49

## 2023-01-04 RX ADMIN — POTASSIUM CHLORIDE 40 MEQ: 1500 TABLET, EXTENDED RELEASE ORAL at 11:14

## 2023-01-04 RX ADMIN — LACTULOSE 20 G: 20 SOLUTION ORAL at 12:43

## 2023-01-04 RX ADMIN — LEVOTHYROXINE SODIUM 25 MCG: 0.03 TABLET ORAL at 06:00

## 2023-01-04 RX ADMIN — POLYETHYLENE GLYCOL 3350 17 G: 17 POWDER, FOR SOLUTION ORAL at 20:38

## 2023-01-04 RX ADMIN — AMLODIPINE BESYLATE 5 MG: 5 TABLET ORAL at 20:38

## 2023-01-04 RX ADMIN — POLYETHYLENE GLYCOL 3350 17 G: 17 POWDER, FOR SOLUTION ORAL at 12:44

## 2023-01-04 RX ADMIN — SERTRALINE 100 MG: 100 TABLET, FILM COATED ORAL at 20:37

## 2023-01-04 RX ADMIN — CARBIDOPA AND LEVODOPA 2.5 TABLET: 25; 100 TABLET ORAL at 08:40

## 2023-01-04 RX ADMIN — DOCUSATE SODIUM 50 MG AND SENNOSIDES 8.6 MG 1 TABLET: 8.6; 5 TABLET, FILM COATED ORAL at 20:38

## 2023-01-04 ASSESSMENT — PAIN SCALES - GENERAL
PAINLEVEL_OUTOF10: 0

## 2023-01-04 NOTE — PLAN OF CARE
Problem: Discharge Planning  Goal: Discharge to home or other facility with appropriate resources  Outcome: Progressing  Flowsheets (Taken 1/3/2023 2200)  Discharge to home or other facility with appropriate resources:   Identify barriers to discharge with patient and caregiver   Arrange for needed discharge resources and transportation as appropriate   Identify discharge learning needs (meds, wound care, etc)     Problem: Pain  Goal: Verbalizes/displays adequate comfort level or baseline comfort level  Outcome: Progressing     Problem: ABCDS Injury Assessment  Goal: Absence of physical injury  Outcome: Progressing

## 2023-01-04 NOTE — DISCHARGE INSTRUCTIONS
You are being discharged with a ureteral stent on the left. This is a temporary device to help keep the kidney draining. This must be removed eventually. Our office will contact you for stent removal. If you do not hear from them by Friday 1/6/23, please call [phone]. It is common to experience some discomfort with a stent. Common symptoms include urinary urgency, frequency, burning, blood in urine and pain with urination. The prescribed medications should help these symptoms. You have restrictions, if certain activities make your symptoms worse try to avoid them. You should limit to non strenous activity. This website has videos/ information about the ureteral stent and what to expect.     [email or web address]

## 2023-01-04 NOTE — PLAN OF CARE
Problem: Discharge Planning  Goal: Discharge to home or other facility with appropriate resources  1/4/2023 1154 by Genoveva Cyr RN  Outcome: Progressing  1/4/2023 0649 by Criss Perez RN  Outcome: Progressing  Flowsheets (Taken 1/3/2023 2200)  Discharge to home or other facility with appropriate resources:   Identify barriers to discharge with patient and caregiver   Arrange for needed discharge resources and transportation as appropriate   Identify discharge learning needs (meds, wound care, etc)     Problem: Pain  Goal: Verbalizes/displays adequate comfort level or baseline comfort level  1/4/2023 1154 by Genoveva Cyr RN  Outcome: Progressing  1/4/2023 0649 by Criss Perez RN  Outcome: Progressing     Problem: ABCDS Injury Assessment  Goal: Absence of physical injury  1/4/2023 1154 by Genoveva Cyr RN  Outcome: Progressing  1/4/2023 0649 by Criss Perez RN  Outcome: Progressing

## 2023-01-04 NOTE — PROGRESS NOTES
Pt has been drowsy, delayed responses throughout the shift. Pulled IV out x1 and almost pulled another out when attempting to get out of bed without assistance . Did void several times . Urine lianne streaked with red . Has offered no complaints. O2 remains on at 5L. In bed with bed alarm on.

## 2023-01-04 NOTE — PROGRESS NOTES
4 Eyes Skin Assessment     NAME:  Eugenio Mcneil OF BIRTH:  1945  MEDICAL RECORD NUMBER:  [id number]    The patient is being assessed for  Admission    I agree that One RN have performed a thorough Head to Toe Skin Assessment on the patient. ALL assessment sites listed below have been assessed. Areas assessed by both nurses:    Head, Face, Ears, Shoulders, Back, Chest, Arms, Elbows, Hands, Sacrum. Buttock, Coccyx, Ischium, and Legs. Feet and Heels        Does the Patient have a Wound?  No noted wound(s)       Sorin Prevention initiated by RN: No   Wound Care Orders initiated by RN: No    Pressure Injury (Stage 3,4, Unstageable, DTI, NWPT, and Complex wounds) if present place referral order by RN under : No    New and Established Ostomies, if present place, referral order under : No      Nurse 1 eSignature: Electronically signed by Renzo Rangel RN on 1/3/23 at 7:08 PM EST    **SHARE this note so that the co-signing nurse is able to place an eSignature**    Nurse 2 eSignature: Electronically signed by Merilyn Koyanagi, RN on 1/3/23 at 7:09 PM EST

## 2023-01-04 NOTE — PROGRESS NOTES
[facility] Medicine Progress Note     Date:  1/4/2023    PCP: Juan Carlos Omalley (Tel: [phone])    Date of Admission: 1/2/2023    Chief complaint:   Chief Complaint   Patient presents with    Abdominal Pain     X3 days, worsened today; states she is very nauseous, no diarrhea; pain radiates thru midsection ; denies trouble urinating; states she feels weak       Brief admission history: 65-year-old female with history of Parkinson's disease, anxiety disorder, GERD, history of kidney stones, thrombosis, essential hypertension, chronic respiratory failure with hypoxia (on 2 lpm supplemental oxygen at night time), who presents to the emergency room with complaints of abdominal pain, diagnosed with left UVJ stone and constipation. Assessment/plan:   Ureterolithiasis. Left UVJ stone measuring 6 mm. Continue multimodal pain regimen. Urology has been consulted. Urinalysis not consistent with infection; was previously on Rocephin, discontinued on 1/3/2023. Essential hypertension. SBP usually in the 120s to 140s at home. Due to history of Parkinson's disease, likely has labile BP. She has had some good and some elevated BP readings. Slight adjustment has been made to home medications. Will avoid aggressive changes to BP meds to prevent low BP readings. Constipation. Has not had bowel movement in about a week. Continue bowel regimen, including recently added lactulose. Hypokalemia, potassium 3.1. Potassium replacement ordered. Chronic respiratory failure with hypoxia. Uses 2 lpm supplemental oxygen during sleep. Other comobidities: history of Parkinson's disease, anxiety disorder, GERD, history of kidney stones, thrombosis, essential hypertension, chronic respiratory failure with hypoxia (on 2 lpm supplemental oxygen at night time). Diet: ADULT DIET; Regular    Code status: Full Code   ----------  Subjective  Constipation. No other issues. No pain today.     Objective  Physical exam:  Vitals: BP (!) 160/58   Pulse 65   Temp 98 °F (36.7 °C) (Oral)   Resp 21   Ht 5' 5\" (1.651 m)   Wt 152 lb 1.9 oz (69 kg)   SpO2 98%   BMI 25.31 kg/m²   Gen/overall appearance: Not in acute distress. Alert. Oriented x3. Head: Normocephalic, atraumatic  Eyes: EOMI, good acuity  ENT: Oral mucosa moist  Neck: No JVD, thyromegaly  CVS: Nml S1S2, no MRG, RRR  Pulm: Clear bilaterally. No crackles/wheezes  Gastrointestinal: Soft, NT/ND, +BS  Musculoskeletal: No edema. Warm  Neuro: No focal deficit. Moves extremity spontaneously. Psychiatry: Appropriate affect. Not agitated. Skin: Warm, dry with normal turgor. No rash  Capillary refill: Brisk,< 3 seconds   Peripheral Pulses: +2 palpable, equal bilaterally      24HR INTAKE/OUTPUT:    Intake/Output Summary (Last 24 hours) at 1/4/2023 1130  Last data filed at 1/3/2023 1743  Gross per 24 hour   Intake 550 ml   Output 10 ml   Net 540 ml       I/O last 3 completed shifts: In: 3879 [I.V.:550; IV Piggyback:1000]  Out: 10 [Blood:10]  No intake/output data recorded.   Meds:    potassium chloride  40 mEq Oral Once    polyethylene glycol  17 g Oral BID    lactulose  20 g Oral TID    sodium chloride flush  5-40 mL IntraVENous 2 times per day    enoxaparin  40 mg SubCUTAneous Daily    bisacodyl  10 mg Rectal Once    docusate sodium  100 mg Oral Daily    carbidopa-levodopa  2.5 tablet Oral TID    pantoprazole  40 mg Oral QAM AC    valsartan  80 mg Oral Daily    sodium chloride flush  5-40 mL IntraVENous 2 times per day    sennosides-docusate sodium  1 tablet Oral BID    amLODIPine  5 mg Oral BID    levothyroxine  25 mcg Oral Daily    atorvastatin  20 mg Oral Nightly    sertraline  100 mg Oral Nightly     Infusions:    sodium chloride      sodium chloride       PRN Meds: sodium chloride flush, sodium chloride, polyethylene glycol, potassium chloride **OR** potassium alternative oral replacement **OR** potassium chloride, magnesium sulfate, sodium phosphate IVPB **OR** sodium phosphate IVPB, sodium chloride flush, sodium chloride, ondansetron **OR** ondansetron, acetaminophen, oxyCODONE **OR** oxyCODONE, morphine **OR** morphine    Labs/imaging:  CBC:   Recent Labs     01/02/23 2236 01/04/23  0538   WBC 11.1* 6.3   HGB 13.4 11.6*    185       BMP:    Recent Labs     01/02/23 2236 01/03/23  0842 01/04/23 0538    142 142   K 3.1* 3.5 3.3*   CL 98* 106 104   CO2 27 27 26   BUN 14 12 15   CREATININE 1.0 0.8 0.7   GLUCOSE 130* 92 103*       Hepatic:   Recent Labs     01/02/23 2236   AST 13*   ALT <5*   BILITOT 0.4   ALKPHOS 111         Susy Ceballos MD  -------------------------------  [facility] hospitalist

## 2023-01-04 NOTE — PROGRESS NOTES
Pt Name: Reche Pert Record Number: [id number]  Date of Birth 1945   Today's Date: 1/4/2023      Subjective:  Awake in bed, feeling much better than yesterday. Is having frequency to void which is expected with the stent. ROS: Constitutional: No fever    Vitals:  Vitals:    01/04/23 0816 01/04/23 1124 01/04/23 1159 01/04/23 1238   BP: (!) 175/70 (!) 160/58     Pulse: 65  62    Resp:       Temp: 98 °F (36.7 °C)  98.1 °F (36.7 °C)    TempSrc: Oral  Oral    SpO2: 98%  93% 96%   Weight:       Height:         I/O last 3 completed shifts:   In: 1550 [I.V.:550; IV Piggyback:1000]  Out: 10 [Blood:10]    Exam:  General: Awake, oriented, no acute distress  Respiratory: Nonlabored breathing  Abdomen: Soft, non-tender, non-distended, no masses  : spontansouly voiding  Skin: Skin color, texture, turgor normal, no rashes or lesions  Neurologic: no gross deficits    CURRENT MEDICATIONS   Scheduled Meds:   potassium chloride  40 mEq Oral Once    polyethylene glycol  17 g Oral BID    lactulose  20 g Oral TID    sodium chloride flush  5-40 mL IntraVENous 2 times per day    enoxaparin  40 mg SubCUTAneous Daily    bisacodyl  10 mg Rectal Once    docusate sodium  100 mg Oral Daily    carbidopa-levodopa  2.5 tablet Oral TID    pantoprazole  40 mg Oral QAM AC    valsartan  80 mg Oral Daily    sodium chloride flush  5-40 mL IntraVENous 2 times per day    sennosides-docusate sodium  1 tablet Oral BID    amLODIPine  5 mg Oral BID    levothyroxine  25 mcg Oral Daily    atorvastatin  20 mg Oral Nightly    sertraline  100 mg Oral Nightly     Continuous Infusions:   sodium chloride      sodium chloride       PRN Meds:.sodium chloride flush, sodium chloride, polyethylene glycol, potassium chloride **OR** potassium alternative oral replacement **OR** potassium chloride, magnesium sulfate, sodium phosphate IVPB **OR** sodium phosphate IVPB, sodium chloride flush, sodium chloride, ondansetron **OR** ondansetron, acetaminophen, oxyCODONE **OR** oxyCODONE, morphine **OR** morphine    LABS     Recent Labs     01/02/23  2236 01/03/23  0842 01/04/23  0538   WBC 11.1*  --  6.3   HGB 13.4  --  11.6*   HCT 41.0  --  33.8*     --  185    142 142   K 3.1* 3.5 3.3*   CL 98* 106 104   CO2 27 27 26   BUN 14 12 15   CREATININE 1.0 0.8 0.7   MG 1.80 1.90 1.80   PHOS  --  3.0 3.7   CALCIUM 10.2 9.5 9.7   AST 13*  --   --    ALT <5*  --   --    BILITOT 0.4  --   --        [address] day # 1  77y. o. female with 6.5mm left UVJ stone with hydro. S/p left ureteroscopy with ureteral stent placement with Dr. Eliane Palacios, stone was sent for analysis. PLAN   Follow up in 7-10 days for ureteral stent removal. Our office will contact her to arrange this.       Morales Stokes, SARA - CNP 1/4/2023

## 2023-01-04 NOTE — H&P
Hospital Medicine History and Physical    1/3/2023    Date of Admission: 1/3/2023    Date of Service: Pt seen/examined on 1/3/2023 and admitted to inpatient. Assessment/plan:   Ureterolithiasis. Left UVJ stone measuring 6 mm. Continue multimodal pain regimen. Urology has been consulted. Urinalysis not consistent with infection; was previously on Rocephin, discontinued on 1/3/2023. Constipation. Continue bowel regimen as ordered. Hypokalemia, potassium 3.1. Potassium replacement ordered. Other comorbidities: history of anxiety disorder, GERD, history of kidney stones, thrombosis, essential hypertension. Activities: Up with assist  Prophylaxis: SC lovenox   Code status: Full    ==========================================================  Chief complaint:  Chief Complaint   Patient presents with    Abdominal Pain     X3 days, worsened today; states she is very nauseous, no diarrhea; pain radiates thru midsection ; denies trouble urinating; states she feels weak         History of Presenting Illness: This is a pleasant 54-year-old female with history of anxiety disorder, GERD, history of kidney stones, thrombosis, essential hypertension, who presents to the emergency room with complaints of abdominal pain, diagnosed with left UVJ stone and constipation.     Past Medical History:      Diagnosis Date    Anxiety     Balance disorder     unknown etiology    GERD (gastroesophageal reflux disease)     History of kidney stones     Hx of blood clots     x 1 from use of BCPs    Hyperlipidemia     Hypertension     Infection 11/2015    gastric band    Liver cyst     \"possible\"    Olfactory nerve injury     mostly resolved    Right ear injury 20 yrs ago    Thyroid disease        Past Surgical History:      Procedure Laterality Date    ABDOMINOPLASTY  2012    also cosmetic surgery on [facility] 2    exc. cyst lumbar    CHOLECYSTECTOMY      COLONOSCOPY      COLONOSCOPY N/A 8/19/2019    COLONOSCOPY DIAGNOSTIC performed by Morena Romero MD at [facility]      breast reduction    ENDOSCOPY, COLON, DIAGNOSTIC      EYE SURGERY      cataracts    FACIAL COSMETIC SURGERY  1986    also breast reduction    FACIAL COSMETIC SURGERY  [id number]     FACELIFT, BROWLIFT, UPPER AND LOWER BLEPHEROPLASTY AND    GASTRIC BAND  2011    also port removal subsequently (3/2015)    HYSTERECTOMY (CERVIX STATUS UNKNOWN)  1989    OTHER SURGICAL HISTORY  11/24/2015    LAPAROSCOPIC GASTRIC BAND REMOVAL         OVARIAN CYST SURGERY  1987    UTERINE FIBROID SURGERY  1974       Medications (prior to admission):  Prior to Admission medications    Medication Sig Start Date End Date Taking? Authorizing Provider   valsartan (DIOVAN) 80 MG tablet Take 80 mg by mouth daily   Yes Historical Provider, MD   amLODIPine (NORVASC) 5 MG tablet Take 5 mg by mouth daily   Yes Historical Provider, MD   carbidopa-levodopa (SINEMET)  MG per tablet Take 2.5 tablets by mouth 3 times daily 7/29/22  Yes Historical Provider, MD   omeprazole (PRILOSEC) 40 MG delayed release capsule Take 40 mg by mouth daily 12/30/22   Historical Provider, MD   ondansetron (ZOFRAN) 4 MG tablet Take 4 mg by mouth every 8 hours as needed 12/19/22   Historical Provider, MD   aspirin 81 MG chewable tablet Take 81 mg by mouth daily    Historical Provider, MD   estradiol (ESTRACE) 0.1 MG/GM vaginal cream Place 0.5 g vaginally Twice a Week    Historical Provider, MD   lovastatin (MEVACOR) 40 MG tablet Take 40 mg by mouth daily 9/4/16   Historical Provider, MD   levothyroxine (SYNTHROID) 25 MCG tablet Take 25 mcg by mouth Daily 8/18/16   Historical Provider, MD   ibuprofen (ADVIL;MOTRIN) 200 MG tablet Take 200 mg by mouth every 6 hours as needed for Pain    Historical Provider, MD   sertraline (ZOLOFT) 100 MG tablet Take 100 mg by mouth nightly.       Historical Provider, MD       Allergy(ies):  Demerol, Erythromycin, and Pcn [penicillins]    Social History:  TOBACCO:  reports that she has never smoked. She has never used smokeless tobacco.  ETOH:  reports no history of alcohol use. Family History:  History reviewed. No pertinent family history. Review of Systems:  Pertinent positives are listed in HPI. At least 10-point ROS reviewed and were negative. Vitals and physical examination:  BP (!) 172/82   Pulse 86   Temp 98.7 °F (37.1 °C) (Oral)   Resp 16   Ht 5' 5\" (1.651 m)   Wt 146 lb (66.2 kg)   SpO2 91%   BMI 24.30 kg/m²   Gen/overall appearance: Not in acute distress. Alert. Head: Normocephalic, atraumatic  Eyes: EOMI, good acuity  ENT: Oral mucosa moist  Neck: No JVD, thyromegaly  CVS: Nml S1S2, no MRG, RRR  Pulm: Clear bilaterally. No crackles/wheezes  Gastrointestinal: Soft, NT/ND, +BS  Musculoskeletal: No edema. Warm  Neuro: No focal deficit. Moves extremity spontaneously. Psychiatry: Appropriate affect. Not agitated. Skin: Warm, dry with normal turgor. No rash  Capillary refill: Brisk,< 3 seconds   Peripheral Pulses: +2 palpable, equal bilaterally      Labs/imaging/EKG:  CBC:   Recent Labs     01/02/23 2236   WBC 11.1*   HGB 13.4        BMP:    Recent Labs     01/02/23 2236 01/03/23  0842    142   K 3.1* 3.5   CL 98* 106   CO2 27 27   BUN 14 12   CREATININE 1.0 0.8   GLUCOSE 130* 92     Hepatic:   Recent Labs     01/02/23 2236   AST 13*   ALT <5*   BILITOT 0.4   ALKPHOS 111       XR ABDOMEN (KUB) (SINGLE AP VIEW)    Result Date: 1/3/2023  Radiology exam is complete. No Radiologist dictation. Please follow up with ordering provider. CT ABDOMEN PELVIS W IV CONTRAST Additional Contrast? None    Result Date: 1/3/2023  EXAMINATION: CT OF THE ABDOMEN AND PELVIS WITH CONTRAST 1/2/2023 11:26 pm TECHNIQUE: CT of the abdomen and pelvis was performed with the administration of intravenous contrast. Multiplanar reformatted images are provided for review.  Automated exposure control, iterative reconstruction, and/or weight based adjustment of the mA/kV was utilized to reduce the radiation dose to as low as reasonably achievable. COMPARISON: 01/25/2022 HISTORY: ORDERING SYSTEM PROVIDED HISTORY: diffuse abdominal pain, hx of cholecystectomy, no hx of appy, hyst. or c-sect TECHNOLOGIST PROVIDED HISTORY: Additional Contrast?->None Reason for exam:->diffuse abdominal pain, hx of cholecystectomy, no hx of appy, hyst. or c-sect Decision Support Exception - unselect if not a suspected or confirmed emergency medical condition->Emergency Medical Condition (MA) Reason for Exam: diffuse abdominal pain, hx of cholecystectomy, no hx of appy, hyst. or c-sect FINDINGS: Lower Chest:   There is asymmetric elevation right hemidiaphragm with subsegmental opacities along the diaphragm extending anteriorly and posteriorly which is slightly less prominent.  No endobronchial lesion is seen.  The left lung is clear. Organs: There is mild hypertrophy of the caudate and left lobes of the liver with fatty replacement throughout.  There are multiple hypodensities scattered throughout right lobe with the largest superiorly measuring 3.6 cm which measures water density.  There are several other smaller hypodensities which do not enhance.  The gallbladder has been removed with mild prominence of the common duct which tapers distally and is unchanged.  The pancreas and spleen are unremarkable.  The adrenals are normal.  The kidneys are normal size with perirenal stranding around both kidneys which is more prominent. There are multiple small hypodensities scattered in both kidneys with the largest along lower pole the right measuring 4 cm and are unchanged.  There is mild hydronephrosis on left with a 1 mm stone along the upper pole which is less prominent.  There is mild hydroureter on the left extending into the pelvis with a 6.5 x 6 mm stone lodged in the ureteral vesicle junction. GI/Bowel:   There is moderate feces throughout the colon and rectum which is more  prominent. The small bowel is normal caliber. There are surgical sutures along the hepatic flexure which is unchanged. The appendix is not visualized. The small bowel is normal caliber. The mesentery is unremarkable. There is no bowel wall thickening. Pelvis: The bladder is unremarkable. The uterus has been removed with no adnexal mass or free fluid. The mesentery is unremarkable. Peritoneum/Retroperitoneum: There is moderate calcified plaque throughout the aorta which is normal caliber with no aneurysm or dissection and no retroperitoneal mass or adenopathy is seen. There are diverticula along the sigmoid colon with no pericolonic inflammation Bones/Soft Tissues: There are moderate degenerative changes throughout the spine with no aggressive osseous lesion. 6.5 mm stone lodged in the ureteral vesicle junction on the left causing mild obstruction. Bilateral renal cysts which are unchanged a tiny left renal stone which is less prominent Moderate constipation which is more prominent with no obstruction. Status post hysterectomy with no pelvic mass or active inflammation. Previous cholecystectomy and chronic liver changes with small cysts scattered in the liver and tiny hypodensities inferiorly in the right lobe which probably also represent cysts but are too small to further characterize and appear unchanged. Previous cholecystectomy and prominence of the common duct which is unchanged Subsegmental atelectasis vs recurrent infiltrates or residual scarring scattered along the right lung base which is less prominent.        Thank you Caryn Ramirez for the opportunity to be involved in this patient's care.    -----------------------------  Shady Hale MD  [facility]

## 2023-01-05 VITALS
WEIGHT: 152.34 LBS | DIASTOLIC BLOOD PRESSURE: 78 MMHG | OXYGEN SATURATION: 92 % | BODY MASS INDEX: 25.38 KG/M2 | HEIGHT: 65 IN | TEMPERATURE: 98.2 F | RESPIRATION RATE: 20 BRPM | HEART RATE: 71 BPM | SYSTOLIC BLOOD PRESSURE: 168 MMHG

## 2023-01-05 LAB
ALBUMIN SERPL-MCNC: 3.7 G/DL (ref 3.4–5)
ANION GAP SERPL CALCULATED.3IONS-SCNC: 11 MMOL/L (ref 3–16)
BASOPHILS ABSOLUTE: 0.1 K/UL (ref 0–0.2)
BASOPHILS RELATIVE PERCENT: 0.7 %
BUN BLDV-MCNC: 13 MG/DL (ref 7–20)
CALCIUM SERPL-MCNC: 9.3 MG/DL (ref 8.3–10.6)
CHLORIDE BLD-SCNC: 104 MMOL/L (ref 99–110)
CO2: 26 MMOL/L (ref 21–32)
CREAT SERPL-MCNC: 0.6 MG/DL (ref 0.6–1.2)
EOSINOPHILS ABSOLUTE: 0.1 K/UL (ref 0–0.6)
EOSINOPHILS RELATIVE PERCENT: 1.5 %
GFR SERPL CREATININE-BSD FRML MDRD: >60 ML/MIN/{1.73_M2}
GLUCOSE BLD-MCNC: 84 MG/DL (ref 70–99)
HCT VFR BLD CALC: 34.9 % (ref 36–48)
HEMOGLOBIN: 11.8 G/DL (ref 12–16)
LYMPHOCYTES ABSOLUTE: 1.7 K/UL (ref 1–5.1)
LYMPHOCYTES RELATIVE PERCENT: 23 %
MAGNESIUM: 1.6 MG/DL (ref 1.8–2.4)
MCH RBC QN AUTO: 32 PG (ref 26–34)
MCHC RBC AUTO-ENTMCNC: 34 G/DL (ref 31–36)
MCV RBC AUTO: 94.2 FL (ref 80–100)
MONOCYTES ABSOLUTE: 0.7 K/UL (ref 0–1.3)
MONOCYTES RELATIVE PERCENT: 8.8 %
NEUTROPHILS ABSOLUTE: 4.9 K/UL (ref 1.7–7.7)
NEUTROPHILS RELATIVE PERCENT: 66 %
PDW BLD-RTO: 12 % (ref 12.4–15.4)
PHOSPHORUS: 1.9 MG/DL (ref 2.5–4.9)
PLATELET # BLD: 200 K/UL (ref 135–450)
PMV BLD AUTO: 10.1 FL (ref 5–10.5)
POTASSIUM SERPL-SCNC: 3.1 MMOL/L (ref 3.5–5.1)
RBC # BLD: 3.7 M/UL (ref 4–5.2)
SODIUM BLD-SCNC: 141 MMOL/L (ref 136–145)
WBC # BLD: 7.5 K/UL (ref 4–11)

## 2023-01-05 PROCEDURE — 2500000003 HC RX 250 WO HCPCS: Performed by: INTERNAL MEDICINE

## 2023-01-05 PROCEDURE — 6370000000 HC RX 637 (ALT 250 FOR IP): Performed by: UROLOGY

## 2023-01-05 PROCEDURE — 83735 ASSAY OF MAGNESIUM: CPT

## 2023-01-05 PROCEDURE — 6360000002 HC RX W HCPCS: Performed by: INTERNAL MEDICINE

## 2023-01-05 PROCEDURE — 2580000003 HC RX 258: Performed by: UROLOGY

## 2023-01-05 PROCEDURE — 2580000003 HC RX 258: Performed by: INTERNAL MEDICINE

## 2023-01-05 PROCEDURE — 36415 COLL VENOUS BLD VENIPUNCTURE: CPT

## 2023-01-05 PROCEDURE — 80069 RENAL FUNCTION PANEL: CPT

## 2023-01-05 PROCEDURE — 6370000000 HC RX 637 (ALT 250 FOR IP): Performed by: INTERNAL MEDICINE

## 2023-01-05 PROCEDURE — 94760 N-INVAS EAR/PLS OXIMETRY 1: CPT

## 2023-01-05 PROCEDURE — 85025 COMPLETE CBC W/AUTO DIFF WBC: CPT

## 2023-01-05 RX ORDER — BISACODYL 10 MG
10 SUPPOSITORY, RECTAL RECTAL DAILY PRN
Qty: 30 SUPPOSITORY | Refills: 0 | Status: SHIPPED | OUTPATIENT
Start: 2023-01-05 | End: 2023-02-04

## 2023-01-05 RX ORDER — LANOLIN ALCOHOL/MO/W.PET/CERES
400 CREAM (GRAM) TOPICAL DAILY
Qty: 30 TABLET | Refills: 0 | Status: SHIPPED | OUTPATIENT
Start: 2023-01-06

## 2023-01-05 RX ORDER — MAGNESIUM SULFATE IN WATER 40 MG/ML
2000 INJECTION, SOLUTION INTRAVENOUS ONCE
Status: DISCONTINUED | OUTPATIENT
Start: 2023-01-05 | End: 2023-01-05 | Stop reason: HOSPADM

## 2023-01-05 RX ORDER — POTASSIUM CHLORIDE 20 MEQ/1
40 TABLET, EXTENDED RELEASE ORAL
Status: DISPENSED | OUTPATIENT
Start: 2023-01-05 | End: 2023-01-05

## 2023-01-05 RX ORDER — LANOLIN ALCOHOL/MO/W.PET/CERES
400 CREAM (GRAM) TOPICAL DAILY
Status: DISCONTINUED | OUTPATIENT
Start: 2023-01-06 | End: 2023-01-05 | Stop reason: HOSPADM

## 2023-01-05 RX ORDER — POTASSIUM CHLORIDE 20 MEQ/1
20 TABLET, EXTENDED RELEASE ORAL 2 TIMES DAILY WITH MEALS
Status: DISCONTINUED | OUTPATIENT
Start: 2023-01-06 | End: 2023-01-05 | Stop reason: HOSPADM

## 2023-01-05 RX ADMIN — DOCUSATE SODIUM 100 MG: 100 CAPSULE, LIQUID FILLED ORAL at 09:31

## 2023-01-05 RX ADMIN — POTASSIUM CHLORIDE 40 MEQ: 1500 TABLET, EXTENDED RELEASE ORAL at 11:55

## 2023-01-05 RX ADMIN — PANTOPRAZOLE SODIUM 40 MG: 40 TABLET, DELAYED RELEASE ORAL at 06:13

## 2023-01-05 RX ADMIN — VALSARTAN 80 MG: 80 TABLET ORAL at 09:30

## 2023-01-05 RX ADMIN — MAGNESIUM SULFATE HEPTAHYDRATE 1000 MG: 1 INJECTION, SOLUTION INTRAVENOUS at 12:47

## 2023-01-05 RX ADMIN — POLYETHYLENE GLYCOL 3350 17 G: 17 POWDER, FOR SOLUTION ORAL at 09:31

## 2023-01-05 RX ADMIN — BNT162B2 ORIGINAL AND OMICRON BA.4/BA.5 0.3 ML: .1125; .1125 INJECTION, SUSPENSION INTRAMUSCULAR at 13:13

## 2023-01-05 RX ADMIN — CARBIDOPA AND LEVODOPA 2.5 TABLET: 25; 100 TABLET ORAL at 14:03

## 2023-01-05 RX ADMIN — AMLODIPINE BESYLATE 5 MG: 5 TABLET ORAL at 09:34

## 2023-01-05 RX ADMIN — DOCUSATE SODIUM 50 MG AND SENNOSIDES 8.6 MG 1 TABLET: 8.6; 5 TABLET, FILM COATED ORAL at 09:31

## 2023-01-05 RX ADMIN — ACETAMINOPHEN 325MG 650 MG: 325 TABLET ORAL at 12:56

## 2023-01-05 RX ADMIN — POTASSIUM CHLORIDE 40 MEQ: 1500 TABLET, EXTENDED RELEASE ORAL at 09:31

## 2023-01-05 RX ADMIN — ENOXAPARIN SODIUM 40 MG: 100 INJECTION SUBCUTANEOUS at 09:31

## 2023-01-05 RX ADMIN — Medication 10 ML: at 09:34

## 2023-01-05 RX ADMIN — MAGNESIUM SULFATE HEPTAHYDRATE 1000 MG: 1 INJECTION, SOLUTION INTRAVENOUS at 10:12

## 2023-01-05 RX ADMIN — SODIUM CHLORIDE, PRESERVATIVE FREE 10 ML: 5 INJECTION INTRAVENOUS at 11:56

## 2023-01-05 RX ADMIN — LEVOTHYROXINE SODIUM 25 MCG: 0.03 TABLET ORAL at 06:13

## 2023-01-05 RX ADMIN — CARBIDOPA AND LEVODOPA 2.5 TABLET: 25; 100 TABLET ORAL at 09:30

## 2023-01-05 ASSESSMENT — PAIN - FUNCTIONAL ASSESSMENT: PAIN_FUNCTIONAL_ASSESSMENT: PREVENTS OR INTERFERES SOME ACTIVE ACTIVITIES AND ADLS

## 2023-01-05 ASSESSMENT — PAIN DESCRIPTION - DESCRIPTORS: DESCRIPTORS: DISCOMFORT

## 2023-01-05 ASSESSMENT — PAIN SCALES - GENERAL
PAINLEVEL_OUTOF10: 0
PAINLEVEL_OUTOF10: 4
PAINLEVEL_OUTOF10: 1

## 2023-01-05 ASSESSMENT — PAIN DESCRIPTION - LOCATION: LOCATION: ABDOMEN

## 2023-01-05 ASSESSMENT — PAIN DESCRIPTION - ORIENTATION: ORIENTATION: LOWER

## 2023-01-05 NOTE — PROGRESS NOTES
Discharge instructions, follow-ups and  prescriptions reviewed with patient and her  [facility]. Both informed the importance of medication compliance. Patient and her spouse verbalized understanding. IV removed, catheter intact, site unremarkable. All questions were answered. Medications delivered to patient by [facility].

## 2023-01-05 NOTE — PLAN OF CARE
Problem: Gastrointestinal - Adult  Goal: Minimal or absence of nausea and vomiting  Outcome: Progressing     Problem: Gastrointestinal - Adult  Goal: Maintains or returns to baseline bowel function  Outcome: Progressing     Problem: Gastrointestinal - Adult  Goal: Maintains adequate nutritional intake  Outcome: Progressing

## 2023-01-05 NOTE — PLAN OF CARE
Problem: Discharge Planning  Goal: Discharge to home or other facility with appropriate resources  1/5/2023 0105 by Tania Guzmán RN  Outcome: Progressing  Flowsheets (Taken 1/4/2023 1949)  Discharge to home or other facility with appropriate resources:   Identify barriers to discharge with patient and caregiver   Arrange for needed discharge resources and transportation as appropriate   Identify discharge learning needs (meds, wound care, etc)  1/4/2023 1154 by Louisa Ruiz RN  Outcome: Progressing     Problem: Pain  Goal: Verbalizes/displays adequate comfort level or baseline comfort level  1/5/2023 0105 by Tania Guzmán RN  Outcome: Progressing  1/4/2023 1154 by Louisa Ruiz RN  Outcome: Progressing     Problem: ABCDS Injury Assessment  Goal: Absence of physical injury  1/5/2023 0105 by Tania Guzmán RN  Outcome: Progressing  1/4/2023 1154 by Louisa Ruiz RN  Outcome: Progressing     Problem: Skin/Tissue Integrity  Goal: Absence of new skin breakdown  Description: 1. Monitor for areas of redness and/or skin breakdown  2. Assess vascular access sites hourly  3. Every 4-6 hours minimum:  Change oxygen saturation probe site  4. Every 4-6 hours:  If on nasal continuous positive airway pressure, respiratory therapy assess nares and determine need for appliance change or resting period.   Outcome: Progressing Take one acebutolol a day for five days and then three doses of it every other day. Start the losartan right away. Monitor BP and let us know how it is.     Use some wet to dry dressings at night on wound after soaking with a warm washcloth for three or five days.

## 2023-01-05 NOTE — PLAN OF CARE
Problem: Discharge Planning  Goal: Discharge to home or other facility with appropriate resources  1/5/2023 1145 by Yfn Benjamin RN  Outcome: Progressing  Flowsheets (Taken 1/5/2023 0915)  Discharge to home or other facility with appropriate resources:   Identify barriers to discharge with patient and caregiver   Arrange for needed discharge resources and transportation as appropriate   Identify discharge learning needs (meds, wound care, etc)     Problem: Pain  Goal: Verbalizes/displays adequate comfort level or baseline comfort level  1/5/2023 1145 by Yfn Benjamin RN  Outcome: Progressing     Problem: ABCDS Injury Assessment  Goal: Absence of physical injury  1/5/2023 1141 by Yfn Benjamin RN  Outcome: Progressing  Flowsheets (Taken 1/5/2023 0915)  Absence of Physical Injury: Implement safety measures based on patient assessment     Problem: Skin/Tissue Integrity  Goal: Absence of new skin breakdown  Description: 1. Monitor for areas of redness and/or skin breakdown  2. Assess vascular access sites hourly  3. Every 4-6 hours minimum:  Change oxygen saturation probe site  4. Every 4-6 hours:  If on nasal continuous positive airway pressure, respiratory therapy assess nares and determine need for appliance change or resting period.   1/5/2023 1145 by Yfn Benjamin RN  Outcome: Progressing

## 2023-01-05 NOTE — PROGRESS NOTES
X 6 bowel movement so far this shift   BM liquid with several table tennis size hard stool.    Potassium and magnesium replaced.    Family updated on care    Patient in bed  Call light within reach    No needs voiced at this time

## 2023-01-05 NOTE — CARE COORDINATION
DISCHARGE SUMMARY     DATE OF DISCHARGE: 1/3/22    DISCHARGE DESTINATION: Home with     HOME CARE: No    HEMODIALYSIS: No    TRANSPORTATION: Private Car    NEW DME ORDERED: no    COMMENTS: Discharge to home.  will transport.   Electronically signed by Jarvis Gentile RN on 1/5/2023 at 2:11 PM

## 2023-01-05 NOTE — DISCHARGE SUMMARY
Hospital Discharge Summary    Patient's PCP: Otf Gabriel  Admit Date: 1/2/2023   Discharge Date: 1/5/2023    Admitting Physician: Dr. Shahid Mares MD  Discharge Physician: Dr. Rayleen Goldberg, MD     Consults:   IP CONSULT TO UROLOGY    Brief HPI: Patient admitted with kidney stone    Brief hospital course: 59-year-old female with history of Parkinson's disease, anxiety disorder, GERD, history of kidney stones, thrombosis, essential hypertension, chronic respiratory failure with hypoxia (on 2 lpm supplemental oxygen at night time), who presents to the emergency room with complaints of abdominal pain, diagnosed with left UVJ stone and constipation. Assessment/plan:   Ureterolithiasis. Left UVJ stone measuring 6 mm. Continue multimodal pain regimen. Urology has been consulted. Urinalysis not consistent with infection; was previously on Rocephin, discontinued on 1/3/2023. Essential hypertension. SBP usually in the 120s to 140s at home. Due to history of Parkinson's disease, likely has labile BP. She has had some good and some elevated BP readings. After discussion with patient and , decision was made not to make changes to antihypertensive medications, as she has been closely monitored by her cardiologist, and blood pressure has been mostly good at home. Constipation, resolved. Patient has had multiple bowel movements following multiple bowel regimens in the hospital; she did not have a bowel movement until enema was performed. Bowel regimen prescribed at the time of discharge. Hypokalemia and hypomagnesemia. Potassium and magnesium replacements have been ordered prior to discharge. Chronic respiratory failure with hypoxia. Uses 2 lpm supplemental oxygen during sleep.   Other comobidities: history of Parkinson's disease, anxiety disorder, GERD, history of kidney stones, thrombosis, essential hypertension, chronic respiratory failure with hypoxia (on 2 lpm supplemental oxygen at night time).    Invasive procedures:  Status post left ureteroscopy, basket extraction and left ureter stent placement    Discharge Diagnoses:   See above. Physical Exam: BP (!) 168/78   Pulse 71   Temp 98.2 °F (36.8 °C) (Oral)   Resp 20   Ht 5' 5\" (1.651 m)   Wt 152 lb 5.4 oz (69.1 kg)   SpO2 91%   BMI 25.35 kg/m²   Gen/overall appearance: Not in acute distress. Alert. Oriented x3. Head: Normocephalic, atraumatic  Eyes: EOMI, good acuity  ENT: Oral mucosa moist  Neck: No JVD, thyromegaly  CVS: Nml S1S2, no MRG, RRR  Pulm: Clear bilaterally. No crackles/wheezes  Gastrointestinal: Soft, NT/ND, +BS  Musculoskeletal: No edema. Warm  Neuro: No focal deficit. Moves extremity spontaneously. Psychiatry: Appropriate affect. Not agitated. Skin: Warm, dry with normal turgor. No rash  Capillary refill: Brisk,< 3 seconds   Peripheral Pulses: +2 palpable, equal bilaterally     Significant diagnostic studies that may require follow up:  XR ABDOMEN (KUB) (SINGLE AP VIEW)    Result Date: 1/3/2023  Radiology exam is complete. No Radiologist dictation. Please follow up with ordering provider. CT ABDOMEN PELVIS W IV CONTRAST Additional Contrast? None    Result Date: 1/3/2023  EXAMINATION: CT OF THE ABDOMEN AND PELVIS WITH CONTRAST 1/2/2023 11:26 pm TECHNIQUE: CT of the abdomen and pelvis was performed with the administration of intravenous contrast. Multiplanar reformatted images are provided for review. Automated exposure control, iterative reconstruction, and/or weight based adjustment of the mA/kV was utilized to reduce the radiation dose to as low as reasonably achievable.  COMPARISON: 01/25/2022 HISTORY: ORDERING SYSTEM PROVIDED HISTORY: diffuse abdominal pain, hx of cholecystectomy, no hx of appy, hyst. or c-sect TECHNOLOGIST PROVIDED HISTORY: Additional Contrast?->None Reason for exam:->diffuse abdominal pain, hx of cholecystectomy, no hx of appy, hyst. or c-sect Decision Support Exception - unselect if not a suspected or confirmed emergency medical condition->Emergency Medical Condition (MA) Reason for Exam: diffuse abdominal pain, hx of cholecystectomy, no hx of appy, hyst. or c-sect FINDINGS: Lower Chest:   There is asymmetric elevation right hemidiaphragm with subsegmental opacities along the diaphragm extending anteriorly and posteriorly which is slightly less prominent. No endobronchial lesion is seen. The left lung is clear. Organs: There is mild hypertrophy of the caudate and left lobes of the liver with fatty replacement throughout. There are multiple hypodensities scattered throughout right lobe with the largest superiorly measuring 3.6 cm which measures water density. There are several other smaller hypodensities which do not enhance. The gallbladder has been removed with mild prominence of the common duct which tapers distally and is unchanged. The pancreas and spleen are unremarkable. The adrenals are normal.  The kidneys are normal size with perirenal stranding around both kidneys which is more prominent. There are multiple small hypodensities scattered in both kidneys with the largest along lower pole the right measuring 4 cm and are unchanged. There is mild hydronephrosis on left with a 1 mm stone along the upper pole which is less prominent. There is mild hydroureter on the left extending into the pelvis with a 6.5 x 6 mm stone lodged in the ureteral vesicle junction. GI/Bowel: There is moderate feces throughout the colon and rectum which is more prominent. The small bowel is normal caliber. There are surgical sutures along the hepatic flexure which is unchanged. The appendix is not visualized. The small bowel is normal caliber. The mesentery is unremarkable. There is no bowel wall thickening. Pelvis: The bladder is unremarkable. The uterus has been removed with no adnexal mass or free fluid. The mesentery is unremarkable.  Peritoneum/Retroperitoneum: There is moderate calcified plaque throughout the aorta which is normal caliber with no aneurysm or dissection and no retroperitoneal mass or adenopathy is seen. There are diverticula along the sigmoid colon with no pericolonic inflammation Bones/Soft Tissues: There are moderate degenerative changes throughout the spine with no aggressive osseous lesion. 6.5 mm stone lodged in the ureteral vesicle junction on the left causing mild obstruction. Bilateral renal cysts which are unchanged a tiny left renal stone which is less prominent Moderate constipation which is more prominent with no obstruction. Status post hysterectomy with no pelvic mass or active inflammation. Previous cholecystectomy and chronic liver changes with small cysts scattered in the liver and tiny hypodensities inferiorly in the right lobe which probably also represent cysts but are too small to further characterize and appear unchanged. Previous cholecystectomy and prominence of the common duct which is unchanged Subsegmental atelectasis vs recurrent infiltrates or residual scarring scattered along the right lung base which is less prominent. XR CHEST PORTABLE    Result Date: 1/4/2023  EXAMINATION: ONE XRAY VIEW OF THE CHEST 1/4/2023 11:21 am COMPARISON: July 2, 2012 HISTORY: ORDERING SYSTEM PROVIDED HISTORY: hypoxia TECHNOLOGIST PROVIDED HISTORY: Reason for exam:->hypoxia Reason for Exam: hypoxia FINDINGS: Markedly elevated right hemidiaphragm. Minimal atelectasis lung bases. Remaining lungs are clear. Old granulomatous disease noted. Lawernce Roughen No pneumothorax. Significant osteopenic changes and degenerative changes noted in the bony structures. Cholecystectomy clips noted. No acute finding. Minimal atelectasis lung bases. Treatments: As above.     Discharge Medications:     Medication List        START taking these medications      bisacodyl 10 MG suppository  Commonly known as: Dulcolax  Place 1 suppository rectally daily as needed for Constipation docusate 100 MG Caps  Commonly known as: COLACE, DULCOLAX  Take 100 mg by mouth 2 times daily as needed for Constipation     magnesium oxide 400 (240 Mg) MG tablet  Commonly known as: MAG-OX  Take 1 tablet by mouth daily  Start taking on: January 6, 2023     polyethylene glycol 17 g packet  Commonly known as: GLYCOLAX  Take 17 g by mouth daily as needed for Constipation            CONTINUE taking these medications      amLODIPine 5 MG tablet  Commonly known as: NORVASC  Take 1 tablet by mouth daily     aspirin 81 MG chewable tablet     carbidopa-levodopa  MG per tablet  Commonly known as: SINEMET     estradiol 0.1 MG/GM vaginal cream  Commonly known as: ESTRACE     ibuprofen 200 MG tablet  Commonly known as: ADVIL;MOTRIN     levothyroxine 25 MCG tablet  Commonly known as: SYNTHROID     lovastatin 40 MG tablet  Commonly known as: MEVACOR     omeprazole 40 MG delayed release capsule  Commonly known as: PRILOSEC     ondansetron 4 MG tablet  Commonly known as: ZOFRAN     sertraline 100 MG tablet  Commonly known as: ZOLOFT     valsartan 80 MG tablet  Commonly known as: DIOVAN               Where to Get Your Medications        These medications were sent to [facility] [id number] [facility] [phone]  [address]      Phone: [phone]   docusate 100 MG Caps  polyethylene glycol 17 g packet       These medications were sent to [address] [place] [address] & [place] [address] [address] [facility], [address] [place] [address]      Phone: [phone]   bisacodyl 10 MG suppository  magnesium oxide 400 (240 Mg) MG tablet       Information about where to get these medications is not yet available    Ask your nurse or doctor about these medications  amLODIPine 5 MG tablet         Activity: activity as tolerated  Diet: ADULT DIET;  Regular      Disposition: home  Discharged Condition: Stable  Follow Up:   Kendra Acevedo  [address] [facility] [place]    Schedule an appointment as soon as possible for a visit in 1 week(s)      Turner Castillo MD  [address]  [phone]    Schedule an appointment as soon as possible for a visit in 1 week(s)        Code status:  Full Code     Total time spent on discharge, finalizing medications, referrals and arranging outpatient follow up was more than 30 minutes      Thank you Dr. Mili Tovar for the opportunity to be involved in this patients care.

## 2023-01-05 NOTE — PROGRESS NOTES
[facility] Medicine Progress Note     Date:  1/5/2023    PCP: Mili Tovar (Tel: [phone])    Date of Admission: 1/2/2023    Chief complaint:   Chief Complaint   Patient presents with    Abdominal Pain     X3 days, worsened today; states she is very nauseous, no diarrhea; pain radiates thru midsection ; denies trouble urinating; states she feels weak       Brief admission history: 58-year-old female with history of Parkinson's disease, anxiety disorder, GERD, history of kidney stones, thrombosis, essential hypertension, chronic respiratory failure with hypoxia (on 2 lpm supplemental oxygen at night time), who presents to the emergency room with complaints of abdominal pain, diagnosed with left UVJ stone and constipation. Assessment/plan:   Ureterolithiasis. Left UVJ stone measuring 6 mm. Continue multimodal pain regimen. Urology has been consulted. Urinalysis not consistent with infection; was previously on Rocephin, discontinued on 1/3/2023. Essential hypertension. SBP usually in the 120s to 140s at home. Due to history of Parkinson's disease, likely has labile BP. She has had some good and some elevated BP readings. After discussion with patient and , decision was made not to make changes to antihypertensive medications, as she has been closely monitored by her cardiologist, and blood pressure has been mostly good at home. Constipation, resolved. Patient has had multiple bowel movements following multiple bowel regimens in the hospital; she did not have a bowel movement until enema was performed. Bowel regimen prescribed at the time of discharge. Hypokalemia and hypomagnesemia. Potassium and magnesium replacements have been ordered prior to discharge. Chronic respiratory failure with hypoxia. Uses 2 lpm supplemental oxygen during sleep.   Other comobidities: history of Parkinson's disease, anxiety disorder, GERD, history of kidney stones, thrombosis, essential hypertension, chronic respiratory failure with hypoxia (on 2 lpm supplemental oxygen at night time). Disposition. Patient is stable for discharge home today. Discussed with patient at bedside. Discussed with nursing staff. Diet: ADULT DIET; Regular    Code status: Full Code   ----------  Subjective  Patient has had multiple bowel movements today    Objective  Physical exam:  Vitals: BP (!) 168/78   Pulse 71   Temp 98.2 °F (36.8 °C) (Oral)   Resp 20   Ht 5' 5\" (1.651 m)   Wt 152 lb 5.4 oz (69.1 kg)   SpO2 91%   BMI 25.35 kg/m²   Gen/overall appearance: Not in acute distress. Alert. Oriented x3. Head: Normocephalic, atraumatic  Eyes: EOMI, good acuity  ENT: Oral mucosa moist  Neck: No JVD, thyromegaly  CVS: Nml S1S2, no MRG, RRR  Pulm: Clear bilaterally. No crackles/wheezes  Gastrointestinal: Soft, NT/ND, +BS  Musculoskeletal: No edema. Warm  Neuro: No focal deficit. Moves extremity spontaneously. Psychiatry: Appropriate affect. Not agitated. Skin: Warm, dry with normal turgor. No rash  Capillary refill: Brisk,< 3 seconds   Peripheral Pulses: +2 palpable, equal bilaterally      24HR INTAKE/OUTPUT:    Intake/Output Summary (Last 24 hours) at 1/5/2023 1226  Last data filed at 1/5/2023 1122  Gross per 24 hour   Intake 68.21 ml   Output --   Net 68.21 ml       No intake/output data recorded.   I/O this shift:  In: 68.2 [I.V.:68.2]  Out: -   Meds:    potassium chloride  40 mEq Oral Q2H    magnesium sulfate  2,000 mg IntraVENous Once    [START ON 1/6/2023] magnesium oxide  400 mg Oral Daily    [START ON 1/6/2023] potassium chloride  20 mEq Oral BID WC    sodium chloride flush  5-40 mL IntraVENous 2 times per day    enoxaparin  40 mg SubCUTAneous Daily    bisacodyl  10 mg Rectal Once    docusate sodium  100 mg Oral Daily    carbidopa-levodopa  2.5 tablet Oral TID    pantoprazole  40 mg Oral QAM AC    valsartan  80 mg Oral Daily    sodium chloride flush  5-40 mL IntraVENous 2 times per day    amLODIPine  5 mg Oral BID levothyroxine  25 mcg Oral Daily    atorvastatin  20 mg Oral Nightly    sertraline  100 mg Oral Nightly     Infusions:    sodium chloride      sodium chloride       PRN Meds: sodium chloride flush, sodium chloride, polyethylene glycol, potassium chloride **OR** potassium alternative oral replacement **OR** potassium chloride, magnesium sulfate, sodium phosphate IVPB **OR** sodium phosphate IVPB, sodium chloride flush, sodium chloride, ondansetron **OR** ondansetron, acetaminophen, oxyCODONE **OR** oxyCODONE, morphine **OR** morphine    Labs/imaging:  CBC:   Recent Labs     01/02/23  2236 01/04/23  0538 01/05/23  0600   WBC 11.1* 6.3 7.5   HGB 13.4 11.6* 11.8*    185 200       BMP:    Recent Labs     01/03/23  0842 01/04/23  0538 01/05/23  0559    142 141   K 3.5 3.3* 3.1*    104 104   CO2 27 26 26   BUN 12 15 13   CREATININE 0.8 0.7 0.6   GLUCOSE 92 103* 84       Hepatic:   Recent Labs     01/02/23  2236   AST 13*   ALT <5*   BILITOT 0.4   ALKPHOS 111         Sherman Ag MD  -------------------------------  [facility]ist

## 2023-01-05 NOTE — PROGRESS NOTES
Pt had no BM overnight . States is not passing gas. Bowel sounds present . Abdomen distended but soft . Tap water enema given Per order. No results as of yet.

## 2023-01-07 LAB
CALCULI COMPOSITION: NORMAL
MASS: 44 MG
STONE DESCRIPTION: NORMAL

## 2023-01-27 NOTE — TELEPHONE ENCOUNTER
Ernesto Dumont called in with Angy Mills on speaker phone reviewed results from PSG/PSGw/bpap titration and need for bpap - see phone encounter from 12/21/21 Information: Selecting Yes will display possible errors in your note based on the variables you have selected. This validation is only offered as a suggestion for you. PLEASE NOTE THAT THE VALIDATION TEXT WILL BE REMOVED WHEN YOU FINALIZE YOUR NOTE. IF YOU WANT TO FAX A PRELIMINARY NOTE YOU WILL NEED TO TOGGLE THIS TO 'NO' IF YOU DO NOT WANT IT IN YOUR FAXED NOTE.

## 2023-07-31 ENCOUNTER — HOSPITAL ENCOUNTER (OUTPATIENT)
Dept: WOMENS IMAGING | Age: 78
Discharge: HOME | End: 2023-07-31
Payer: MEDICARE

## 2023-07-31 DIAGNOSIS — Z12.31 VISIT FOR SCREENING MAMMOGRAM: ICD-10-CM

## 2023-07-31 PROCEDURE — 77067 SCR MAMMO BI INCL CAD: CPT

## 2023-08-15 ENCOUNTER — HOSPITAL ENCOUNTER (EMERGENCY)
Age: 78
Discharge: HOME | End: 2023-08-15
Attending: EMERGENCY MEDICINE
Payer: MEDICARE

## 2023-08-15 ENCOUNTER — APPOINTMENT (OUTPATIENT)
Dept: CT IMAGING | Age: 78
End: 2023-08-15
Payer: MEDICARE

## 2023-08-15 VITALS
HEIGHT: 65 IN | RESPIRATION RATE: 18 BRPM | OXYGEN SATURATION: 95 % | SYSTOLIC BLOOD PRESSURE: 140 MMHG | DIASTOLIC BLOOD PRESSURE: 74 MMHG | HEART RATE: 66 BPM | WEIGHT: 144.62 LBS | BODY MASS INDEX: 24.1 KG/M2 | TEMPERATURE: 97.2 F

## 2023-08-15 DIAGNOSIS — I82.612 SUPERFICIAL VENOUS THROMBOSIS OF LEFT UPPER EXTREMITY: Primary | ICD-10-CM

## 2023-08-15 LAB
ANION GAP SERPL CALCULATED.3IONS-SCNC: 7 MMOL/L (ref 3–16)
BASOPHILS # BLD: 0.1 K/UL (ref 0–0.2)
BASOPHILS NFR BLD: 0.6 %
BUN SERPL-MCNC: 23 MG/DL (ref 7–20)
CALCIUM SERPL-MCNC: 10.9 MG/DL (ref 8.3–10.6)
CHLORIDE SERPL-SCNC: 106 MMOL/L (ref 99–110)
CO2 SERPL-SCNC: 26 MMOL/L (ref 21–32)
CREAT SERPL-MCNC: 0.8 MG/DL (ref 0.6–1.2)
DEPRECATED RDW RBC AUTO: 12.3 % (ref 12.4–15.4)
EOSINOPHIL # BLD: 0.1 K/UL (ref 0–0.6)
EOSINOPHIL NFR BLD: 1 %
GFR SERPLBLD CREATININE-BSD FMLA CKD-EPI: >60 ML/MIN/{1.73_M2}
GLUCOSE SERPL-MCNC: 111 MG/DL (ref 70–99)
HCT VFR BLD AUTO: 40.5 % (ref 36–48)
HGB BLD-MCNC: 13.9 G/DL (ref 12–16)
LYMPHOCYTES # BLD: 1.6 K/UL (ref 1–5.1)
LYMPHOCYTES NFR BLD: 17.4 %
MCH RBC QN AUTO: 33.6 PG (ref 26–34)
MCHC RBC AUTO-ENTMCNC: 34.4 G/DL (ref 31–36)
MCV RBC AUTO: 97.7 FL (ref 80–100)
MONOCYTES # BLD: 0.7 K/UL (ref 0–1.3)
MONOCYTES NFR BLD: 7.9 %
NEUTROPHILS # BLD: 6.8 K/UL (ref 1.7–7.7)
NEUTROPHILS NFR BLD: 73.1 %
PLATELET # BLD AUTO: 215 K/UL (ref 135–450)
PMV BLD AUTO: 8.7 FL (ref 5–10.5)
POTASSIUM SERPL-SCNC: 4.4 MMOL/L (ref 3.5–5.1)
RBC # BLD AUTO: 4.14 M/UL (ref 4–5.2)
SODIUM SERPL-SCNC: 139 MMOL/L (ref 136–145)
WBC # BLD AUTO: 9.3 K/UL (ref 4–11)

## 2023-08-15 PROCEDURE — 85025 COMPLETE CBC W/AUTO DIFF WBC: CPT

## 2023-08-15 PROCEDURE — 73201 CT UPPER EXTREMITY W/DYE: CPT

## 2023-08-15 PROCEDURE — 6360000004 HC RX CONTRAST MEDICATION: Performed by: EMERGENCY MEDICINE

## 2023-08-15 PROCEDURE — 80048 BASIC METABOLIC PNL TOTAL CA: CPT

## 2023-08-15 PROCEDURE — 99285 EMERGENCY DEPT VISIT HI MDM: CPT

## 2023-08-15 RX ADMIN — IOPAMIDOL 75 ML: 755 INJECTION, SOLUTION INTRAVENOUS at 12:31

## 2023-08-15 ASSESSMENT — ENCOUNTER SYMPTOMS
NAUSEA: 0
ABDOMINAL PAIN: 0
COUGH: 0
BACK PAIN: 0
SHORTNESS OF BREATH: 0
VOMITING: 0
EYE PAIN: 0
SORE THROAT: 0

## 2023-08-15 ASSESSMENT — LIFESTYLE VARIABLES
HOW OFTEN DO YOU HAVE A DRINK CONTAINING ALCOHOL: NEVER
HOW MANY STANDARD DRINKS CONTAINING ALCOHOL DO YOU HAVE ON A TYPICAL DAY: PATIENT DOES NOT DRINK

## 2023-08-15 ASSESSMENT — PAIN DESCRIPTION - ORIENTATION: ORIENTATION: RIGHT

## 2023-08-15 ASSESSMENT — PAIN SCALES - GENERAL
PAINLEVEL_OUTOF10: 2
PAINLEVEL_OUTOF10: 4

## 2023-08-15 ASSESSMENT — PAIN DESCRIPTION - LOCATION: LOCATION: ARM

## 2023-08-15 NOTE — ED NOTES
Discharge and education instructions reviewed. Patient verbalized understanding, teach-back successful. Patient denied questions at this time. No acute distress noted. Patient instructed to follow-up as noted - return to emergency department if symptoms worsen. Patient verbalized understanding. Discharged per EDMD with discharge instructions.        Daphney Kiran RN  08/15/23 1990

## 2023-08-15 NOTE — ED PROVIDER NOTES
[address] [facility] [address] [address]      Pt Name: Eugenio Blanca  FCP:[id number]  [address] [facility] [address] [birth year]  Date of evaluation: 8/15/2023  Provider: Arcadio Montoya PA-C  Note Started: 11:15 AM EDT 8/15/2023    This patient was seen evaluate by attending physician Dr. Afsaneh Reilly MD        Chief Complaint:    Chief Complaint   Patient presents with    Mass     Pt states that she has a \" lump in right arm for one week and I haven't been to the dr for it \" ( FYI pt has sunglasses on but is not blind )         Nursing Notes, Past Medical Hx, Past Surgical Hx, Social Hx, Allergies, and Family Hx were all reviewed and agreed with or any disagreements were addressed in the HPI.    HPI: (Location, Duration, Timing, Severity, Quality, Assoc Sx, Context, Modifying factors)    History From: Patient  Limitations to history : None    Social Determinants Significantly Affecting Health : None    Chief Complaint of lump in her upper arm. Says she noticed it last week. She denies pain. No fevers. Denies any extremity weakness. Denies any other nodules or masses anywhere else. No other complaints. This is a  65 y.o. female who presents to emergency room with above complaint.     PastMedical/Surgical History:      Diagnosis Date    Anxiety     Balance disorder     unknown etiology    GERD (gastroesophageal reflux disease)     History of kidney stones     Hx of blood clots     x 1 from use of BCPs    Hyperlipidemia     Hypertension     Infection 11/2015    gastric band    Liver cyst     \"possible\"    Olfactory nerve injury     mostly resolved    Right ear injury 20 yrs ago    Thyroid disease          Procedure Laterality Date    ABDOMINOPLASTY  2012    also cosmetic surgery on arms    [address] [facility] [address]    exc. cyst lumbar    CHOLECYSTECTOMY      COLONOSCOPY      COLONOSCOPY N/A 8/19/2019    COLONOSCOPY DIAGNOSTIC performed by Robby Ugarte MD at [id number] [place] [address]

## 2023-08-15 NOTE — ED NOTES
Discharge and education instructions reviewed. Patient verbalized understanding, teach-back successful. Patient denied questions at this time. No acute distress noted. Patient instructed to follow-up as noted - return to emergency department if symptoms worsen. Patient verbalized understanding. Discharged per EDMD with discharge instructions.         Jonnathan Paredes RN  08/15/23 7031

## 2023-08-15 NOTE — DISCHARGE INSTRUCTIONS
Apply warm compresses over the area 3-4 times a day 20 minutes on  Follow-up with primary care provider  Take OTC Motrin.   Return emergency room for any worsening

## 2024-08-30 ENCOUNTER — HOSPITAL ENCOUNTER (OUTPATIENT)
Dept: WOMENS IMAGING | Age: 79
Discharge: HOME | End: 2024-08-30
Payer: MEDICARE

## 2024-08-30 VITALS — HEIGHT: 64 IN | WEIGHT: 145 LBS | BODY MASS INDEX: 24.75 KG/M2

## 2024-08-30 DIAGNOSIS — Z12.31 BREAST CANCER SCREENING BY MAMMOGRAM: ICD-10-CM

## 2024-08-30 PROCEDURE — 77063 BREAST TOMOSYNTHESIS BI: CPT

## 2024-10-18 ENCOUNTER — HOSPITAL ENCOUNTER (EMERGENCY)
Age: 79
Discharge: HOME | End: 2024-10-18
Payer: MEDICARE

## 2024-10-18 ENCOUNTER — APPOINTMENT (OUTPATIENT)
Dept: CT IMAGING | Age: 79
End: 2024-10-18
Payer: MEDICARE

## 2024-10-18 VITALS
HEIGHT: 64 IN | TEMPERATURE: 97.7 F | WEIGHT: 143.3 LBS | HEART RATE: 58 BPM | SYSTOLIC BLOOD PRESSURE: 168 MMHG | OXYGEN SATURATION: 93 % | RESPIRATION RATE: 18 BRPM | BODY MASS INDEX: 24.46 KG/M2 | DIASTOLIC BLOOD PRESSURE: 75 MMHG

## 2024-10-18 DIAGNOSIS — R79.89 ELEVATED TROPONIN: ICD-10-CM
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
## 2024-10-18 DIAGNOSIS — R23.8 SKIN BREAKDOWN: ICD-10-CM

## 2024-10-18 DIAGNOSIS — N30.00 ACUTE CYSTITIS WITHOUT HEMATURIA: ICD-10-CM

## 2024-10-18 DIAGNOSIS — S22.32XA CLOSED FRACTURE OF ONE RIB OF LEFT SIDE, INITIAL ENCOUNTER: ICD-10-CM

## 2024-10-18 LAB
ALBUMIN SERPL-MCNC: 4 G/DL (ref 3.4–5)
ALBUMIN/GLOB SERPL: 1.9 {RATIO} (ref 1.1–2.2)
ALP SERPL-CCNC: 74 U/L (ref 40–129)
ALT SERPL-CCNC: <5 U/L (ref 10–40)
ANION GAP SERPL CALCULATED.3IONS-SCNC: 8 MMOL/L (ref 3–16)
AST SERPL-CCNC: 15 U/L (ref 15–37)
BACTERIA URNS QL MICRO: ABNORMAL /HPF
BASOPHILS # BLD: 0 K/UL (ref 0–0.2)
BASOPHILS NFR BLD: 0.6 %
BILIRUB SERPL-MCNC: 0.3 MG/DL (ref 0–1)
BILIRUB UR QL STRIP.AUTO: NEGATIVE
BUN SERPL-MCNC: 17 MG/DL (ref 7–20)
CALCIUM SERPL-MCNC: 9.6 MG/DL (ref 8.3–10.6)
CHLORIDE SERPL-SCNC: 107 MMOL/L (ref 99–110)
CLARITY UR: CLEAR
CO2 SERPL-SCNC: 26 MMOL/L (ref 21–32)
COLOR UR: YELLOW
CREAT SERPL-MCNC: 0.6 MG/DL (ref 0.6–1.2)
DEPRECATED RDW RBC AUTO: 12.5 % (ref 12.4–15.4)
EKG ATRIAL RATE: 58 BPM
EKG DIAGNOSIS: NORMAL
EKG P AXIS: 43 DEGREES
EKG P-R INTERVAL: 192 MS
EKG Q-T INTERVAL: 464 MS
EKG QRS DURATION: 106 MS
EKG QTC CALCULATION (BAZETT): 455 MS
EKG R AXIS: -56 DEGREES
EKG T AXIS: 94 DEGREES
EKG VENTRICULAR RATE: 58 BPM
EOSINOPHIL # BLD: 0.1 K/UL (ref 0–0.6)
EOSINOPHIL NFR BLD: 1.5 %
EPI CELLS #/AREA URNS AUTO: 0 /HPF (ref 0–5)
GFR SERPLBLD CREATININE-BSD FMLA CKD-EPI: >90 ML/MIN/{1.73_M2}
GLUCOSE SERPL-MCNC: 99 MG/DL (ref 70–99)
GLUCOSE UR STRIP.AUTO-MCNC: NEGATIVE MG/DL
HCT VFR BLD AUTO: 39.7 % (ref 36–48)
HGB BLD-MCNC: 13.4 G/DL (ref 12–16)
HGB UR QL STRIP.AUTO: NEGATIVE
HYALINE CASTS #/AREA URNS AUTO: 0 /LPF (ref 0–8)
KETONES UR STRIP.AUTO-MCNC: ABNORMAL MG/DL
LEUKOCYTE ESTERASE UR QL STRIP.AUTO: ABNORMAL
LYMPHOCYTES # BLD: 0.9 K/UL (ref 1–5.1)
LYMPHOCYTES NFR BLD: 12.8 %
MCH RBC QN AUTO: 32.8 PG (ref 26–34)
MCHC RBC AUTO-ENTMCNC: 33.8 G/DL (ref 31–36)
MCV RBC AUTO: 97.3 FL (ref 80–100)
MONOCYTES # BLD: 0.6 K/UL (ref 0–1.3)
MONOCYTES NFR BLD: 8 %
NEUTROPHILS # BLD: 5.7 K/UL (ref 1.7–7.7)
NEUTROPHILS NFR BLD: 77.1 %
NITRITE UR QL STRIP.AUTO: POSITIVE
PH UR STRIP.AUTO: 5.5 [PH] (ref 5–8)
PLATELET # BLD AUTO: 233 K/UL (ref 135–450)
PMV BLD AUTO: 9.1 FL (ref 5–10.5)
POTASSIUM SERPL-SCNC: 3.8 MMOL/L (ref 3.5–5.1)
PROT SERPL-MCNC: 6.1 G/DL (ref 6.4–8.2)
PROT UR STRIP.AUTO-MCNC: ABNORMAL MG/DL
RBC # BLD AUTO: 4.08 M/UL (ref 4–5.2)
RBC CLUMPS #/AREA URNS AUTO: 1 /HPF (ref 0–4)
SODIUM SERPL-SCNC: 141 MMOL/L (ref 136–145)
SP GR UR STRIP.AUTO: 1.02 (ref 1–1.03)
TROPONIN, HIGH SENSITIVITY: 16 NG/L (ref 0–14)
TROPONIN, HIGH SENSITIVITY: 22 NG/L (ref 0–14)
UA DIPSTICK W REFLEX MICRO PNL UR: YES
URN SPEC COLLECT METH UR: ABNORMAL
UROBILINOGEN UR STRIP-ACNC: 1 E.U./DL
WBC # BLD AUTO: 7.4 K/UL (ref 4–11)
WBC #/AREA URNS AUTO: 4 /HPF (ref 0–5)

## 2024-10-18 PROCEDURE — 36415 COLL VENOUS BLD VENIPUNCTURE: CPT

## 2024-10-18 PROCEDURE — 80053 COMPREHEN METABOLIC PANEL: CPT

## 2024-10-18 PROCEDURE — 71260 CT THORAX DX C+: CPT

## 2024-10-18 PROCEDURE — 93005 ELECTROCARDIOGRAM TRACING: CPT | Performed by: PHYSICIAN ASSISTANT

## 2024-10-18 PROCEDURE — 70450 CT HEAD/BRAIN W/O DYE: CPT

## 2024-10-18 PROCEDURE — 84484 ASSAY OF TROPONIN QUANT: CPT

## 2024-10-18 PROCEDURE — 99285 EMERGENCY DEPT VISIT HI MDM: CPT

## 2024-10-18 PROCEDURE — 85025 COMPLETE CBC W/AUTO DIFF WBC: CPT

## 2024-10-18 PROCEDURE — 6360000004 HC RX CONTRAST MEDICATION: Performed by: PHYSICIAN ASSISTANT

## 2024-10-18 PROCEDURE — 6370000000 HC RX 637 (ALT 250 FOR IP): Performed by: PHYSICIAN ASSISTANT

## 2024-10-18 PROCEDURE — 81001 URINALYSIS AUTO W/SCOPE: CPT

## 2024-10-18 PROCEDURE — 72125 CT NECK SPINE W/O DYE: CPT

## 2024-10-18 RX ORDER — CARBIDOPA AND LEVODOPA 25; 100 MG/1; MG/1
1 TABLET ORAL ONCE
Status: COMPLETED | OUTPATIENT
Start: 2024-10-18 | End: 2024-10-18

## 2024-10-18 RX ORDER — IOPAMIDOL 755 MG/ML
75 INJECTION, SOLUTION INTRAVASCULAR
Status: COMPLETED | OUTPATIENT
Start: 2024-10-18 | End: 2024-10-18

## 2024-10-18 RX ORDER — CARBIDOPA AND LEVODOPA 25; 100 MG/1; MG/1
2 TABLET ORAL ONCE
Status: COMPLETED | OUTPATIENT
Start: 2024-10-18 | End: 2024-10-18

## 2024-10-18 RX ORDER — ACETAMINOPHEN 500 MG
1000 TABLET ORAL EVERY 8 HOURS PRN
Qty: 60 TABLET | Refills: 0 | Status: SHIPPED | OUTPATIENT
Start: 2024-10-18 | End: 2024-10-28

## 2024-10-18 RX ORDER — CEFUROXIME AXETIL 250 MG/1
250 TABLET ORAL 2 TIMES DAILY
Qty: 14 TABLET | Refills: 0 | Status: SHIPPED | OUTPATIENT
Start: 2024-10-18 | End: 2024-10-25

## 2024-10-18 RX ORDER — IBUPROFEN 600 MG/1
600 TABLET, FILM COATED ORAL 3 TIMES DAILY PRN
Qty: 30 TABLET | Refills: 0 | Status: SHIPPED | OUTPATIENT
Start: 2024-10-18

## 2024-10-18 RX ADMIN — CARBIDOPA AND LEVODOPA 1 TABLET: 25; 100 TABLET ORAL at 14:21

## 2024-10-18 RX ADMIN — CARBIDOPA AND LEVODOPA 2 TABLET: 25; 100 TABLET ORAL at 14:21

## 2024-10-18 RX ADMIN — IOPAMIDOL 75 ML: 755 INJECTION, SOLUTION INTRAVENOUS at 12:19

## 2024-10-18 ASSESSMENT — PAIN SCALES - GENERAL: PAINLEVEL_OUTOF10: 2

## 2024-10-18 ASSESSMENT — PAIN DESCRIPTION - ORIENTATION: ORIENTATION: LOWER

## 2024-10-18 ASSESSMENT — PAIN DESCRIPTION - PAIN TYPE: TYPE: ACUTE PAIN

## 2024-10-18 ASSESSMENT — PAIN - FUNCTIONAL ASSESSMENT
PAIN_FUNCTIONAL_ASSESSMENT: 0-10
PAIN_FUNCTIONAL_ASSESSMENT: ACTIVITIES ARE NOT PREVENTED

## 2024-10-18 ASSESSMENT — PAIN DESCRIPTION - DESCRIPTORS: DESCRIPTORS: ACHING

## 2024-10-18 ASSESSMENT — PAIN DESCRIPTION - LOCATION: LOCATION: BACK

## 2024-10-18 ASSESSMENT — PAIN DESCRIPTION - FREQUENCY: FREQUENCY: CONTINUOUS

## 2024-10-18 ASSESSMENT — PAIN DESCRIPTION - ONSET: ONSET: ON-GOING

## 2024-10-18 NOTE — ED PROVIDER NOTES
ABDOMINOPLASTY  2012    also cosmetic surgery on arms    BACK SURGERY  1998    exc. cyst lumbar    CHOLECYSTECTOMY      COLONOSCOPY      COLONOSCOPY N/A 8/19/2019    COLONOSCOPY DIAGNOSTIC performed by Brandt Amin MD at [facility]    COSMETIC SURGERY      breast reduction    CYSTOSCOPY Left 1/3/2023    CYSTOSCOPY LEFT STENT PLACEMENT performed by Matthew Nettles MD at [facility] OR    ENDOSCOPY, COLON, DIAGNOSTIC      EYE SURGERY      cataracts    FACIAL COSMETIC SURGERY  1986    also breast reduction    FACIAL COSMETIC SURGERY  [id number]     FACELIFT, BROWLIFT, UPPER AND LOWER BLEPHEROPLASTY AND    GASTRIC BAND  2011    also port removal subsequently (3/2015)    HYSTERECTOMY (CERVIX STATUS UNKNOWN)  1989    OTHER SURGICAL HISTORY  11/24/2015    LAPAROSCOPIC GASTRIC BAND REMOVAL         OVARIAN CYST SURGERY  1987    UTERINE FIBROID SURGERY  1974       CURRENTMEDICATIONS       Discharge Medication List as of 10/18/2024  2:26 PM        CONTINUE these medications which have NOT CHANGED    Details   magnesium oxide (MAG-OX) 400 (240 Mg) MG tablet Take 1 tablet by mouth daily, Disp-30 tablet, R-0Normal      amLODIPine (NORVASC) 5 MG tablet Take 1 tablet by mouth daily, Disp-30 tablet, R-3NO PRINT      valsartan (DIOVAN) 80 MG tablet Take 80 mg by mouth dailyHistorical Med      carbidopa-levodopa (SINEMET)  MG per tablet Take 2.5 tablets by mouth 3 times dailyHistorical Med      omeprazole (PRILOSEC) 40 MG delayed release capsule Take 40 mg by mouth dailyHistorical Med      ondansetron (ZOFRAN) 4 MG tablet Take 4 mg by mouth every 8 hours as neededHistorical Med      docusate sodium (COLACE, DULCOLAX) 100 MG CAPS Take 100 mg by mouth 2 times daily as needed for Constipation, Disp-60 capsule, R-0Normal      aspirin 81 MG chewable tablet Take 81 mg by mouth dailyHistorical Med      estradiol (ESTRACE) 0.1 MG/GM vaginal cream Place 0.5 g vaginally Twice a WeekHistorical Med      lovastatin (MEVACOR) 40 MG tablet

## 2024-10-18 NOTE — DISCHARGE INSTRUCTIONS
You are seen in the emergency department today for a fall and left-sided rib pain.  You are found to have a left rib fracture of the ninth rib.  You are given an incentive spirometer so that you continue to inflate your lungs despite the pain.  Additionally you will be prescribed acetaminophen, ibuprofen and lidocaine patches for your discomfort.  The instructions on how to take these will be on your medication bottles.  You are also found to have a urinary tract infection today.  You were treated with antibiotic, Ceftin.  Additionally there is some skin breakdown of the area.  He will be given a ointment for this.  It is important to schedule a follow-up appointment with your urologist, Dr. Brothers if these become more frequent.    Your troponin test here were elevated, but unchanged.  This is a cardiac enzyme test.  We discussed staying in the hospital for further evaluation of this.  You decided that you would prefer to go home and follow-up with Dr. Arreguin.  Give him a call to set up a follow-up appointment.  If your falls become more frequent, it is important to return to the emergency department.  If you develop any new symptoms such as chest pain or confusion, return right away.

## 2024-10-23 ENCOUNTER — HOSPITAL ENCOUNTER (OUTPATIENT)
Age: 79
Setting detail: OBSERVATION
Discharge: HOME | End: 2024-10-25
Attending: EMERGENCY MEDICINE | Admitting: HOSPITALIST
Payer: MEDICARE

## 2024-10-23 ENCOUNTER — APPOINTMENT (OUTPATIENT)
Age: 79
End: 2024-10-23
Attending: HOSPITALIST
Payer: MEDICARE

## 2024-10-23 ENCOUNTER — APPOINTMENT (OUTPATIENT)
Dept: GENERAL RADIOLOGY | Age: 79
End: 2024-10-23
Payer: MEDICARE

## 2024-10-23 ENCOUNTER — APPOINTMENT (OUTPATIENT)
Dept: CT IMAGING | Age: 79
End: 2024-10-23
Payer: MEDICARE

## 2024-10-23 ENCOUNTER — APPOINTMENT (OUTPATIENT)
Dept: MRI IMAGING | Age: 79
End: 2024-10-23
Payer: MEDICARE

## 2024-10-23 DIAGNOSIS — I63.411 CEREBROVASCULAR ACCIDENT (CVA) DUE TO EMBOLISM OF RIGHT MIDDLE CEREBRAL ARTERY (HCC): ICD-10-CM

## 2024-10-23 DIAGNOSIS — E87.6 HYPOKALEMIA: ICD-10-CM

## 2024-10-23 DIAGNOSIS — R41.82 ALTERED MENTAL STATUS, UNSPECIFIED ALTERED MENTAL STATUS TYPE: Primary | ICD-10-CM

## 2024-10-23 DIAGNOSIS — G45.9 TIA (TRANSIENT ISCHEMIC ATTACK): ICD-10-CM

## 2024-10-23 PROBLEM — G30.1 MILD LATE ONSET ALZHEIMER'S DEMENTIA WITHOUT BEHAVIORAL DISTURBANCE, PSYCHOTIC DISTURBANCE, MOOD DISTURBANCE, OR ANXIETY (HCC): Status: ACTIVE | Noted: 2024-10-23

## 2024-10-23 PROBLEM — I10 PRIMARY HYPERTENSION: Status: ACTIVE | Noted: 2024-10-23

## 2024-10-23 PROBLEM — F02.A0 MILD LATE ONSET ALZHEIMER'S DEMENTIA WITHOUT BEHAVIORAL DISTURBANCE, PSYCHOTIC DISTURBANCE, MOOD DISTURBANCE, OR ANXIETY (HCC): Status: ACTIVE | Noted: 2024-10-23

## 2024-10-23 LAB
ALBUMIN SERPL-MCNC: 4.2 G/DL (ref 3.4–5)
ALBUMIN/GLOB SERPL: 1.8 {RATIO} (ref 1.1–2.2)
ALP SERPL-CCNC: 68 U/L (ref 40–129)
ALT SERPL-CCNC: <5 U/L (ref 10–40)
ANION GAP SERPL CALCULATED.3IONS-SCNC: 10 MMOL/L (ref 3–16)
AST SERPL-CCNC: 17 U/L (ref 15–37)
BASE EXCESS BLDV CALC-SCNC: 0.4 MMOL/L
BASOPHILS # BLD: 0.1 K/UL (ref 0–0.2)
BASOPHILS NFR BLD: 0.7 %
BILIRUB SERPL-MCNC: <0.2 MG/DL (ref 0–1)
BUN SERPL-MCNC: 18 MG/DL (ref 7–20)
CALCIUM SERPL-MCNC: 9.4 MG/DL (ref 8.3–10.6)
CHLORIDE SERPL-SCNC: 105 MMOL/L (ref 99–110)
CO2 BLDV-SCNC: 25 MMOL/L
CO2 SERPL-SCNC: 25 MMOL/L (ref 21–32)
COHGB MFR BLDV: 1.8 %
CREAT SERPL-MCNC: 0.6 MG/DL (ref 0.6–1.2)
DEPRECATED RDW RBC AUTO: 12.5 % (ref 12.4–15.4)
ECHO AO ASC DIAM: 2.8 CM
ECHO AO ASCENDING AORTA INDEX: 1.56 CM/M2
ECHO AO ROOT DIAM: 2.5 CM
ECHO AO ROOT INDEX: 1.39 CM/M2
ECHO AR MAX VEL PISA: 4.3 M/S
ECHO AV AREA PEAK VELOCITY: 2.1 CM2
ECHO AV AREA VTI: 2.2 CM2
ECHO AV AREA/BSA PEAK VELOCITY: 1.2 CM2/M2
ECHO AV AREA/BSA VTI: 1.2 CM2/M2
ECHO AV CUSP MM: 1.5 CM
ECHO AV MEAN GRADIENT: 9 MMHG
ECHO AV MEAN VELOCITY: 1.4 M/S
ECHO AV PEAK GRADIENT: 16 MMHG
ECHO AV PEAK VELOCITY: 2 M/S
ECHO AV REGURGITANT PHT: 530 MS
ECHO AV VELOCITY RATIO: 0.8
ECHO AV VTI: 41.3 CM
ECHO BSA: 1.83 M2
ECHO LA AREA 2C: 15.9 CM2
ECHO LA AREA 4C: 18.2 CM2
ECHO LA DIAMETER INDEX: 1.78 CM/M2
ECHO LA DIAMETER: 3.2 CM
ECHO LA MAJOR AXIS: 5.7 CM
ECHO LA MINOR AXIS: 5.4 CM
ECHO LA TO AORTIC ROOT RATIO: 1.28
ECHO LA VOL BP: 44 ML (ref 22–52)
ECHO LA VOL MOD A2C: 38 ML (ref 22–52)
ECHO LA VOL MOD A4C: 50 ML (ref 22–52)
ECHO LA VOL/BSA BIPLANE: 24 ML/M2 (ref 16–34)
ECHO LA VOLUME INDEX MOD A2C: 21 ML/M2 (ref 16–34)
ECHO LA VOLUME INDEX MOD A4C: 28 ML/M2 (ref 16–34)
ECHO LV E' LATERAL VELOCITY: 9.6 CM/S
ECHO LV E' SEPTAL VELOCITY: 7.7 CM/S
ECHO LV EDV A4C: 55 ML
ECHO LV EDV INDEX A4C: 31 ML/M2
ECHO LV EF PHYSICIAN: 58 %
ECHO LV EJECTION FRACTION A4C: 62 %
ECHO LV ESV A4C: 21 ML
ECHO LV ESV INDEX A4C: 12 ML/M2
ECHO LV FRACTIONAL SHORTENING: 38 % (ref 28–44)
ECHO LV INTERNAL DIMENSION DIASTOLE INDEX: 1.78 CM/M2
ECHO LV INTERNAL DIMENSION DIASTOLIC: 3.2 CM (ref 3.9–5.3)
ECHO LV INTERNAL DIMENSION SYSTOLIC INDEX: 1.11 CM/M2
ECHO LV INTERNAL DIMENSION SYSTOLIC: 2 CM
ECHO LV IVSD: 1 CM (ref 0.6–0.9)
ECHO LV MASS 2D: 83.7 G (ref 67–162)
ECHO LV MASS INDEX 2D: 46.5 G/M2 (ref 43–95)
ECHO LV POSTERIOR WALL DIASTOLIC: 0.9 CM (ref 0.6–0.9)
ECHO LV RELATIVE WALL THICKNESS RATIO: 0.56
ECHO LVOT AREA: 2.5 CM2
ECHO LVOT AV VTI INDEX: 0.82
ECHO LVOT DIAM: 1.8 CM
ECHO LVOT MEAN GRADIENT: 6 MMHG
ECHO LVOT PEAK GRADIENT: 10 MMHG
ECHO LVOT PEAK VELOCITY: 1.6 M/S
ECHO LVOT STROKE VOLUME INDEX: 48 ML/M2
ECHO LVOT SV: 86.5 ML
ECHO LVOT VTI: 34 CM
ECHO MV A VELOCITY: 1.14 M/S
ECHO MV AREA VTI: 2.6 CM2
ECHO MV E DECELERATION TIME (DT): 341 MS
ECHO MV E VELOCITY: 0.7 M/S
ECHO MV E/A RATIO: 0.61
ECHO MV E/E' LATERAL: 7.29
ECHO MV E/E' RATIO (AVERAGED): 8.19
ECHO MV E/E' SEPTAL: 9.09
ECHO MV LVOT VTI INDEX: 0.99
ECHO MV MAX VELOCITY: 1.3 M/S
ECHO MV MEAN GRADIENT: 2 MMHG
ECHO MV MEAN VELOCITY: 0.7 M/S
ECHO MV PEAK GRADIENT: 6 MMHG
ECHO MV REGURGITANT PEAK GRADIENT: 92 MMHG
ECHO MV REGURGITANT PEAK VELOCITY: 4.8 M/S
ECHO MV VTI: 33.8 CM
ECHO PULMONARY ARTERY END DIASTOLIC PRESSURE: 14 MMHG
ECHO PV ACCELERATION TIME (AT): 50 MS
ECHO PV MAX VELOCITY: 1.6 M/S
ECHO PV MAX VELOCITY: 1.9 M/S
ECHO PV PEAK GRADIENT: 10 MMHG
ECHO RV FREE WALL PEAK S': 13.2 CM/S
ECHO RV INTERNAL DIMENSION: 3 CM
ECHO RV TAPSE: 1.7 CM (ref 1.7–?)
ECHO TV E WAVE: 0.7 M/S
ECHO TV PEAK GRADIENT: 2 MMHG
ECHO TV REGURGITANT MAX VELOCITY: 2.49 M/S
ECHO TV REGURGITANT PEAK GRADIENT: 25 MMHG
EKG ATRIAL RATE: 63 BPM
EKG DIAGNOSIS: NORMAL
EKG P AXIS: 33 DEGREES
EKG P-R INTERVAL: 204 MS
EKG Q-T INTERVAL: 476 MS
EKG QRS DURATION: 108 MS
EKG QTC CALCULATION (BAZETT): 487 MS
EKG R AXIS: -56 DEGREES
EKG T AXIS: 85 DEGREES
EKG VENTRICULAR RATE: 63 BPM
EOSINOPHIL # BLD: 0.1 K/UL (ref 0–0.6)
EOSINOPHIL NFR BLD: 1.8 %
ETHANOLAMINE SERPL-MCNC: NORMAL MG/DL (ref 0–0.08)
GFR SERPLBLD CREATININE-BSD FMLA CKD-EPI: >90 ML/MIN/{1.73_M2}
GLUCOSE BLD-MCNC: 158 MG/DL (ref 70–99)
GLUCOSE SERPL-MCNC: 168 MG/DL (ref 70–99)
HCO3 BLDV-SCNC: 24 MMOL/L (ref 23–29)
HCT VFR BLD AUTO: 39.6 % (ref 36–48)
HGB BLD-MCNC: 13.4 G/DL (ref 12–16)
INR PPP: 1 (ref 0.85–1.15)
LYMPHOCYTES # BLD: 1.6 K/UL (ref 1–5.1)
LYMPHOCYTES NFR BLD: 20.5 %
MAGNESIUM SERPL-MCNC: 2.02 MG/DL (ref 1.8–2.4)
MCH RBC QN AUTO: 32.9 PG (ref 26–34)
MCHC RBC AUTO-ENTMCNC: 33.9 G/DL (ref 31–36)
MCV RBC AUTO: 97.2 FL (ref 80–100)
METHGB MFR BLDV: 0 %
MONOCYTES # BLD: 0.5 K/UL (ref 0–1.3)
MONOCYTES NFR BLD: 7.1 %
NEUTROPHILS # BLD: 5.3 K/UL (ref 1.7–7.7)
NEUTROPHILS NFR BLD: 69.9 %
O2 THERAPY: ABNORMAL
PCO2 BLDV: 34.2 MMHG (ref 40–50)
PERFORMED ON: ABNORMAL
PH BLDV: 7.45 [PH] (ref 7.35–7.45)
PLATELET # BLD AUTO: 198 K/UL (ref 135–450)
PMV BLD AUTO: 9.2 FL (ref 5–10.5)
PO2 BLDV: 196 MMHG
POTASSIUM SERPL-SCNC: 3.2 MMOL/L (ref 3.5–5.1)
PROT SERPL-MCNC: 6.5 G/DL (ref 6.4–8.2)
PROTHROMBIN TIME: 13.4 SEC (ref 11.9–14.9)
RBC # BLD AUTO: 4.08 M/UL (ref 4–5.2)
SAO2 % BLDV: 99 %
SODIUM SERPL-SCNC: 140 MMOL/L (ref 136–145)
TROPONIN, HIGH SENSITIVITY: 14 NG/L (ref 0–14)
WBC # BLD AUTO: 7.6 K/UL (ref 4–11)

## 2024-10-23 PROCEDURE — 70450 CT HEAD/BRAIN W/O DYE: CPT

## 2024-10-23 PROCEDURE — 93306 TTE W/DOPPLER COMPLETE: CPT | Performed by: INTERNAL MEDICINE

## 2024-10-23 PROCEDURE — 82803 BLOOD GASES ANY COMBINATION: CPT

## 2024-10-23 PROCEDURE — 97166 OT EVAL MOD COMPLEX 45 MIN: CPT

## 2024-10-23 PROCEDURE — 6370000000 HC RX 637 (ALT 250 FOR IP)

## 2024-10-23 PROCEDURE — 85025 COMPLETE CBC W/AUTO DIFF WBC: CPT

## 2024-10-23 PROCEDURE — 97530 THERAPEUTIC ACTIVITIES: CPT

## 2024-10-23 PROCEDURE — 70498 CT ANGIOGRAPHY NECK: CPT

## 2024-10-23 PROCEDURE — G0378 HOSPITAL OBSERVATION PER HR: HCPCS

## 2024-10-23 PROCEDURE — 83735 ASSAY OF MAGNESIUM: CPT

## 2024-10-23 PROCEDURE — 85610 PROTHROMBIN TIME: CPT

## 2024-10-23 PROCEDURE — 97162 PT EVAL MOD COMPLEX 30 MIN: CPT | Performed by: PHYSICAL THERAPIST

## 2024-10-23 PROCEDURE — 70551 MRI BRAIN STEM W/O DYE: CPT

## 2024-10-23 PROCEDURE — 71045 X-RAY EXAM CHEST 1 VIEW: CPT

## 2024-10-23 PROCEDURE — 94760 N-INVAS EAR/PLS OXIMETRY 1: CPT

## 2024-10-23 PROCEDURE — 99285 EMERGENCY DEPT VISIT HI MDM: CPT

## 2024-10-23 PROCEDURE — 93005 ELECTROCARDIOGRAM TRACING: CPT | Performed by: EMERGENCY MEDICINE

## 2024-10-23 PROCEDURE — 6370000000 HC RX 637 (ALT 250 FOR IP): Performed by: EMERGENCY MEDICINE

## 2024-10-23 PROCEDURE — 82077 ASSAY SPEC XCP UR&BREATH IA: CPT

## 2024-10-23 PROCEDURE — 6360000002 HC RX W HCPCS: Performed by: HOSPITALIST

## 2024-10-23 PROCEDURE — 6370000000 HC RX 637 (ALT 250 FOR IP): Performed by: HOSPITALIST

## 2024-10-23 PROCEDURE — 80053 COMPREHEN METABOLIC PANEL: CPT

## 2024-10-23 PROCEDURE — 2580000003 HC RX 258: Performed by: HOSPITALIST

## 2024-10-23 PROCEDURE — 92610 EVALUATE SWALLOWING FUNCTION: CPT

## 2024-10-23 PROCEDURE — 96372 THER/PROPH/DIAG INJ SC/IM: CPT

## 2024-10-23 PROCEDURE — 97530 THERAPEUTIC ACTIVITIES: CPT | Performed by: PHYSICAL THERAPIST

## 2024-10-23 PROCEDURE — 84484 ASSAY OF TROPONIN QUANT: CPT

## 2024-10-23 PROCEDURE — 6360000004 HC RX CONTRAST MEDICATION: Performed by: EMERGENCY MEDICINE

## 2024-10-23 PROCEDURE — 93306 TTE W/DOPPLER COMPLETE: CPT

## 2024-10-23 PROCEDURE — 97116 GAIT TRAINING THERAPY: CPT | Performed by: PHYSICAL THERAPIST

## 2024-10-23 PROCEDURE — 93010 ELECTROCARDIOGRAM REPORT: CPT | Performed by: STUDENT IN AN ORGANIZED HEALTH CARE EDUCATION/TRAINING PROGRAM

## 2024-10-23 RX ORDER — ATORVASTATIN CALCIUM 10 MG/1
10 TABLET, FILM COATED ORAL DAILY
Status: DISCONTINUED | OUTPATIENT
Start: 2024-10-23 | End: 2024-10-25 | Stop reason: HOSPADM

## 2024-10-23 RX ORDER — LEVOTHYROXINE SODIUM 25 UG/1
25 TABLET ORAL DAILY
Status: DISCONTINUED | OUTPATIENT
Start: 2024-10-23 | End: 2024-10-25 | Stop reason: HOSPADM

## 2024-10-23 RX ORDER — PANTOPRAZOLE SODIUM 40 MG/1
40 TABLET, DELAYED RELEASE ORAL
Status: DISCONTINUED | OUTPATIENT
Start: 2024-10-23 | End: 2024-10-25 | Stop reason: HOSPADM

## 2024-10-23 RX ORDER — SERTRALINE HYDROCHLORIDE 100 MG/1
100 TABLET, FILM COATED ORAL NIGHTLY
Status: DISCONTINUED | OUTPATIENT
Start: 2024-10-23 | End: 2024-10-25 | Stop reason: HOSPADM

## 2024-10-23 RX ORDER — RIVASTIGMINE TARTRATE 6 MG/1
6 CAPSULE ORAL 2 TIMES DAILY
COMMUNITY

## 2024-10-23 RX ORDER — SODIUM CHLORIDE 0.9 % (FLUSH) 0.9 %
5-40 SYRINGE (ML) INJECTION EVERY 12 HOURS SCHEDULED
Status: DISCONTINUED | OUTPATIENT
Start: 2024-10-23 | End: 2024-10-25 | Stop reason: HOSPADM

## 2024-10-23 RX ORDER — ENOXAPARIN SODIUM 100 MG/ML
40 INJECTION SUBCUTANEOUS DAILY
Status: DISCONTINUED | OUTPATIENT
Start: 2024-10-23 | End: 2024-10-25 | Stop reason: HOSPADM

## 2024-10-23 RX ORDER — ASPIRIN 300 MG/1
300 SUPPOSITORY RECTAL DAILY
Status: DISCONTINUED | OUTPATIENT
Start: 2024-10-23 | End: 2024-10-25 | Stop reason: HOSPADM

## 2024-10-23 RX ORDER — SODIUM CHLORIDE 0.9 % (FLUSH) 0.9 %
5-40 SYRINGE (ML) INJECTION PRN
Status: DISCONTINUED | OUTPATIENT
Start: 2024-10-23 | End: 2024-10-25 | Stop reason: HOSPADM

## 2024-10-23 RX ORDER — SODIUM CHLORIDE 9 MG/ML
INJECTION, SOLUTION INTRAVENOUS PRN
Status: DISCONTINUED | OUTPATIENT
Start: 2024-10-23 | End: 2024-10-25 | Stop reason: HOSPADM

## 2024-10-23 RX ORDER — AMLODIPINE BESYLATE 5 MG/1
5 TABLET ORAL DAILY
Status: DISCONTINUED | OUTPATIENT
Start: 2024-10-23 | End: 2024-10-25

## 2024-10-23 RX ORDER — CARBIDOPA AND LEVODOPA 25; 100 MG/1; MG/1
3 TABLET ORAL 4 TIMES DAILY
Status: DISCONTINUED | OUTPATIENT
Start: 2024-10-23 | End: 2024-10-25 | Stop reason: HOSPADM

## 2024-10-23 RX ORDER — ONDANSETRON 2 MG/ML
4 INJECTION INTRAMUSCULAR; INTRAVENOUS EVERY 6 HOURS PRN
Status: DISCONTINUED | OUTPATIENT
Start: 2024-10-23 | End: 2024-10-25 | Stop reason: HOSPADM

## 2024-10-23 RX ORDER — HYDRALAZINE HYDROCHLORIDE 20 MG/ML
5 INJECTION INTRAMUSCULAR; INTRAVENOUS EVERY 4 HOURS PRN
Status: DISCONTINUED | OUTPATIENT
Start: 2024-10-23 | End: 2024-10-24

## 2024-10-23 RX ORDER — IOPAMIDOL 755 MG/ML
75 INJECTION, SOLUTION INTRAVASCULAR
Status: COMPLETED | OUTPATIENT
Start: 2024-10-23 | End: 2024-10-23

## 2024-10-23 RX ORDER — ATORVASTATIN CALCIUM 40 MG/1
40 TABLET, FILM COATED ORAL NIGHTLY
Status: DISCONTINUED | OUTPATIENT
Start: 2024-10-23 | End: 2024-10-23

## 2024-10-23 RX ORDER — ONDANSETRON 4 MG/1
4 TABLET, ORALLY DISINTEGRATING ORAL EVERY 8 HOURS PRN
Status: DISCONTINUED | OUTPATIENT
Start: 2024-10-23 | End: 2024-10-23

## 2024-10-23 RX ORDER — LABETALOL HYDROCHLORIDE 5 MG/ML
10 INJECTION, SOLUTION INTRAVENOUS EVERY 4 HOURS PRN
Status: DISCONTINUED | OUTPATIENT
Start: 2024-10-23 | End: 2024-10-24

## 2024-10-23 RX ORDER — RIVASTIGMINE TARTRATE 1.5 MG/1
3 CAPSULE ORAL 2 TIMES DAILY
Status: DISCONTINUED | OUTPATIENT
Start: 2024-10-23 | End: 2024-10-25 | Stop reason: HOSPADM

## 2024-10-23 RX ORDER — ONDANSETRON 4 MG/1
4 TABLET, FILM COATED ORAL EVERY 8 HOURS PRN
Status: DISCONTINUED | OUTPATIENT
Start: 2024-10-23 | End: 2024-10-25 | Stop reason: HOSPADM

## 2024-10-23 RX ORDER — ZINC OXIDE 20 %
1 OINTMENT (GRAM) TOPICAL 2 TIMES DAILY PRN
Status: DISCONTINUED | OUTPATIENT
Start: 2024-10-23 | End: 2024-10-25 | Stop reason: HOSPADM

## 2024-10-23 RX ORDER — LANOLIN ALCOHOL/MO/W.PET/CERES
3 CREAM (GRAM) TOPICAL NIGHTLY PRN
COMMUNITY

## 2024-10-23 RX ORDER — CEFUROXIME AXETIL 250 MG/1
250 TABLET ORAL 2 TIMES DAILY
Status: DISCONTINUED | OUTPATIENT
Start: 2024-10-23 | End: 2024-10-25 | Stop reason: HOSPADM

## 2024-10-23 RX ORDER — POTASSIUM CHLORIDE 750 MG/1
20 TABLET, EXTENDED RELEASE ORAL ONCE
Status: COMPLETED | OUTPATIENT
Start: 2024-10-23 | End: 2024-10-23

## 2024-10-23 RX ORDER — RIVASTIGMINE TARTRATE 1.5 MG/1
6 CAPSULE ORAL 2 TIMES DAILY
Status: DISCONTINUED | OUTPATIENT
Start: 2024-10-23 | End: 2024-10-23

## 2024-10-23 RX ORDER — POLYETHYLENE GLYCOL 3350 17 G/17G
17 POWDER, FOR SOLUTION ORAL DAILY PRN
Status: DISCONTINUED | OUTPATIENT
Start: 2024-10-23 | End: 2024-10-25 | Stop reason: HOSPADM

## 2024-10-23 RX ORDER — ASPIRIN 81 MG/1
81 TABLET, CHEWABLE ORAL DAILY
Status: DISCONTINUED | OUTPATIENT
Start: 2024-10-23 | End: 2024-10-25 | Stop reason: HOSPADM

## 2024-10-23 RX ADMIN — LEVOTHYROXINE SODIUM 25 MCG: 0.03 TABLET ORAL at 12:14

## 2024-10-23 RX ADMIN — CARBIDOPA AND LEVODOPA 3 TABLET: 25; 100 TABLET ORAL at 12:14

## 2024-10-23 RX ADMIN — ATORVASTATIN CALCIUM 10 MG: 10 TABLET, FILM COATED ORAL at 12:14

## 2024-10-23 RX ADMIN — PANTOPRAZOLE SODIUM 40 MG: 40 TABLET, DELAYED RELEASE ORAL at 12:14

## 2024-10-23 RX ADMIN — RIVASTIGMINE TARTRATE 6 MG: 1.5 CAPSULE ORAL at 14:48

## 2024-10-23 RX ADMIN — ASPIRIN 81 MG: 81 TABLET, CHEWABLE ORAL at 08:52

## 2024-10-23 RX ADMIN — CEFUROXIME AXETIL 250 MG: 250 TABLET ORAL at 12:14

## 2024-10-23 RX ADMIN — CEFUROXIME AXETIL 250 MG: 250 TABLET ORAL at 20:37

## 2024-10-23 RX ADMIN — ENOXAPARIN SODIUM 40 MG: 100 INJECTION SUBCUTANEOUS at 08:52

## 2024-10-23 RX ADMIN — RIVASTIGMINE TARTRATE 3 MG: 1.5 CAPSULE ORAL at 20:48

## 2024-10-23 RX ADMIN — SODIUM CHLORIDE, PRESERVATIVE FREE 10 ML: 5 INJECTION INTRAVENOUS at 20:37

## 2024-10-23 RX ADMIN — SERTRALINE 100 MG: 100 TABLET, FILM COATED ORAL at 20:37

## 2024-10-23 RX ADMIN — IOPAMIDOL 75 ML: 755 INJECTION, SOLUTION INTRAVENOUS at 01:42

## 2024-10-23 RX ADMIN — SODIUM CHLORIDE, PRESERVATIVE FREE 10 ML: 5 INJECTION INTRAVENOUS at 08:52

## 2024-10-23 RX ADMIN — CARBIDOPA AND LEVODOPA 3 TABLET: 25; 100 TABLET ORAL at 17:36

## 2024-10-23 RX ADMIN — POTASSIUM CHLORIDE 20 MEQ: 750 TABLET, EXTENDED RELEASE ORAL at 04:08

## 2024-10-23 RX ADMIN — AMLODIPINE BESYLATE 5 MG: 5 TABLET ORAL at 12:14

## 2024-10-23 RX ADMIN — ONDANSETRON 4 MG: 4 TABLET, ORALLY DISINTEGRATING ORAL at 08:51

## 2024-10-23 RX ADMIN — CARBIDOPA AND LEVODOPA 3 TABLET: 25; 100 TABLET ORAL at 20:37

## 2024-10-23 ASSESSMENT — PAIN - FUNCTIONAL ASSESSMENT: PAIN_FUNCTIONAL_ASSESSMENT: NONE - DENIES PAIN

## 2024-10-23 NOTE — PROGRESS NOTES
[facility]: [address] PROGRESSIVE CARE  DYSPHAGIA BEDSIDE SWALLOW EVALUATION     Patient: Nyla Kaur   : 1945   MRN: [id number]      Evaluation Date: 10/23/2024     Admitting Diagnosis:   TIA (transient ischemic attack) [G45.9]  Hypokalemia [E87.6]  Altered mental status, unspecified altered mental status type [R41.82]  Cerebrovascular accident (CVA) due to embolism of right middle cerebral artery (HCC) [I63.411]     has a past medical history of Anxiety, Balance disorder, GERD (gastroesophageal reflux disease), History of kidney stones, Hx of blood clots, Hyperlipidemia, Hypertension, Infection, Liver cyst, Olfactory nerve injury, Right ear injury, and Thyroid disease.   has a past surgical history that includes Abdominoplasty (); Hysterectomy (); Facial cosmetic surgery (); Ovarian cyst surgery (); Uterine fibroid surgery (); back surgery (); Gastric Band (); Colonoscopy; Facial cosmetic surgery (); Cosmetic surgery; Cholecystectomy; Endoscopy, colon, diagnostic; eye surgery; other surgical history (2015); Colonoscopy (N/A, 2019); and Cystoscopy (Left, 1/3/2023).  Allergies: medication allergies noted    Dysphagia History including instrumental assessment: seen 2024 at [facility] with rec for regular/thin  GI history: colonoscopy on record as well as gastric band  ENT history: followed for hearing loss in the past  Baseline/Prior Level of Function: regular/thin; admitted from home    Onset Date: 10/23/2024        Reason for referral: SLP evaluation orders received due to CVA protocol    RN reports concern for coughing with pills this AM                       CURRENT ENCOUNTER DIAGNOSTICS/COURSE OF ADMISSION     10/23/2024 admitted with c/o confusion. MD ADMISSION H&P HPI: \"Nyla Kaur is a 79 y.o. female with a pmh of dementia and hypertension who presents with acute confusion that started at home but had resolved by the time patient got to

## 2024-10-23 NOTE — ED TRIAGE NOTES
Pt arrived to Ed from home via [facility]. Pts  states around 0030 they were watching tv and about to go to bed when pt went unresponsive, the L side of her face started to droop and she was unable to hold her arms up. Pts  stated this has happened 3 times this year. Pt arrived to ED lethargic, pt able to move all extremities but states she didn't want to be here and was uncooperative with NIH.

## 2024-10-23 NOTE — FLOWSHEET NOTE
10/23/24 0843   Treatment Team Notification   Reason for Communication Evaluate  (K 3.2, orders to treat?)   Name of Team Member Notified Dr. Bell   Treatment Team Role Attending Provider   Method of Communication Secure Message   Response Waiting for response   Notification Time 0843

## 2024-10-23 NOTE — CARE COORDINATION
Case Management Assessment  Initial Evaluation    Date/Time of Evaluation: 10/23/2024 3:24 PM  Assessment Completed by: Shahnaz Mullins RN    If patient is discharged prior to next notation, then this note serves as note for discharge by case management.    Patient Name: Nyla Kaur                   YOB: 1945  Diagnosis: TIA (transient ischemic attack) [G45.9]  Hypokalemia [E87.6]  Altered mental status, unspecified altered mental status type [R41.82]  Cerebrovascular accident (CVA) due to embolism of right middle cerebral artery (HCC) [I63.411]                   Date / Time: 10/23/2024  1:25 AM    Patient Admission Status: Observation   Readmission Risk (Low < 19, Mod (19-27), High > 27): No data recorded  Current PCP: Kendra Acevedo, DO  PCP verified by CM? Yes    Chart Reviewed: Yes      History Provided by: Patient  Patient Orientation: Alert and Oriented    Patient Cognition: Alert    Hospitalization in the last 30 days (Readmission):  No    If yes, Readmission Assessment in  Navigator will be completed.    Advance Directives:      Code Status: Full Code   Patient's Primary Decision Maker is: Legal Next of Kin    Primary Decision Maker: Julián Kaur - Spouse - [phone]    Discharge Planning:    Patient lives with: Spouse/Significant Other Type of Home: House  Primary Care Giver: Family  Patient Support Systems include: Spouse/Significant Other, Children, Family Members   Current Financial resources: Medicare  Current community resources: Other (Comment) (Active with [employer])  Current services prior to admission: Durable Medical Equipment            Current DME: Cane, [employer]            Type of Home Care services:  None    ADLS  Prior functional level: Assistance with the following:, Cooking, Housework, Shopping, Mobility  Current functional level: Assistance with the following:, Cooking, Housework, Shopping, Mobility    PT AM-PAC: 17 /24  OT AM-PAC: 17 /24    Family can provide  assistance at DC: Yes  Would you like Case Management to discuss the discharge plan with any other family members/significant others, and if so, who? Yes (spouse if needed)  Plans to Return to Present Housing: Unknown at present  Other Identified Issues/Barriers to RETURNING to current housing: increased needs  Potential Assistance needed at discharge: N/A            Potential DME:  None  Patient expects to discharge to: House  Plan for transportation at discharge: Family    Financial    Payor: [employer] MEDICARE / Plan: [employer] MEDICARE-ADVANTAGE PPO / Product Type: Medicare /     Does insurance require precert for SNF: Yes    Potential assistance Purchasing Medications: No  Meds-to-Beds request:        [facility] [id number] - [place], [place] - [address]  PATRICE [phone] - F [phone] [address]  [facility] [address]  Phone: [phone] Fax: [phone]      Notes:    Factors facilitating achievement of predicted outcomes: Family support, Motivated, Cooperative, Pleasant, and Good insight into deficits    Barriers to discharge: Decreased endurance and Long standing deficits    Additional Case Management Notes:   DC plan to return home with .   will transport.  Active with [employer].  Patient also receives a [facility] through [facility].    The Plan for Transition of Care is related to the following treatment goals of TIA (transient ischemic attack) [G45.9]  Hypokalemia [E87.6]  Altered mental status, unspecified altered mental status type [R41.82]  Cerebrovascular accident (CVA) due to embolism of right middle cerebral artery (HCC) [I63.411]    IF APPLICABLE: The Patient and/or patient representative Nyla and her family were provided with a choice of provider and agrees with the discharge plan. Freedom of choice list with basic dialogue that supports the patient's individualized plan of care/goals and shares the quality data associated with the providers was provided to: Patient       The Patient

## 2024-10-23 NOTE — FLOWSHEET NOTE
10/23/24 1522   Treatment Team Notification   Reason for Communication Evaluate;Medication concern;Patient/Family request  (family at bedside, discrepancy with Rivastigimine. [employer] confirmed patient takes 6mg BID,  stated bottle at home says 3mg BID. No emesis/vagaling since this am.)   Name of Team Member Notified Dr. Bell   Treatment Team Role Attending Provider   Method of Communication Secure Message   Response Waiting for response   Notification Time 2977

## 2024-10-23 NOTE — H&P
V2.0  History and Physical      Name:  Nyla Kaur /Age/Sex: 1945  (79 y.o. female)   MRN & CSN:  [id number] & [id number] Encounter Date/Time: 10/23/2024 6:37 AM EDT   Location:  [id number] PCP: Kendra Acevedo DO       Hospital Day: 1    Assessment and Plan:   Nyla Kaur is a 79 y.o. female with a pmh of dementia and hypertension who presents with TIA (transient ischemic attack)    Hospital Problems             Last Modified POA    * (Principal) TIA (transient ischemic attack) 10/23/2024 Yes    Primary hypertension 10/23/2024 Yes    Mild late onset Alzheimer's dementia without behavioral disturbance, psychotic disturbance, mood disturbance, or anxiety (HCC) 10/23/2024 Yes    Hypokalemia 10/23/2024 Yes       Plan:  1.Serial NINDS NIH Scale ordered  2.Lipid profile and A1c ordered.  3.Physical therapy, and occupational therapy to work with patient.  N.p.o. until bedside swallow eval.  4.Daily aspirin.  5.High intensity statin  6.Permissive hypertension.  Control blood pressure with labetalol for systolic blood pressure of more than 220 or diastolic blood pressure of more than 120.  7.MRI of head; brain; and neck.  8.Echocardiogram ordered.  9.  Hypokalemia-replaced at the ED.  Trend  Advanced care planning was discussed with patient for a total of 16 minutes.  Full code, DNR CC, DNR CCA, power of  and living will were discussed. Patient elected to be a full code.  Disposition:   Current Living situation: Home  Expected Disposition: Home  Estimated D/C: 2 days    Diet Diet NPO.  If passes follow eval will be on cardiac diet.   DVT Prophylaxis [x] Lovenox, []  Heparin, [] SCDs, [] Ambulation,  [] Eliquis, [] Xarelto, [] Coumadin   Code Status Full code   Surrogate Decision Maker/ POA      Personally reviewed Lab Studies and Imaging     Discussed management of the case with  ED provider who recommended admission    EKG interpreted personally and results sinus rhythm with no ST

## 2024-10-23 NOTE — FLOWSHEET NOTE
10/23/24 1158   Treatment Team Notification   Reason for Communication Evaluate;Abnormal vitals  (elevated BP, MRI results, patient takes sinemet at home)   Name of Team Member Notified Dr. Bell   Treatment Team Role Attending Provider   Method of Communication Secure Message   Response Waiting for response   Notification Time 1708

## 2024-10-23 NOTE — PROGRESS NOTES
NAME:  Nyla Karu  YOB: 1945  MEDICAL RECORD NUMBER:  [id number]  TODAYS DATE:  10/23/2024    Discussed personal risk factors for Stroke/TIA with patient/family, and ways to reduce the risk for a recurrent stroke. Patient's personal risk factors which were identified are:     [] Alcohol Abuse: check with your physician before any alcohol consumption.   [] Atrial fibrillation: may cause blood clots.  [] Drug Abuse: Seek help, talk with your doctor  [] Clotting Disorder  [] Diabetes  [] Family history of stroke or heart disease  [x] High Blood Pressure/Hypertension: work with your physician.  [x] High cholesterol: monitor cholesterol levels with your physician.   [] Overweight/Obesity: work with your physician for your ideal body weight.  [x] Physical Inactivity: get regular exercise as directed by your physician.   [] Personal history of previous TIA or stroke  [x] Poor Diet; decrease salt (sodium) in your diet, follow diet directed by physician.   [] Smoking: Cigarette/Cigar: stop smoking.         Advised pt. that you can reduce your risk for stroke/TIA by modifying/controlling the risk factors that you have. Pt.advised to take the medications as prescribed, which will be detailed in the discharge instructions, and to not stop taking them without consulting their physician. In addition, pt. advised to maintain a healthy diet, exercise regularly and to not smoke.      [facility]'s Stroke treatment and prevention, Managing your recovery  notebook  provided and/or reviewed  with patient/family.  The notebook includes, but not limited to, sections addressing warning signs & symptoms of a stroke, which are: sudden numbness or weakness especially on one side of the body, sudden confusion, difficulty speaking or understanding, sudden changes in vision, sudden dizziness or loss of balance/ coordination, sudden severe headache, syncope and seizure.  The need to call EMS (911) immediately if signs &

## 2024-10-23 NOTE — ED NOTES
Pts  requesting that patient have something for constipation. States she has only had small bowel movements for the past 10 days. Pts  requesting something other than miralax due to that ot working well for her in past.

## 2024-10-23 NOTE — PROGRESS NOTES
Occupational Therapy  Facility/Department: [address] PROGRESSIVE CARE  Occupational Therapy Initial Assessment    Name: Nyla Kaur  : 1945  MRN: [id number]  Date of Service: 10/23/2024    Discharge Recommendations:  24 hour supervision or assist, Home with Home health OT ( and home OT vs SNF pending family availability)  OT Equipment Recommendations  Equipment Needed: No     Nyla Kaur scored a  on the [facility] ADL Inpatient form.        Patient Diagnosis(es): The primary encounter diagnosis was Altered mental status, unspecified altered mental status type. Diagnoses of TIA (transient ischemic attack), Hypokalemia, and Cerebrovascular accident (CVA) due to embolism of right middle cerebral artery (HCC) were also pertinent to this visit.  Past Medical History:  has a past medical history of Anxiety, Balance disorder, GERD (gastroesophageal reflux disease), History of kidney stones, Hx of blood clots, Hyperlipidemia, Hypertension, Infection, Liver cyst, Olfactory nerve injury, Right ear injury, and Thyroid disease.  Past Surgical History:  has a past surgical history that includes Abdominoplasty (); Hysterectomy (); Facial cosmetic surgery (); Ovarian cyst surgery (); Uterine fibroid surgery (); back surgery (); Gastric Band (); Colonoscopy; Facial cosmetic surgery (); Cosmetic surgery; Cholecystectomy; Endoscopy, colon, diagnostic; eye surgery; other surgical history (2015); Colonoscopy (N/A, 2019); and Cystoscopy (Left, 1/3/2023).           Assessment  Performance deficits / Impairments: Decreased functional mobility ;Decreased cognition;Decreased high-level IADLs;Decreased ADL status;Decreased endurance;Decreased strength;Decreased balance;Decreased safe awareness  Assessment: 78 y/o female admitted 10/23/2024 with TIA. PTA pt reports living at home with spouse and ind with ADLs and functional mobility. Today, pt questionnable historian. Pt  Access: Stairs to enter without rails  Entrance Stairs - Number of Steps: 1 small step to enter  Bathroom Shower/Tub: Walk-in shower  Bathroom Toilet: Standard  Bathroom Equipment: Built-in shower seat, Grab bars in shower  Home Equipment: Cane, Walker - Rolling  Has the patient had two or more falls in the past year or any fall with injury in the past year?:  (reports 1 fall last week and fractured rib)  ADL Assistance: Independent  Homemaking Assistance:  (spouse primarily completes)  Ambulation Assistance: Independent (no AD- reports spouse is close by)  Transfer Assistance: Independent  Active : No  Occupation: Retired  Additional Comments: Pt providing conflicting information at times- unsure accuracy.    Objective  Safety Devices  Type of Devices: Call light within reach;Chair alarm in place;Left in chair;Nurse notified;All gracia prominences offloaded;Gait belt;Patient at risk for falls           ADL  LE Dressing: Maximum assistance  LE Dressing Skilled Clinical Factors: assist to chuckie adult brief sitting EOB  Functional Mobility: Minimal assistance  Functional Mobility Skilled Clinical Factors: Pt ambulated ~15 ft with hand hold assist and min A. Shuffling gait and cuing for safety  Additional Comments: Anticipate will be mod A for toileting, SBA for UB bathing/dressing and grooming when seated based on balance and endurance observed  Skin Care: Bath wipes;Incontinent cleanser     Activity Tolerance  Activity Tolerance: Patient tolerated evaluation without incident;Treatment limited secondary to decreased cognition    Bed mobility  Supine to Sit: Moderate assistance (HOB partially elevated)  Bed Mobility Comments: up in chair at end of session    Transfers  Sit to stand: Minimal assistance  Stand to sit: Minimal assistance  Transfer Comments: stood from bed and chair- hand hold assist to initiate movement    Vision  Vision: Within Functional Limits  Hearing  Hearing: Exceptions to WFL  Hearing

## 2024-10-23 NOTE — ED PROVIDER NOTES
[facility] EMERGENCY DEPARTMENT    CHIEF COMPLAINT  Altered Mental Status (PT wasn't responding, has had episodes like this before, 3 of these episodes this year.)       HISTORY OF PRESENT ILLNESS  Nyla Kaur is a 79 y.o. female with history of Parkinson who presents to the ED with altered mental status.  Last known well time 1230.  Has been noticed that she was not responsive and called EMS.  Upon EMS initial assessment, seem to have left-sided facial droop and slurred speech.  Her level of alertness improved en route for EMS and left-sided facial droop improved and speech normalized.  Blood pressure prehospital 180 systolic, glucose 190.  Prehospital twelve-lead unremarkable.  Patient is somewhat drowsy but will awaken to stimuli and does not have any complaints.    Patient's  states that she had some similar episodes in January and was admitted at [facility] and no cause was found for her symptoms.  States she is under a lot of stress.  Mentions she had dental work done yesterday.  Also states that it has been about 8 days since she has had a substantial bowel movement. She has had several small bowel movements.     I have reviewed the following from the nursing documentation:    Past Medical History:   Diagnosis Date    Anxiety     Balance disorder     unknown etiology    GERD (gastroesophageal reflux disease)     History of kidney stones     Hx of blood clots     x 1 from use of BCPs    Hyperlipidemia     Hypertension     Infection 11/2015    gastric band    Liver cyst     \"possible\"    Olfactory nerve injury     mostly resolved    Right ear injury 20 yrs ago    Thyroid disease      Past Surgical History:   Procedure Laterality Date    ABDOMINOPLASTY  2012    also cosmetic surgery on arms    BACK SURGERY  1998    exc. cyst lumbar    CHOLECYSTECTOMY      COLONOSCOPY      COLONOSCOPY N/A 8/19/2019    COLONOSCOPY DIAGNOSTIC performed by Brandt Amin MD at [facility] ENDOSCOPY

## 2024-10-23 NOTE — PLAN OF CARE
Problem: Discharge Planning  Goal: Discharge to home or other facility with appropriate resources  Outcome: Progressing  Flowsheets  Taken 10/23/2024 0555  Discharge to home or other facility with appropriate resources: Identify barriers to discharge with patient and caregiver  Taken 10/23/2024 0528  Discharge to home or other facility with appropriate resources: Identify barriers to discharge with patient and caregiver     Problem: Neurosensory - Adult  Goal: Achieves stable or improved neurological status  Outcome: Progressing  Flowsheets (Taken 10/23/2024 0653)  Achieves stable or improved neurological status: Assess for and report changes in neurological status     Problem: Respiratory - Adult  Goal: Achieves optimal ventilation and oxygenation  Outcome: Progressing  Flowsheets (Taken 10/23/2024 0653)  Achieves optimal ventilation and oxygenation:   Assess for changes in respiratory status   Assess for changes in mentation and behavior     Problem: Cardiovascular - Adult  Goal: Maintains optimal cardiac output and hemodynamic stability  Outcome: Progressing  Flowsheets (Taken 10/23/2024 0653)  Maintains optimal cardiac output and hemodynamic stability:   Monitor blood pressure and heart rate   Monitor urine output and notify Licensed Independent Practitioner for values outside of normal range     Problem: Skin/Tissue Integrity - Adult  Goal: Skin integrity remains intact  Outcome: Progressing  Flowsheets (Taken 10/23/2024 0653)  Skin Integrity Remains Intact: Monitor for areas of redness and/or skin breakdown     Problem: Musculoskeletal - Adult  Goal: Return mobility to safest level of function  Outcome: Progressing  Flowsheets (Taken 10/23/2024 0653)  Return Mobility to Safest Level of Function:   Assess patient stability and activity tolerance for standing, transferring and ambulating with or without assistive devices   Assist with transfers and ambulation using safe patient handling equipment as needed

## 2024-10-23 NOTE — CONSULTS
39.6  Platelet: 198      Studies  MRI Brain w/o 10/23/24:   IMPRESSION:  1. No acute intracranial abnormality.  No acute infarct.  2. Mild-to-moderate global parenchymal volume loss with moderate chronic  microvascular ischemic changes.    CT Head w/o 10/23/24:  IMPRESSION:  No acute intracranial abnormality.    CTA Head & Neck w/ 10/23/24:  IMPRESSION:  No acute arterial abnormality or hemodynamically significant arterial  stenosis in the head or neck.    EKG  10/23/24      Normal sinus rhythmPossible Left atrial enlargementIncomplete right bundle branch blockLeft anterior fascicular blockLeft ventricular hypertrophy with repolarization abnormality ( R in aVL , Gilberto product , Romhilt-Reyes )Abnormal ECGWhen compared with ECG of 18-OCT-2024 11:46,No significant change was found        Routine EEG 1/9/24: prior to admission. Care Everywhere.   IMPRESSION:  Electroencephalogram is essentially normal.     TTE 1/8/24 Prior to admission. Care Everywhere.   - Left ventricle: This was a technically difficult study. The cavity size is     normal. Wall thickness is normal. Systolic function is normal. The     estimated ejection fraction is 68%, by biplane method of disks. Wall     motion is normal; there are no regional wall motion abnormalities. Normal     diastolic function.   - Aortic valve: The valve is structurally normal. The valve area index is     1.28cm^2/m^2.   - Mitral valve: The valve is structurally normal.   - Right ventricle: The cavity size is normal. Wall thickness is normal.     Systolic function is normal.   - Right atrium: The estimated central venous pressure is 3mm Hg.   - Atrial septum: Agitated saline contrast study was attempted however     nondiagnostic due to poor imaging windows.   - Tricuspid valve: The valve is structurally normal. There is mild     regurgitation.   - Inferior vena cava: The IVC is normal-sized. Respirophasic diameter     changes are in the normal range (> 50%), consistent

## 2024-10-23 NOTE — PROGRESS NOTES
Pt arrived via stretcher from ED to room 5132.  Heart monitor connected and verified with [facility]. VS, assessment, and admission complete. 4 eyes assessment complete. Pt oriented to unit and room. Call light and bedside table in reach. All questions answered. Pt resting quietly in bed with no complaints or voiced needs at this time.     Electronically signed by Osvaldo Boo RN on 10/23/2024 at 6:59 AM

## 2024-10-23 NOTE — ED TRIAGE NOTES
PT wasn't responding, has had episodes like this before, 3 of these episodes this year.     Pt to CT

## 2024-10-23 NOTE — PROGRESS NOTES
Physical Therapy  Facility/Department: [address] PROGRESSIVE CARE  Physical Therapy Initial Assessment    Name: Nyla Kaur  : 1945  MRN: [id number]  Date of Service: 10/23/2024    Discharge Recommendations:  Subacute/Skilled Nursing Facility, 24 hour supervision or assist, Home with Home health PT   PT Equipment Recommendations  Equipment Needed: No      Nyla Kaur scored a 17/24 on the AM-PAC short mobility form. Current research shows that an AM-PAC score of 18 or greater is typically associated with a discharge to the patient's home setting. At this time, if  can provide 24/7 assist then she would likely be able to return home with home PT.  However if he is not able to provide assist pt needs, then SNF would be more appropriate as pt is a high fall risk.  Please see assessment section for further patient specific details.    If patient discharges prior to next session this note will serve as a discharge summary.  Please see below for the latest assessment towards goals.       Patient Diagnosis(es): The primary encounter diagnosis was Altered mental status, unspecified altered mental status type. Diagnoses of TIA (transient ischemic attack), Hypokalemia, and Cerebrovascular accident (CVA) due to embolism of right middle cerebral artery (HCC) were also pertinent to this visit.  Past Medical History:  has a past medical history of Anxiety, Balance disorder, GERD (gastroesophageal reflux disease), History of kidney stones, Hx of blood clots, Hyperlipidemia, Hypertension, Infection, Liver cyst, Olfactory nerve injury, Right ear injury, and Thyroid disease.  Past Surgical History:  has a past surgical history that includes Abdominoplasty (); Hysterectomy (); Facial cosmetic surgery (); Ovarian cyst surgery (); Uterine fibroid surgery (); back surgery (); Gastric Band (); Colonoscopy; Facial cosmetic surgery ([id number]); Cosmetic surgery; Cholecystectomy; Endoscopy,

## 2024-10-24 LAB
ANION GAP SERPL CALCULATED.3IONS-SCNC: 7 MMOL/L (ref 3–16)
BUN SERPL-MCNC: 15 MG/DL (ref 7–20)
CALCIUM SERPL-MCNC: 9.9 MG/DL (ref 8.3–10.6)
CHLORIDE SERPL-SCNC: 105 MMOL/L (ref 99–110)
CHOLEST SERPL-MCNC: 137 MG/DL (ref 0–199)
CO2 SERPL-SCNC: 30 MMOL/L (ref 21–32)
CREAT SERPL-MCNC: 0.6 MG/DL (ref 0.6–1.2)
DEPRECATED RDW RBC AUTO: 12.7 % (ref 12.4–15.4)
EST. AVERAGE GLUCOSE BLD GHB EST-MCNC: 111.2 MG/DL
GFR SERPLBLD CREATININE-BSD FMLA CKD-EPI: >90 ML/MIN/{1.73_M2}
GLUCOSE BLD-MCNC: 124 MG/DL (ref 70–99)
GLUCOSE SERPL-MCNC: 97 MG/DL (ref 70–99)
HBA1C MFR BLD: 5.5 %
HCT VFR BLD AUTO: 40.3 % (ref 36–48)
HDLC SERPL-MCNC: 47 MG/DL (ref 40–60)
HGB BLD-MCNC: 13.6 G/DL (ref 12–16)
LDLC SERPL CALC-MCNC: 72 MG/DL
MAGNESIUM SERPL-MCNC: 2.04 MG/DL (ref 1.8–2.4)
MCH RBC QN AUTO: 32.7 PG (ref 26–34)
MCHC RBC AUTO-ENTMCNC: 33.7 G/DL (ref 31–36)
MCV RBC AUTO: 97.1 FL (ref 80–100)
PERFORMED ON: ABNORMAL
PLATELET # BLD AUTO: 214 K/UL (ref 135–450)
PMV BLD AUTO: 9.1 FL (ref 5–10.5)
POTASSIUM SERPL-SCNC: 3.4 MMOL/L (ref 3.5–5.1)
RBC # BLD AUTO: 4.15 M/UL (ref 4–5.2)
SODIUM SERPL-SCNC: 142 MMOL/L (ref 136–145)
TRIGL SERPL-MCNC: 90 MG/DL (ref 0–150)
VLDLC SERPL CALC-MCNC: 18 MG/DL
WBC # BLD AUTO: 7.4 K/UL (ref 4–11)

## 2024-10-24 PROCEDURE — 36415 COLL VENOUS BLD VENIPUNCTURE: CPT

## 2024-10-24 PROCEDURE — 97530 THERAPEUTIC ACTIVITIES: CPT | Performed by: PHYSICAL THERAPIST

## 2024-10-24 PROCEDURE — 83735 ASSAY OF MAGNESIUM: CPT

## 2024-10-24 PROCEDURE — 92526 ORAL FUNCTION THERAPY: CPT

## 2024-10-24 PROCEDURE — 97535 SELF CARE MNGMENT TRAINING: CPT

## 2024-10-24 PROCEDURE — 2580000003 HC RX 258: Performed by: HOSPITALIST

## 2024-10-24 PROCEDURE — G0378 HOSPITAL OBSERVATION PER HR: HCPCS

## 2024-10-24 PROCEDURE — 80061 LIPID PANEL: CPT

## 2024-10-24 PROCEDURE — 85027 COMPLETE CBC AUTOMATED: CPT

## 2024-10-24 PROCEDURE — 96372 THER/PROPH/DIAG INJ SC/IM: CPT

## 2024-10-24 PROCEDURE — 80048 BASIC METABOLIC PNL TOTAL CA: CPT

## 2024-10-24 PROCEDURE — 83036 HEMOGLOBIN GLYCOSYLATED A1C: CPT

## 2024-10-24 PROCEDURE — 97116 GAIT TRAINING THERAPY: CPT | Performed by: PHYSICAL THERAPIST

## 2024-10-24 PROCEDURE — 94760 N-INVAS EAR/PLS OXIMETRY 1: CPT

## 2024-10-24 PROCEDURE — 95819 EEG AWAKE AND ASLEEP: CPT

## 2024-10-24 PROCEDURE — 9990000010 HC NO CHARGE VISIT: Performed by: PHYSICAL THERAPIST

## 2024-10-24 PROCEDURE — 97530 THERAPEUTIC ACTIVITIES: CPT

## 2024-10-24 PROCEDURE — 96374 THER/PROPH/DIAG INJ IV PUSH: CPT

## 2024-10-24 PROCEDURE — 6370000000 HC RX 637 (ALT 250 FOR IP): Performed by: HOSPITALIST

## 2024-10-24 PROCEDURE — 6370000000 HC RX 637 (ALT 250 FOR IP)

## 2024-10-24 PROCEDURE — 6360000002 HC RX W HCPCS: Performed by: HOSPITALIST

## 2024-10-24 RX ORDER — LABETALOL HYDROCHLORIDE 5 MG/ML
10 INJECTION, SOLUTION INTRAVENOUS EVERY 4 HOURS PRN
Status: DISCONTINUED | OUTPATIENT
Start: 2024-10-24 | End: 2024-10-25 | Stop reason: HOSPADM

## 2024-10-24 RX ORDER — HYDRALAZINE HYDROCHLORIDE 20 MG/ML
5 INJECTION INTRAMUSCULAR; INTRAVENOUS EVERY 4 HOURS PRN
Status: DISCONTINUED | OUTPATIENT
Start: 2024-10-24 | End: 2024-10-25 | Stop reason: HOSPADM

## 2024-10-24 RX ORDER — VALSARTAN 80 MG/1
80 TABLET ORAL DAILY
Status: DISCONTINUED | OUTPATIENT
Start: 2024-10-24 | End: 2024-10-25 | Stop reason: HOSPADM

## 2024-10-24 RX ORDER — POTASSIUM CHLORIDE 1500 MG/1
40 TABLET, EXTENDED RELEASE ORAL ONCE
Status: DISCONTINUED | OUTPATIENT
Start: 2024-10-24 | End: 2024-10-24

## 2024-10-24 RX ADMIN — ENOXAPARIN SODIUM 40 MG: 100 INJECTION SUBCUTANEOUS at 07:57

## 2024-10-24 RX ADMIN — RIVASTIGMINE TARTRATE 3 MG: 1.5 CAPSULE ORAL at 20:50

## 2024-10-24 RX ADMIN — AMLODIPINE BESYLATE 5 MG: 5 TABLET ORAL at 07:57

## 2024-10-24 RX ADMIN — LEVOTHYROXINE SODIUM 25 MCG: 0.03 TABLET ORAL at 05:19

## 2024-10-24 RX ADMIN — CARBIDOPA AND LEVODOPA 3 TABLET: 25; 100 TABLET ORAL at 13:33

## 2024-10-24 RX ADMIN — CARBIDOPA AND LEVODOPA 3 TABLET: 25; 100 TABLET ORAL at 17:22

## 2024-10-24 RX ADMIN — ASPIRIN 81 MG: 81 TABLET, CHEWABLE ORAL at 07:57

## 2024-10-24 RX ADMIN — CEFUROXIME AXETIL 250 MG: 250 TABLET ORAL at 20:50

## 2024-10-24 RX ADMIN — RIVASTIGMINE TARTRATE 3 MG: 1.5 CAPSULE ORAL at 07:58

## 2024-10-24 RX ADMIN — POTASSIUM BICARBONATE 40 MEQ: 391 TABLET, EFFERVESCENT ORAL at 11:26

## 2024-10-24 RX ADMIN — PANTOPRAZOLE SODIUM 40 MG: 40 TABLET, DELAYED RELEASE ORAL at 05:19

## 2024-10-24 RX ADMIN — ATORVASTATIN CALCIUM 10 MG: 10 TABLET, FILM COATED ORAL at 07:57

## 2024-10-24 RX ADMIN — CARBIDOPA AND LEVODOPA 3 TABLET: 25; 100 TABLET ORAL at 20:50

## 2024-10-24 RX ADMIN — SODIUM CHLORIDE, PRESERVATIVE FREE 10 ML: 5 INJECTION INTRAVENOUS at 07:57

## 2024-10-24 RX ADMIN — CARBIDOPA AND LEVODOPA 3 TABLET: 25; 100 TABLET ORAL at 07:57

## 2024-10-24 RX ADMIN — SERTRALINE 100 MG: 100 TABLET, FILM COATED ORAL at 20:50

## 2024-10-24 RX ADMIN — SODIUM CHLORIDE, PRESERVATIVE FREE 10 ML: 5 INJECTION INTRAVENOUS at 20:50

## 2024-10-24 RX ADMIN — CEFUROXIME AXETIL 250 MG: 250 TABLET ORAL at 07:57

## 2024-10-24 RX ADMIN — HYDRALAZINE HYDROCHLORIDE 5 MG: 20 INJECTION INTRAMUSCULAR; INTRAVENOUS at 05:38

## 2024-10-24 ASSESSMENT — PAIN SCALES - GENERAL
PAINLEVEL_OUTOF10: 0

## 2024-10-24 NOTE — PROGRESS NOTES
Resting in bed. NIH 0 at this time. Denies headache or dizziness. Incontinence care provided. Tolerated well. Turned and repositioned. Safety precautions in place.

## 2024-10-24 NOTE — PROGRESS NOTES
cognition    Bed mobility  Supine to Sit: Minimal assistance  Bed Mobility Comments: up in chair at end of session     Vision  Vision: Within Functional Limits  Hearing  Hearing: Exceptions to WFL  Hearing Exceptions: Hard of hearing/hearing concerns    Cognition  Overall Cognitive Status: Exceptions  Arousal/Alertness: Appears intact  Following Commands: Follows one step commands consistently;Follows one step commands with increased time  Attention Span: Attends with cues to redirect  Safety Judgement: Decreased awareness of need for safety  Insights: Impaired  Initiation: Requires cues for some  Orientation  Overall Orientation Status: Within Functional Limits                  Education Given To: Patient;Family  Education Provided: Role of Therapy;Plan of Care;Transfer Training  Education Method: Verbal;Demonstration  Barriers to Learning: Cognition  Education Outcome: Continued education needed      AM-PAC - ADL  AM-PAC Daily Activity - Inpatient   How much help is needed for putting on and taking off regular lower body clothing?: A Lot  How much help is needed for bathing (which includes washing, rinsing, drying)?: A Little  How much help is needed for toileting (which includes using toilet, bedpan, or urinal)?: A Little  How much help is needed for putting on and taking off regular upper body clothing?: A Little  How much help is needed for taking care of personal grooming?: A Little  How much help for eating meals?: None  AM-PAC Inpatient Daily Activity Raw Score: 18  AM-PAC Inpatient ADL T-Scale Score : 38.66  ADL Inpatient CMS 0-100% Score: 46.65  ADL Inpatient CMS G-Code Modifier : CK      Goals  Short Term Goals  Time Frame for Short Term Goals: Prior to DC: goals ongoing  Short Term Goal 1: Pt will complete ADL transfer/mobility with supervision  Short Term Goal 2: Pt will tolerate standing > 5 min for functional task with supervision  Short Term Goal 3: Pt will complete LB Dressing with

## 2024-10-24 NOTE — PROGRESS NOTES
Physical Therapy      Nyla Kaur  [id number]  [id number]    Attempted to see for PT session however pt is currently having diagnostic testing in her room.  Will attempt later as schedule permits  Electronically signed by AMANDA CRYSTAL PT on 10/24/2024 at 12:18 PM

## 2024-10-24 NOTE — PROGRESS NOTES
Name:  Nyla Kaur /Age/Sex: 1945  (79 y.o. female)   MRN & CSN:  [id number] & [id number] Encounter Date/Time: 10/24/2024 2:10 PM EDT   Location:  [id number] PCP: Kendra Acevedo DO     Attending:Zita Bell MD       Nyla Kuar is a 79 y.o. female with  has a past medical history of Anxiety, Balance disorder, GERD (gastroesophageal reflux disease), History of kidney stones, Hx of blood clots, Hyperlipidemia, Hypertension, Infection, Liver cyst, Olfactory nerve injury, Right ear injury, and Thyroid disease. who presents with TIA (transient ischemic attack)    Hospital Day: 2      Interval history:     Seen and examined at bedside. No acute events overnight. Reports feeling tired, denies any other complaints    Review of Systems:      10 point ROS negative except stated above    Objective:     Intake/Output Summary (Last 24 hours) at 10/24/2024 1410  Last data filed at 10/24/2024 1042  Gross per 24 hour   Intake 360 ml   Output --   Net 360 ml      Vitals:   Vitals:    10/24/24 0803 10/24/24 1023 10/24/24 1125 10/24/24 1200   BP: (!) 148/76 (!) 157/67 (!) 141/81 (!) 141/81   Pulse: 63 79 79    Resp: 18 21 22    Temp:   98.5 °F (36.9 °C)    TempSrc: Oral  Oral    SpO2: 97%      Weight:       Height:             Physical Exam:        General: NAD  Eyes: EOMI  ENT: neck supple  Cardiovascular: Regular rate.  Respiratory: Clear to auscultation  Gastrointestinal: Soft, non tender  Genitourinary: no suprapubic tenderness  Musculoskeletal: No edema  Neuro: Alert, no focal weakness  Psych: Mood appropriate.       Assessment and Plan     TIA : Episode of unresponsiveness with slurred speech which has now resolved. CT head negative. MRI negative. On aspirin and statin for now. Echo negative for any embolic source. Plan for EEG today. Neurology following    Hypertension : Blood pressure elevated but trending down. Will monitor    Parkison disease  : Continue home medications     DVT prophylaxis :  venous sinus thrombosis on this non-dedicated study. BRAIN: No mass effect or midline shift. No extra-axial fluid collection. The gray-white differentiation is maintained.     No acute arterial abnormality or hemodynamically significant arterial stenosis in the head or neck.     CT HEAD WO CONTRAST    Addendum Date: 10/23/2024    ADDENDUM: Critical results were called by Huber Izaguirre to Dr. Shavonne Ford on 10/23/2024 at 02:02.     Result Date: 10/23/2024  EXAMINATION: CT OF THE HEAD WITHOUT CONTRAST  10/23/2024 1:30 am TECHNIQUE: CT of the head was performed without the administration of intravenous contrast. Automated exposure control, iterative reconstruction, and/or weight based adjustment of the mA/kV was utilized to reduce the radiation dose to as low as reasonably achievable. COMPARISON: CT brain 10/18/2024. HISTORY: ORDERING SYSTEM PROVIDED HISTORY: Stroke TECHNOLOGIST PROVIDED HISTORY: Has a \"code stroke\" or \"stroke alert\" been called?->Yes Reason for exam:->Stroke Decision Support Exception - unselect if not a suspected or confirmed emergency medical condition->Emergency Medical Condition (MA) Reason for Exam: ams code stroke FINDINGS: BRAIN/VENTRICLES: There is no acute intracranial hemorrhage, mass effect or midline shift.  No abnormal extra-axial fluid collection.  The gray-white differentiation is maintained without evidence of an acute infarct.  There is no evidence of hydrocephalus. Moderate generalized parenchymal volume loss and chronic periventricular white matter hypoattenuation are seen.  Chronic lacunar infarctions in the right basal ganglia and caudate head. ORBITS: The visualized portion of the orbits demonstrate no acute abnormality. SINUSES: The visualized paranasal sinuses and mastoid air cells demonstrate no acute abnormality. SOFT TISSUES/SKULL:  No acute abnormality of the visualized skull or soft tissues.     No acute intracranial abnormality. The findings were sent to the

## 2024-10-24 NOTE — PLAN OF CARE
Problem: Discharge Planning  Goal: Discharge to home or other facility with appropriate resources  10/24/2024 0149 by Jalil Zepeda RN  Outcome: Progressing     Problem: Neurosensory - Adult  Goal: Achieves stable or improved neurological status  10/24/2024 0149 by Jalil Zepeda RN  Outcome: Progressing     Problem: Respiratory - Adult  Goal: Achieves optimal ventilation and oxygenation  10/24/2024 0149 by Jalil Zepeda RN  Outcome: Progressing     Problem: Cardiovascular - Adult  Goal: Maintains optimal cardiac output and hemodynamic stability  10/24/2024 0149 by Jalil Zepeda RN  Outcome: Progressing     Problem: Skin/Tissue Integrity - Adult  Goal: Skin integrity remains intact  10/24/2024 0149 by Jalil Zepeda RN  Outcome: Progressing     Problem: Musculoskeletal - Adult  Goal: Return mobility to safest level of function  10/24/2024 0149 by Jalil Zepeda RN  Outcome: Progressing     Problem: Gastrointestinal - Adult  Goal: Minimal or absence of nausea and vomiting  10/24/2024 0149 by Jalil Zepeda RN  Outcome: Progressing     Problem: Gastrointestinal - Adult  Goal: Maintains or returns to baseline bowel function  10/24/2024 0149 by Jalil Zepeda RN  Outcome: Progressing     Problem: Gastrointestinal - Adult  Goal: Maintains adequate nutritional intake  10/24/2024 0149 by Jalil Zepeda RN  Outcome: Progressing     Problem: Genitourinary - Adult  Goal: Absence of urinary retention  10/24/2024 0149 by Jalil Zepeda RN  Outcome: Progressing     Problem: Metabolic/Fluid and Electrolytes - Adult  Goal: Electrolytes maintained within normal limits  10/24/2024 0149 by Jalil Zepeda RN  Outcome: Progressing     Problem: Hematologic - Adult  Goal: Maintains hematologic stability  10/24/2024 0149 by Jalil Zepeda RN  Outcome: Progressing     Problem: Safety - Adult  Goal: Free from fall injury  10/24/2024 0149 by Jalil Zepeda RN  Outcome: Progressing

## 2024-10-24 NOTE — PROGRESS NOTES
[facility]  SLP DYSPHAGIA THERAPY    Patient: Nyla Kaur   : 1945   MRN: [id number]    Treatment Date: 10/24/2024   Admitting Diagnosis:   TIA (transient ischemic attack) [G45.9]  Hypokalemia [E87.6]  Altered mental status, unspecified altered mental status type [R41.82]  Cerebrovascular accident (CVA) due to embolism of right middle cerebral artery (HCC) [I63.411]     has a past medical history of Anxiety, Balance disorder, GERD (gastroesophageal reflux disease), History of kidney stones, blood clots, Hyperlipidemia, Hypertension, Infection (2015), Liver cyst, Olfactory nerve injury, Right ear injury (20 yrs ago), and Thyroid disease.   has a past surgical history that includes Abdominoplasty (); Hysterectomy (); Facial cosmetic surgery (); Ovarian cyst surgery (); Uterine fibroid surgery (); back surgery (); Gastric Band (); Colonoscopy; Facial cosmetic surgery (); Cosmetic surgery; Cholecystectomy; Endoscopy, colon, diagnostic; eye surgery; other surgical history (2015); Colonoscopy (N/A, 2019); and Cystoscopy (Left, 1/3/2023).  Allergies: medication allergies noted    Dysphagia History including instrumental assessment: seen 2024 at [facility] with rec for regular/thin  GI history: colonoscopy on record as well as gastric band  ENT history: followed for hearing loss in the past  Baseline/Prior Level of Function: regular/thin; admitted from home    Onset: 10/23/2024     Treatment Diagnosis: Oropharyngeal dysphagia    CURRENT ENCOUNTER DIAGNOSTICS/COURSE OF ADMISSION     10/23/2024 admitted with c/o confusion. MD ADMISSION H&P HPI: \"Nyla Kaur is a 79 y.o. female with a pmh of dementia and hypertension who presents with acute confusion that started at home but had resolved by the time patient got to emergency department. In the ED NIH score was a 1.  Patient was not a candidate of thrombolytics secondary to resolved symptoms.\"     Most  Recent Imaging:  10/23/2024 CXR: 1. No acute cardiopulmonary process. 2. Elevation of the right hemidiaphragm.  10/23/2024 CT Head: No acute intracranial abnormality.   10/23/2024 CTA Head/Neck: No acute arterial abnormality or hemodynamically significant arterial stenosis in the head or neck.  10/23/2024  MRI Brain: 1. No acute intracranial abnormality.  No acute infarct. 2. Mild-to-moderate global parenchymal volume loss with moderate chronic microvascular ischemic changes.    Current Diet Level : Regular texture, Thin liquids      Respiratory Status: Room Air , stable    Reason for initial referral: SLP evaluation orders received due to coughing with pills    SUBJECTIVE     General: Accepted and tolerated tx feed with breakfast tray    Comments regarding diet toleration:   Patient: improved pill tolerance with crushing  Family: not present  Staff: RN echoes improved med tolerance with crushed meds    TREATMENT SUMMARY / IMPRESSIONS     Diagnosis: Oropharyngeal Dysphagia   potential for oropharyngeal dysphagia features characterized by prolonged but adequate oral prep which appears potentially intentional d/t report for preferences for small bites/sips and to take time and potential for delayed swallow initiation and decreased laryngeal elevation not appreciated at bedside   Respiratory Status: Room Air , stable  Cognitive/behavioral/communication: oriented to self/place/time/situation, alert, cooperative, verbally responsive, follows one step commands  PO Trials of current diet consistencies: prolonged but adequate oral prep with solids; no overt signs/symptoms of penetration/aspiration    Plan:  Continue current diet  Continue meds crushed as/if able; encouraged to consider continuing crushed meds after discharge  ST to follow-up x1 to confirm no additional skilled ST dysphagia needs    MEDICAL OR COGNITIVE/BEHAVIORAL FACTORS WHICH CAN EXACERBATE:   Comorbidities    Dysphagia Prognosis: good  Barriers to

## 2024-10-24 NOTE — PROGRESS NOTES
Physical Therapy  Facility/Department: [address] PROGRESSIVE CARE  Physical Therapy Treatment Note    Name: Nyla Kaur  : 1945  MRN: [id number]  Date of Service: 10/24/2024    Discharge Recommendations:  24 hour supervision or assist   PT Equipment Recommendations  Equipment Needed: No      Nyla Kaur scored a 18/24 on the AM-PAC short mobility form. Current research shows that an AM-PAC score of 18 or greater is typically associated with a discharge to the patient's home setting. Pt will be safe to return home at discharge with 24/7 assist from /family.  Please see assessment section for further patient specific details.    If patient discharges prior to next session this note will serve as a discharge summary.  Please see below for the latest assessment towards goals.       Patient Diagnosis(es): The primary encounter diagnosis was Altered mental status, unspecified altered mental status type. Diagnoses of TIA (transient ischemic attack), Hypokalemia, and Cerebrovascular accident (CVA) due to embolism of right middle cerebral artery (HCC) were also pertinent to this visit.  Past Medical History:  has a past medical history of Anxiety, Balance disorder, GERD (gastroesophageal reflux disease), History of kidney stones, Hx of blood clots, Hyperlipidemia, Hypertension, Infection, Liver cyst, Olfactory nerve injury, Right ear injury, and Thyroid disease.  Past Surgical History:  has a past surgical history that includes Abdominoplasty (); Hysterectomy (); Facial cosmetic surgery (); Ovarian cyst surgery (); Uterine fibroid surgery (); back surgery (); Gastric Band (); Colonoscopy; Facial cosmetic surgery ([id number]); Cosmetic surgery; Cholecystectomy; Endoscopy, colon, diagnostic; eye surgery; other surgical history (2015); Colonoscopy (N/A, 2019); and Cystoscopy (Left, 1/3/2023).    Assessment  Body Structures, Functions, Activity Limitations Requiring Skilled

## 2024-10-25 VITALS
HEIGHT: 65 IN | WEIGHT: 141.98 LBS | DIASTOLIC BLOOD PRESSURE: 56 MMHG | HEART RATE: 63 BPM | OXYGEN SATURATION: 95 % | BODY MASS INDEX: 23.65 KG/M2 | RESPIRATION RATE: 12 BRPM | TEMPERATURE: 98.4 F | SYSTOLIC BLOOD PRESSURE: 126 MMHG

## 2024-10-25 LAB
ANION GAP SERPL CALCULATED.3IONS-SCNC: 10 MMOL/L (ref 3–16)
BASOPHILS # BLD: 0.1 K/UL (ref 0–0.2)
BASOPHILS NFR BLD: 0.7 %
BUN SERPL-MCNC: 19 MG/DL (ref 7–20)
CALCIUM SERPL-MCNC: 10.1 MG/DL (ref 8.3–10.6)
CHLORIDE SERPL-SCNC: 101 MMOL/L (ref 99–110)
CO2 SERPL-SCNC: 27 MMOL/L (ref 21–32)
CREAT SERPL-MCNC: 0.6 MG/DL (ref 0.6–1.2)
DEPRECATED RDW RBC AUTO: 12.3 % (ref 12.4–15.4)
EOSINOPHIL # BLD: 0.1 K/UL (ref 0–0.6)
EOSINOPHIL NFR BLD: 1.6 %
GFR SERPLBLD CREATININE-BSD FMLA CKD-EPI: >90 ML/MIN/{1.73_M2}
GLUCOSE SERPL-MCNC: 89 MG/DL (ref 70–99)
HCT VFR BLD AUTO: 41.4 % (ref 36–48)
HGB BLD-MCNC: 14 G/DL (ref 12–16)
LYMPHOCYTES # BLD: 1.9 K/UL (ref 1–5.1)
LYMPHOCYTES NFR BLD: 21.9 %
MAGNESIUM SERPL-MCNC: 1.86 MG/DL (ref 1.8–2.4)
MCH RBC QN AUTO: 32.7 PG (ref 26–34)
MCHC RBC AUTO-ENTMCNC: 33.9 G/DL (ref 31–36)
MCV RBC AUTO: 96.5 FL (ref 80–100)
MONOCYTES # BLD: 0.7 K/UL (ref 0–1.3)
MONOCYTES NFR BLD: 8.4 %
NEUTROPHILS # BLD: 5.9 K/UL (ref 1.7–7.7)
NEUTROPHILS NFR BLD: 67.4 %
PLATELET # BLD AUTO: 150 K/UL (ref 135–450)
PLATELET BLD QL SMEAR: ABNORMAL
PMV BLD AUTO: 9.9 FL (ref 5–10.5)
POTASSIUM SERPL-SCNC: 3.4 MMOL/L (ref 3.5–5.1)
POTASSIUM SERPL-SCNC: 3.5 MMOL/L (ref 3.5–5.1)
RBC # BLD AUTO: 4.29 M/UL (ref 4–5.2)
SLIDE REVIEW: ABNORMAL
SODIUM SERPL-SCNC: 138 MMOL/L (ref 136–145)
WBC # BLD AUTO: 8.7 K/UL (ref 4–11)

## 2024-10-25 PROCEDURE — 96376 TX/PRO/DX INJ SAME DRUG ADON: CPT

## 2024-10-25 PROCEDURE — 80048 BASIC METABOLIC PNL TOTAL CA: CPT

## 2024-10-25 PROCEDURE — 2580000003 HC RX 258: Performed by: HOSPITALIST

## 2024-10-25 PROCEDURE — 36415 COLL VENOUS BLD VENIPUNCTURE: CPT

## 2024-10-25 PROCEDURE — G0378 HOSPITAL OBSERVATION PER HR: HCPCS

## 2024-10-25 PROCEDURE — 83735 ASSAY OF MAGNESIUM: CPT

## 2024-10-25 PROCEDURE — 97530 THERAPEUTIC ACTIVITIES: CPT

## 2024-10-25 PROCEDURE — 96372 THER/PROPH/DIAG INJ SC/IM: CPT

## 2024-10-25 PROCEDURE — 92526 ORAL FUNCTION THERAPY: CPT

## 2024-10-25 PROCEDURE — 2700000000 HC OXYGEN THERAPY PER DAY

## 2024-10-25 PROCEDURE — 97530 THERAPEUTIC ACTIVITIES: CPT | Performed by: PHYSICAL THERAPIST

## 2024-10-25 PROCEDURE — 94761 N-INVAS EAR/PLS OXIMETRY MLT: CPT

## 2024-10-25 PROCEDURE — 97535 SELF CARE MNGMENT TRAINING: CPT

## 2024-10-25 PROCEDURE — 6370000000 HC RX 637 (ALT 250 FOR IP)

## 2024-10-25 PROCEDURE — 6370000000 HC RX 637 (ALT 250 FOR IP): Performed by: HOSPITALIST

## 2024-10-25 PROCEDURE — 85025 COMPLETE CBC W/AUTO DIFF WBC: CPT

## 2024-10-25 PROCEDURE — 97116 GAIT TRAINING THERAPY: CPT | Performed by: PHYSICAL THERAPIST

## 2024-10-25 PROCEDURE — 84132 ASSAY OF SERUM POTASSIUM: CPT

## 2024-10-25 PROCEDURE — 6360000002 HC RX W HCPCS: Performed by: HOSPITALIST

## 2024-10-25 RX ORDER — POTASSIUM CHLORIDE 1500 MG/1
40 TABLET, EXTENDED RELEASE ORAL ONCE
Status: DISCONTINUED | OUTPATIENT
Start: 2024-10-25 | End: 2024-10-25

## 2024-10-25 RX ORDER — AMLODIPINE BESYLATE 10 MG/1
10 TABLET ORAL DAILY
Status: DISCONTINUED | OUTPATIENT
Start: 2024-10-26 | End: 2024-10-25 | Stop reason: HOSPADM

## 2024-10-25 RX ORDER — POTASSIUM CHLORIDE 1500 MG/1
20 TABLET, EXTENDED RELEASE ORAL DAILY
Qty: 10 TABLET | Refills: 0 | Status: SHIPPED | OUTPATIENT
Start: 2024-10-25

## 2024-10-25 RX ORDER — AMLODIPINE BESYLATE 10 MG/1
10 TABLET ORAL DAILY
Qty: 30 TABLET | Refills: 0 | Status: SHIPPED | OUTPATIENT
Start: 2024-10-26

## 2024-10-25 RX ADMIN — SODIUM CHLORIDE, PRESERVATIVE FREE 10 ML: 5 INJECTION INTRAVENOUS at 08:52

## 2024-10-25 RX ADMIN — ENOXAPARIN SODIUM 40 MG: 100 INJECTION SUBCUTANEOUS at 08:49

## 2024-10-25 RX ADMIN — LEVOTHYROXINE SODIUM 25 MCG: 0.03 TABLET ORAL at 05:13

## 2024-10-25 RX ADMIN — PANTOPRAZOLE SODIUM 40 MG: 40 TABLET, DELAYED RELEASE ORAL at 05:13

## 2024-10-25 RX ADMIN — HYDRALAZINE HYDROCHLORIDE 5 MG: 20 INJECTION INTRAMUSCULAR; INTRAVENOUS at 04:07

## 2024-10-25 RX ADMIN — CEFUROXIME AXETIL 250 MG: 250 TABLET ORAL at 08:50

## 2024-10-25 RX ADMIN — AMLODIPINE BESYLATE 5 MG: 5 TABLET ORAL at 08:50

## 2024-10-25 RX ADMIN — RIVASTIGMINE TARTRATE 3 MG: 1.5 CAPSULE ORAL at 08:51

## 2024-10-25 RX ADMIN — CARBIDOPA AND LEVODOPA 3 TABLET: 25; 100 TABLET ORAL at 08:50

## 2024-10-25 RX ADMIN — ATORVASTATIN CALCIUM 10 MG: 10 TABLET, FILM COATED ORAL at 08:50

## 2024-10-25 RX ADMIN — CARBIDOPA AND LEVODOPA 3 TABLET: 25; 100 TABLET ORAL at 12:32

## 2024-10-25 RX ADMIN — POTASSIUM BICARBONATE 40 MEQ: 782 TABLET, EFFERVESCENT ORAL at 12:08

## 2024-10-25 RX ADMIN — ASPIRIN 81 MG: 81 TABLET, CHEWABLE ORAL at 08:50

## 2024-10-25 RX ADMIN — VALSARTAN 80 MG: 80 TABLET ORAL at 08:50

## 2024-10-25 NOTE — PROGRESS NOTES
Occupational Therapy  Facility/Department: [address] PROGRESSIVE CARE  Occupational Therapy Daily Assessment    Name: Nyla Kaur  : 1945  MRN: [id number]  Date of Service: 10/25/2024    Discharge Recommendations:  24 hour supervision or assist  OT Equipment Recommendations  Equipment Needed: No     Nyla Kaur scored a 18/24 on the AM-PAC ADL Inpatient form.  At this time, no further OT is recommended upon discharge.  Recommend patient returns to prior setting with prior services.       Patient Diagnosis(es): The primary encounter diagnosis was Altered mental status, unspecified altered mental status type. Diagnoses of TIA (transient ischemic attack), Hypokalemia, and Cerebrovascular accident (CVA) due to embolism of right middle cerebral artery (HCC) were also pertinent to this visit.  Past Medical History:  has a past medical history of Anxiety, Balance disorder, GERD (gastroesophageal reflux disease), History of kidney stones, Hx of blood clots, Hyperlipidemia, Hypertension, Infection, Liver cyst, Olfactory nerve injury, Right ear injury, and Thyroid disease.  Past Surgical History:  has a past surgical history that includes Abdominoplasty (); Hysterectomy (); Facial cosmetic surgery (); Ovarian cyst surgery (); Uterine fibroid surgery (); back surgery (); Gastric Band (); Colonoscopy; Facial cosmetic surgery (); Cosmetic surgery; Cholecystectomy; Endoscopy, colon, diagnostic; eye surgery; other surgical history (2015); Colonoscopy (N/A, 2019); and Cystoscopy (Left, 1/3/2023).    Assessment  Performance deficits / Impairments: Decreased functional mobility ;Decreased cognition;Decreased high-level IADLs;Decreased ADL status;Decreased endurance;Decreased strength;Decreased balance;Decreased safe awareness  Assessment: 80 y/o female admitted 10/23/2024 with TIA. PTA pt reports living at home with spouse and ind with ADLs and functional mobility (spouse

## 2024-10-25 NOTE — PROGRESS NOTES
Discharge orders acknowledged by RN . Discharge teaching completed with pt and family. AVS reviewed and all questions answered. Medication regimen reviewed and pt understands schedule. E-scripts sent to pt RX. Follow up appointments also reviewed with pt and resources given for discharge. IV removed. Bedside monitor removed from pt. Pt vitals WNL. Pt discharged with all belongings to home with  and son. Pt transported off of unit via wheelchair . No complications.

## 2024-10-25 NOTE — CARE COORDINATION
Case Management Discharge Note          Date / Time of Note: 10/25/2024 1:25 PM                  Patient Name: Nyla Kaur   YOB: 1945  Diagnosis: TIA (transient ischemic attack) [G45.9]  Hypokalemia [E87.6]  Altered mental status, unspecified altered mental status type [R41.82]  Cerebrovascular accident (CVA) due to embolism of right middle cerebral artery (HCC) [I63.411]   Date / Time: 10/23/2024  1:25 AM    Financial:  Payor: [employer] MEDICARE / Plan: AET MEDICARE-ADVANTAGE PPO / Product Type: Medicare /      Pharmacy:    [facility] [id number] [address] [facility] [address] [address] [phone] - F [phone]  [address]  [facility] [address]  Phone: [phone] Fax: [phone]      Assistance purchasing medications?: Potential Assistance Purchasing Medications: No  Assistance provided by Case Management: None at this time    DISCHARGE Disposition: Home- No Services Needed    Transportation:  Transportation PLAN for discharge: family   Mode of Transport: Private Car  Time of Transport: at bedside    IMM Completed:   Not Indicated    Additional CM Notes:   DC order noted.  Family at bedside to transport.  Resume services with [facility].  Denied any firther dc needs.    The Plan for Transition of Care is related to the following treatment goals of TIA (transient ischemic attack) [G45.9]  Hypokalemia [E87.6]  Altered mental status, unspecified altered mental status type [R41.82]  Cerebrovascular accident (CVA) due to embolism of right middle cerebral artery (HCC) [I63.411]    The Patient and/or patient representative Nyla and her family were provided with a choice of provider and agrees with the discharge plan Yes    Freedom of choice list was provided with basic dialogue that supports the patient's individualized plan of care/goals and shares the quality data associated with the providers. Not Indicated    Shahnaz Mullins RN  [facility]   Case Management

## 2024-10-25 NOTE — PLAN OF CARE
Problem: Discharge Planning  Goal: Discharge to home or other facility with appropriate resources  10/24/2024 2353 by Candis Yepez RN  Outcome: Progressing  Flowsheets (Taken 10/24/2024 2353)  Discharge to home or other facility with appropriate resources: Identify barriers to discharge with patient and caregiver     Problem: Neurosensory - Adult  Goal: Achieves stable or improved neurological status  10/24/2024 2353 by Candis Yepez RN  Outcome: Progressing  Flowsheets (Taken 10/24/2024 2353)  Achieves stable or improved neurological status: Assess for and report changes in neurological status     Problem: Respiratory - Adult  Goal: Achieves optimal ventilation and oxygenation  10/24/2024 2353 by Candis Yepez RN  Outcome: Progressing  Flowsheets (Taken 10/24/2024 2353)  Achieves optimal ventilation and oxygenation:   Assess for changes in respiratory status   Assess for changes in mentation and behavior     Problem: Musculoskeletal - Adult  Goal: Return mobility to safest level of function  10/24/2024 2353 by Candis Yepez RN  Outcome: Progressing     Problem: Safety - Adult  Goal: Free from fall injury  10/24/2024 2353 by Candis Yepez RN  Outcome: Progressing  Flowsheets (Taken 10/24/2024 2353)  Free From Fall Injury:   Instruct family/caregiver on patient safety   Based on caregiver fall risk screen, instruct family/caregiver to ask for assistance with transferring infant if caregiver noted to have fall risk factors     Problem: ABCDS Injury Assessment  Goal: Absence of physical injury  10/24/2024 2353 by Candis Yepez RN  Outcome: Progressing  Flowsheets (Taken 10/24/2024 2353)  Absence of Physical Injury: Implement safety measures based on patient assessment

## 2024-10-25 NOTE — PROGRESS NOTES
[facility]  SLP DYSPHAGIA THERAPY  DISCHARGE SUMMARY    Patient: Nyla Kaur   : 1945   MRN: [id number]    Treatment Date: 10/25/2024   Admitting Diagnosis:   TIA (transient ischemic attack) [G45.9]  Hypokalemia [E87.6]  Altered mental status, unspecified altered mental status type [R41.82]  Cerebrovascular accident (CVA) due to embolism of right middle cerebral artery (HCC) [I63.411]     has a past medical history of Anxiety, Balance disorder, GERD (gastroesophageal reflux disease), History of kidney stones, blood clots, Hyperlipidemia, Hypertension, Infection (2015), Liver cyst, Olfactory nerve injury, Right ear injury (20 yrs ago), and Thyroid disease.   has a past surgical history that includes Abdominoplasty (); Hysterectomy (); Facial cosmetic surgery (); Ovarian cyst surgery (); Uterine fibroid surgery (); back surgery (); Gastric Band (); Colonoscopy; Facial cosmetic surgery ([id number]); Cosmetic surgery; Cholecystectomy; Endoscopy, colon, diagnostic; eye surgery; other surgical history (2015); Colonoscopy (N/A, 2019); and Cystoscopy (Left, 1/3/2023).  Allergies: medication allergies noted    Dysphagia History including instrumental assessment: seen 2024 at [facility] with rec for regular/thin  GI history: colonoscopy on record as well as gastric band  ENT history: followed for hearing loss in the past  Baseline/Prior Level of Function: regular/thin; admitted from home    Onset: 10/23/2024     Treatment Diagnosis: Oropharyngeal dysphagia    CURRENT ENCOUNTER DIAGNOSTICS/COURSE OF ADMISSION     10/23/2024 admitted with c/o confusion. MD ADMISSION H&P HPI: \"Nyla Kaur is a 79 y.o. female with a pmh of dementia and hypertension who presents with acute confusion that started at home but had resolved by the time patient got to emergency department. In the ED NIH score was a 1.  Patient was not a candidate of thrombolytics secondary to resolved  education: RN verbalizes understanding/agreement      If patient discharges prior to next visit, this note will serve as discharge.     Timed Code Minutes: 0  Total Treatment Minutes: 24    Electronically signed by:    Adia Young MA, CCC-SLP, #[id number]  Speech-Language Pathologist  Portable phone: [phone]   10/25/24  1:22 PM

## 2024-10-25 NOTE — PROGRESS NOTES
Brief Neurology note:     Update given to  per nursing request.   Patient not available at time of encounter.   Case discussed today with primary team, no further needs from neurology.     Neurology signing off.     Torri Isabel CNP  Neurology.

## 2024-10-25 NOTE — PROGRESS NOTES
Patient alert and oriented x 4 and up x 1 stedy. Patient voiding via BRP. Patient tolerating diet with no c/o nausea or pain at this time. Initial assessment completed, see flowsheet. Patient's bed is locked and in lowest position, side rails up x 2 with an active bed alarm. Non slip socks applied.

## 2024-10-25 NOTE — PROCEDURES
[facility]          [address] [place] [address]                       ELECTROENCEPHALOGRAM REPORT      PATIENT NAME: CLARA JOEL               : 1945  MED REC NO: [id number]                      ROOM: [place] [address]  ACCOUNT NO: [id number]                       ADMIT DATE: 10/23/2024  PROVIDER: Mehrdad Carvalho DO      DATE OF SERVICE:  10/24/2024    REFERRING PHYSICIAN:  Janel Chowdhury MD    REASON FOR STUDY:  Transient altered mental status.    BRIEF HISTORY AND NEUROLOGIC FINDINGS:  The patient is an 79-year-old female, being evaluated for reported altered mental status.    MEDICATIONS:  The patient's centrally acting medications listed include Sinemet, Zoloft, and Exelon.    EEG FINDINGS:  This is a 20-channel digital EEG performed utilizing bipolar and referential montages.  Wakefulness, drowsiness, and sleep were obtained during the recording.  During maximal wakefulness, there is a moderate voltage, symmetric, somewhat disorganized at times though reactive 8 hertz posterior dominant background rhythm.  The anterior background consists of low-voltage mixed frequencies.  Drowsiness is manifested by attenuation of waking background rhythms.  Sleep is manifested by sleep spindles and K-complexes.    Occasional bitemporal theta and rare delta slowing was noted during the recording.  There was also some mild disorganization in those regions.    Photic stimulation was performed at various flash frequencies and produced a minimal posterior driving response at few flash frequencies.   Hyperventilation exercise was not performed due to the patient's age.    A 1-channel EKG rhythm strip was reviewed and showed no obvious cardiac dysrhythmias.    EEG DIAGNOSIS:  Abnormal awake and asleep electroencephalogram secondary to bitemporal slowing and disorganization.    CLINICAL INTERPRETATION:  Bitemporal slowing and disorganization are nonspecific and consistent

## 2024-10-25 NOTE — PROGRESS NOTES
Physical Therapy  Facility/Department: [address] PROGRESSIVE CARE  Physical Therapy Treatment Note    Name: Nyla Kaur  : 1945  MRN: [id number]  Date of Service: 10/25/2024    Discharge Recommendations:  24 hour supervision or assist   PT Equipment Recommendations  Equipment Needed: No      Nyla Kaur scored a  on the AM-PAC short mobility form. Current research shows that an AM-PAC score of 18 or greater is typically associated with a discharge to the patient's home setting. At this time, pt will be safe to return home with family assist. No further therapy after discharge will be warranted.  Please see assessment section for further patient specific details.    If patient discharges prior to next session this note will serve as a discharge summary.  Please see below for the latest assessment towards goals.       Patient Diagnosis(es): The primary encounter diagnosis was Altered mental status, unspecified altered mental status type. Diagnoses of TIA (transient ischemic attack), Hypokalemia, and Cerebrovascular accident (CVA) due to embolism of right middle cerebral artery (HCC) were also pertinent to this visit.  Past Medical History:  has a past medical history of Anxiety, Balance disorder, GERD (gastroesophageal reflux disease), History of kidney stones, Hx of blood clots, Hyperlipidemia, Hypertension, Infection, Liver cyst, Olfactory nerve injury, Right ear injury, and Thyroid disease.  Past Surgical History:  has a past surgical history that includes Abdominoplasty (); Hysterectomy (); Facial cosmetic surgery (); Ovarian cyst surgery (); Uterine fibroid surgery (); back surgery (); Gastric Band (); Colonoscopy; Facial cosmetic surgery ([id number]); Cosmetic surgery; Cholecystectomy; Endoscopy, colon, diagnostic; eye surgery; other surgical history (2015); Colonoscopy (N/A, 2019); and Cystoscopy (Left, 1/3/2023).    Assessment  Body Structures, Functions,  yes

## 2024-10-29 NOTE — DISCHARGE SUMMARY
ARCH/ARCH VESSELS: No dissection or arterial injury.  No significant stenosis of the brachiocephalic or subclavian arteries. CAROTID ARTERIES: No dissection, arterial injury, or hemodynamically significant stenosis by NASCET criteria. VERTEBRAL ARTERIES: No dissection, arterial injury, or significant stenosis. SOFT TISSUES: The lung apices are clear.  No cervical or superior mediastinal lymphadenopathy.  The larynx and pharynx are unremarkable.  No acute abnormality of the salivary and thyroid glands. BONES: No acute osseous abnormality. CTA HEAD: ANTERIOR CIRCULATION: No significant stenosis of the intracranial internal carotid, anterior cerebral, or middle cerebral arteries. No aneurysm. POSTERIOR CIRCULATION: No significant stenosis of the vertebral, basilar, or posterior cerebral arteries. No aneurysm. OTHER: No dural venous sinus thrombosis on this non-dedicated study. BRAIN: No mass effect or midline shift. No extra-axial fluid collection. The gray-white differentiation is maintained.     No acute arterial abnormality or hemodynamically significant arterial stenosis in the head or neck.     CT HEAD WO CONTRAST    Addendum Date: 10/23/2024    ADDENDUM: Critical results were called by Huber Izaguirre to Dr. Shavonne Ford on 10/23/2024 at 02:02.     Result Date: 10/23/2024  EXAMINATION: CT OF THE HEAD WITHOUT CONTRAST  10/23/2024 1:30 am TECHNIQUE: CT of the head was performed without the administration of intravenous contrast. Automated exposure control, iterative reconstruction, and/or weight based adjustment of the mA/kV was utilized to reduce the radiation dose to as low as reasonably achievable. COMPARISON: CT brain 10/18/2024. HISTORY: ORDERING SYSTEM PROVIDED HISTORY: Stroke TECHNOLOGIST PROVIDED HISTORY: Has a \"code stroke\" or \"stroke alert\" been called?->Yes Reason for exam:->Stroke Decision Support Exception - unselect if not a suspected or confirmed emergency medical condition->Emergency Medical

## 2024-11-08 ENCOUNTER — APPOINTMENT (OUTPATIENT)
Dept: GENERAL RADIOLOGY | Age: 79
End: 2024-11-08
Payer: MEDICARE

## 2024-11-08 ENCOUNTER — APPOINTMENT (OUTPATIENT)
Dept: CT IMAGING | Age: 79
End: 2024-11-08
Payer: MEDICARE

## 2024-11-08 ENCOUNTER — HOSPITAL ENCOUNTER (INPATIENT)
Age: 79
LOS: 7 days | Discharge: NURSING, REHAB OR LONG-TERM CARE | End: 2024-11-15
Attending: EMERGENCY MEDICINE | Admitting: HOSPITALIST
Payer: MEDICARE

## 2024-11-08 DIAGNOSIS — I26.94 MULTIPLE SUBSEGMENTAL PULMONARY EMBOLI WITHOUT ACUTE COR PULMONALE (HCC): ICD-10-CM

## 2024-11-08 DIAGNOSIS — I16.1 HYPERTENSIVE EMERGENCY: ICD-10-CM

## 2024-11-08 DIAGNOSIS — F07.81 POST CONCUSSIVE ENCEPHALOPATHY: ICD-10-CM

## 2024-11-08 DIAGNOSIS — R11.2 NAUSEA AND VOMITING, UNSPECIFIED VOMITING TYPE: ICD-10-CM

## 2024-11-08 DIAGNOSIS — S09.90XA CLOSED HEAD INJURY, INITIAL ENCOUNTER: Primary | ICD-10-CM

## 2024-11-08 LAB
ALBUMIN SERPL-MCNC: 4.6 G/DL (ref 3.4–5)
ALBUMIN/GLOB SERPL: 1.9 {RATIO} (ref 1.1–2.2)
ALP SERPL-CCNC: 79 U/L (ref 40–129)
ALT SERPL-CCNC: <5 U/L (ref 10–40)
ANION GAP SERPL CALCULATED.3IONS-SCNC: 11 MMOL/L (ref 3–16)
AST SERPL-CCNC: 15 U/L (ref 15–37)
BACTERIA URNS QL MICRO: ABNORMAL /HPF
BASOPHILS # BLD: 0.1 K/UL (ref 0–0.2)
BASOPHILS NFR BLD: 0.6 %
BILIRUB SERPL-MCNC: 0.3 MG/DL (ref 0–1)
BILIRUB UR QL STRIP.AUTO: NEGATIVE
BUN SERPL-MCNC: 18 MG/DL (ref 7–20)
CALCIUM SERPL-MCNC: 10.2 MG/DL (ref 8.3–10.6)
CHLORIDE SERPL-SCNC: 103 MMOL/L (ref 99–110)
CLARITY UR: ABNORMAL
CO2 SERPL-SCNC: 26 MMOL/L (ref 21–32)
COLOR UR: YELLOW
CREAT SERPL-MCNC: 0.7 MG/DL (ref 0.6–1.2)
DEPRECATED RDW RBC AUTO: 12.3 % (ref 12.4–15.4)
EKG ATRIAL RATE: 63 BPM
EKG DIAGNOSIS: NORMAL
EKG P AXIS: 58 DEGREES
EKG P-R INTERVAL: 192 MS
EKG Q-T INTERVAL: 480 MS
EKG QRS DURATION: 118 MS
EKG QTC CALCULATION (BAZETT): 491 MS
EKG R AXIS: -63 DEGREES
EKG T AXIS: 98 DEGREES
EKG VENTRICULAR RATE: 63 BPM
EOSINOPHIL # BLD: 0.1 K/UL (ref 0–0.6)
EOSINOPHIL NFR BLD: 0.7 %
EPI CELLS #/AREA URNS AUTO: 1 /HPF (ref 0–5)
GFR SERPLBLD CREATININE-BSD FMLA CKD-EPI: 87 ML/MIN/{1.73_M2}
GLUCOSE BLD-MCNC: 146 MG/DL (ref 70–99)
GLUCOSE SERPL-MCNC: 163 MG/DL (ref 70–99)
GLUCOSE UR STRIP.AUTO-MCNC: NEGATIVE MG/DL
HCT VFR BLD AUTO: 41.2 % (ref 36–48)
HGB BLD-MCNC: 13.9 G/DL (ref 12–16)
HGB UR QL STRIP.AUTO: NEGATIVE
HYALINE CASTS #/AREA URNS AUTO: 4 /LPF (ref 0–8)
KETONES UR STRIP.AUTO-MCNC: 15 MG/DL
LEUKOCYTE ESTERASE UR QL STRIP.AUTO: NEGATIVE
LIPASE SERPL-CCNC: 26 U/L (ref 13–60)
LYMPHOCYTES # BLD: 1.2 K/UL (ref 1–5.1)
LYMPHOCYTES NFR BLD: 11.1 %
MAGNESIUM SERPL-MCNC: 1.91 MG/DL (ref 1.8–2.4)
MCH RBC QN AUTO: 32.3 PG (ref 26–34)
MCHC RBC AUTO-ENTMCNC: 33.8 G/DL (ref 31–36)
MCV RBC AUTO: 95.7 FL (ref 80–100)
MONOCYTES # BLD: 0.6 K/UL (ref 0–1.3)
MONOCYTES NFR BLD: 5.4 %
MUCUS: PRESENT
NEUTROPHILS # BLD: 9.2 K/UL (ref 1.7–7.7)
NEUTROPHILS NFR BLD: 82.2 %
NITRITE UR QL STRIP.AUTO: NEGATIVE
PERFORMED ON: ABNORMAL
PH UR STRIP.AUTO: 5.5 [PH] (ref 5–8)
PLATELET # BLD AUTO: 248 K/UL (ref 135–450)
PMV BLD AUTO: 9.4 FL (ref 5–10.5)
POTASSIUM SERPL-SCNC: 3.5 MMOL/L (ref 3.5–5.1)
PROT SERPL-MCNC: 7 G/DL (ref 6.4–8.2)
PROT UR STRIP.AUTO-MCNC: 30 MG/DL
RBC # BLD AUTO: 4.3 M/UL (ref 4–5.2)
RBC CLUMPS #/AREA URNS AUTO: 1 /HPF (ref 0–4)
SODIUM SERPL-SCNC: 140 MMOL/L (ref 136–145)
SP GR UR STRIP.AUTO: 1.02 (ref 1–1.03)
UA COMPLETE W REFLEX CULTURE PNL UR: ABNORMAL
UA DIPSTICK W REFLEX MICRO PNL UR: YES
URN SPEC COLLECT METH UR: ABNORMAL
UROBILINOGEN UR STRIP-ACNC: 1 E.U./DL
WBC # BLD AUTO: 11.2 K/UL (ref 4–11)
WBC #/AREA URNS AUTO: 2 /HPF (ref 0–5)

## 2024-11-08 PROCEDURE — 6360000002 HC RX W HCPCS: Performed by: HOSPITALIST

## 2024-11-08 PROCEDURE — 93005 ELECTROCARDIOGRAM TRACING: CPT | Performed by: EMERGENCY MEDICINE

## 2024-11-08 PROCEDURE — 81001 URINALYSIS AUTO W/SCOPE: CPT

## 2024-11-08 PROCEDURE — 2000000000 HC ICU R&B

## 2024-11-08 PROCEDURE — 93010 ELECTROCARDIOGRAM REPORT: CPT | Performed by: INTERNAL MEDICINE

## 2024-11-08 PROCEDURE — 70450 CT HEAD/BRAIN W/O DYE: CPT

## 2024-11-08 PROCEDURE — 2580000003 HC RX 258: Performed by: EMERGENCY MEDICINE

## 2024-11-08 PROCEDURE — 2580000003 HC RX 258: Performed by: HOSPITALIST

## 2024-11-08 PROCEDURE — 51701 INSERT BLADDER CATHETER: CPT

## 2024-11-08 PROCEDURE — 2700000000 HC OXYGEN THERAPY PER DAY

## 2024-11-08 PROCEDURE — 83735 ASSAY OF MAGNESIUM: CPT

## 2024-11-08 PROCEDURE — 85025 COMPLETE CBC W/AUTO DIFF WBC: CPT

## 2024-11-08 PROCEDURE — 94761 N-INVAS EAR/PLS OXIMETRY MLT: CPT

## 2024-11-08 PROCEDURE — 80053 COMPREHEN METABOLIC PANEL: CPT

## 2024-11-08 PROCEDURE — 51702 INSERT TEMP BLADDER CATH: CPT

## 2024-11-08 PROCEDURE — 83690 ASSAY OF LIPASE: CPT

## 2024-11-08 PROCEDURE — 74018 RADEX ABDOMEN 1 VIEW: CPT

## 2024-11-08 PROCEDURE — 72125 CT NECK SPINE W/O DYE: CPT

## 2024-11-08 PROCEDURE — 96374 THER/PROPH/DIAG INJ IV PUSH: CPT

## 2024-11-08 PROCEDURE — 6360000002 HC RX W HCPCS: Performed by: EMERGENCY MEDICINE

## 2024-11-08 PROCEDURE — 99285 EMERGENCY DEPT VISIT HI MDM: CPT

## 2024-11-08 RX ORDER — POTASSIUM CHLORIDE 1500 MG/1
40 TABLET, EXTENDED RELEASE ORAL PRN
Status: DISCONTINUED | OUTPATIENT
Start: 2024-11-08 | End: 2024-11-15 | Stop reason: HOSPADM

## 2024-11-08 RX ORDER — SODIUM CHLORIDE 9 MG/ML
INJECTION, SOLUTION INTRAVENOUS PRN
Status: DISCONTINUED | OUTPATIENT
Start: 2024-11-08 | End: 2024-11-15 | Stop reason: HOSPADM

## 2024-11-08 RX ORDER — ONDANSETRON 4 MG/1
4 TABLET, ORALLY DISINTEGRATING ORAL EVERY 8 HOURS PRN
Status: DISCONTINUED | OUTPATIENT
Start: 2024-11-08 | End: 2024-11-15 | Stop reason: HOSPADM

## 2024-11-08 RX ORDER — POTASSIUM CHLORIDE 7.45 MG/ML
10 INJECTION INTRAVENOUS PRN
Status: DISCONTINUED | OUTPATIENT
Start: 2024-11-08 | End: 2024-11-15 | Stop reason: HOSPADM

## 2024-11-08 RX ORDER — ACETAMINOPHEN 325 MG/1
650 TABLET ORAL EVERY 6 HOURS PRN
Status: DISCONTINUED | OUTPATIENT
Start: 2024-11-08 | End: 2024-11-15 | Stop reason: HOSPADM

## 2024-11-08 RX ORDER — PROMETHAZINE HYDROCHLORIDE 25 MG/ML
12.5 INJECTION, SOLUTION INTRAMUSCULAR; INTRAVENOUS EVERY 6 HOURS PRN
Status: DISCONTINUED | OUTPATIENT
Start: 2024-11-08 | End: 2024-11-15 | Stop reason: HOSPADM

## 2024-11-08 RX ORDER — 0.9 % SODIUM CHLORIDE 0.9 %
500 INTRAVENOUS SOLUTION INTRAVENOUS ONCE
Status: COMPLETED | OUTPATIENT
Start: 2024-11-08 | End: 2024-11-08

## 2024-11-08 RX ORDER — LABETALOL HYDROCHLORIDE 5 MG/ML
10 INJECTION, SOLUTION INTRAVENOUS ONCE
Status: DISCONTINUED | OUTPATIENT
Start: 2024-11-08 | End: 2024-11-08

## 2024-11-08 RX ORDER — ACETAMINOPHEN 650 MG/1
650 SUPPOSITORY RECTAL EVERY 6 HOURS PRN
Status: DISCONTINUED | OUTPATIENT
Start: 2024-11-08 | End: 2024-11-15 | Stop reason: HOSPADM

## 2024-11-08 RX ORDER — ONDANSETRON 2 MG/ML
4 INJECTION INTRAMUSCULAR; INTRAVENOUS ONCE
Status: COMPLETED | OUTPATIENT
Start: 2024-11-08 | End: 2024-11-08

## 2024-11-08 RX ORDER — SODIUM CHLORIDE 0.9 % (FLUSH) 0.9 %
5-40 SYRINGE (ML) INJECTION EVERY 12 HOURS SCHEDULED
Status: DISCONTINUED | OUTPATIENT
Start: 2024-11-08 | End: 2024-11-15 | Stop reason: HOSPADM

## 2024-11-08 RX ORDER — AMLODIPINE BESYLATE 5 MG/1
10 TABLET ORAL ONCE
Status: DISCONTINUED | OUTPATIENT
Start: 2024-11-08 | End: 2024-11-08

## 2024-11-08 RX ORDER — SODIUM CHLORIDE 0.9 % (FLUSH) 0.9 %
5-40 SYRINGE (ML) INJECTION PRN
Status: DISCONTINUED | OUTPATIENT
Start: 2024-11-08 | End: 2024-11-15 | Stop reason: HOSPADM

## 2024-11-08 RX ORDER — SODIUM CHLORIDE 9 MG/ML
INJECTION, SOLUTION INTRAVENOUS CONTINUOUS
Status: DISCONTINUED | OUTPATIENT
Start: 2024-11-08 | End: 2024-11-09

## 2024-11-08 RX ORDER — HYDRALAZINE HYDROCHLORIDE 20 MG/ML
10 INJECTION INTRAMUSCULAR; INTRAVENOUS EVERY 6 HOURS PRN
Status: DISCONTINUED | OUTPATIENT
Start: 2024-11-08 | End: 2024-11-15 | Stop reason: HOSPADM

## 2024-11-08 RX ORDER — MAGNESIUM SULFATE IN WATER 40 MG/ML
2000 INJECTION, SOLUTION INTRAVENOUS PRN
Status: DISCONTINUED | OUTPATIENT
Start: 2024-11-08 | End: 2024-11-15 | Stop reason: HOSPADM

## 2024-11-08 RX ORDER — POLYETHYLENE GLYCOL 3350 17 G/17G
17 POWDER, FOR SOLUTION ORAL DAILY PRN
Status: DISCONTINUED | OUTPATIENT
Start: 2024-11-08 | End: 2024-11-15 | Stop reason: HOSPADM

## 2024-11-08 RX ORDER — ONDANSETRON 2 MG/ML
4 INJECTION INTRAMUSCULAR; INTRAVENOUS EVERY 6 HOURS PRN
Status: DISCONTINUED | OUTPATIENT
Start: 2024-11-08 | End: 2024-11-15 | Stop reason: HOSPADM

## 2024-11-08 RX ORDER — LABETALOL HYDROCHLORIDE 5 MG/ML
5 INJECTION, SOLUTION INTRAVENOUS EVERY 4 HOURS PRN
Status: DISCONTINUED | OUTPATIENT
Start: 2024-11-08 | End: 2024-11-15 | Stop reason: HOSPADM

## 2024-11-08 RX ADMIN — LABETALOL HYDROCHLORIDE 5 MG: 5 INJECTION INTRAVENOUS at 12:12

## 2024-11-08 RX ADMIN — SODIUM CHLORIDE: 9 INJECTION, SOLUTION INTRAVENOUS at 13:18

## 2024-11-08 RX ADMIN — ONDANSETRON 4 MG: 2 INJECTION INTRAMUSCULAR; INTRAVENOUS at 16:43

## 2024-11-08 RX ADMIN — HYDRALAZINE HYDROCHLORIDE 10 MG: 20 INJECTION INTRAMUSCULAR; INTRAVENOUS at 09:05

## 2024-11-08 RX ADMIN — SODIUM CHLORIDE, PRESERVATIVE FREE 10 ML: 5 INJECTION INTRAVENOUS at 20:24

## 2024-11-08 RX ADMIN — ONDANSETRON 4 MG: 2 INJECTION INTRAMUSCULAR; INTRAVENOUS at 02:42

## 2024-11-08 RX ADMIN — SODIUM CHLORIDE, PRESERVATIVE FREE 10 ML: 5 INJECTION INTRAVENOUS at 08:20

## 2024-11-08 RX ADMIN — LABETALOL HYDROCHLORIDE 5 MG: 5 INJECTION INTRAVENOUS at 20:32

## 2024-11-08 RX ADMIN — PROMETHAZINE HYDROCHLORIDE 12.5 MG: 25 INJECTION INTRAMUSCULAR; INTRAVENOUS at 21:50

## 2024-11-08 RX ADMIN — SODIUM CHLORIDE 500 ML: 9 INJECTION, SOLUTION INTRAVENOUS at 07:40

## 2024-11-08 RX ADMIN — PROMETHAZINE HYDROCHLORIDE 12.5 MG: 25 INJECTION INTRAMUSCULAR; INTRAVENOUS at 13:28

## 2024-11-08 RX ADMIN — ONDANSETRON 4 MG: 2 INJECTION INTRAMUSCULAR; INTRAVENOUS at 10:53

## 2024-11-08 ASSESSMENT — PAIN SCALES - GENERAL
PAINLEVEL_OUTOF10: 2
PAINLEVEL_OUTOF10: 3
PAINLEVEL_OUTOF10: 0

## 2024-11-08 ASSESSMENT — PAIN - FUNCTIONAL ASSESSMENT
PAIN_FUNCTIONAL_ASSESSMENT: 0-10
PAIN_FUNCTIONAL_ASSESSMENT: ADULT NONVERBAL PAIN SCALE (NPVS)

## 2024-11-08 ASSESSMENT — PAIN DESCRIPTION - DESCRIPTORS: DESCRIPTORS: ACHING;POUNDING

## 2024-11-08 ASSESSMENT — PAIN DESCRIPTION - LOCATION: LOCATION: HEAD

## 2024-11-08 NOTE — PROGRESS NOTES
SLP NOTE    Swallow evaluation order received. Evaluation attempted. Patient with limited level of alertness. ST to re-attempt as schedule permits pending status unless otherwise notified.    Thank you.  Adia Young MA, CCC-SLP, #[id number]  Speech-Language Pathologist  Portable phone: [phone]

## 2024-11-08 NOTE — PROGRESS NOTES
Telephone conversation with patient's son, Brandt. Updates provided and all questions answered at this time.    Electronically signed by Galdino Baig RN on 11/8/2024 at 2:59 PM

## 2024-11-08 NOTE — H&P
HISTORY AND PHYSICAL             Date: 11/8/2024        Patient Name: Nyla Kaur     YOB: 1945      Age:  79 y.o.    Chief Complaint     Chief Complaint   Patient presents with    Fall    Nausea     Pt fell around 0030 per  and has been having decreased LOC and emesis since then.  denies thinners           History Obtained From   Spouse, emr, er staff    History of Present Illness   79f with Parkinsons presents to the ER following a fall around 12:30 this am. Patient intially was responsive, at baseline where she is normally oriented and able to converse. Patient without recent complaint of chest pain, sob, abd pain, n/v.   Patient presented with GCS8, has been nauseated, initial head ct without acute findings.  Patient is currently seen in ER, resists passive opening of L eye > R with anisocoria noted on present exam R pupil 4 mm, L 3 mm. No respiratory distress    Past Medical History     Past Medical History:   Diagnosis Date    Anxiety     Balance disorder     unknown etiology    GERD (gastroesophageal reflux disease)     History of kidney stones     Hx of blood clots     x 1 from use of BCPs    Hyperlipidemia     Hypertension     Infection 11/2015    gastric band    Liver cyst     \"possible\"    Olfactory nerve injury     mostly resolved    Right ear injury 20 yrs ago    Thyroid disease         Past Surgical History     Past Surgical History:   Procedure Laterality Date    ABDOMINOPLASTY  2012    also cosmetic surgery on arms    BACK SURGERY  1998    exc. cyst lumbar    CHOLECYSTECTOMY      COLONOSCOPY      COLONOSCOPY N/A 8/19/2019    COLONOSCOPY DIAGNOSTIC performed by Brandt Amin MD at [facility]    COSMETIC SURGERY      breast reduction    CYSTOSCOPY Left 1/3/2023    CYSTOSCOPY LEFT STENT PLACEMENT performed by Matthew Nettles MD at [facility] OR    ENDOSCOPY, COLON, DIAGNOSTIC      EYE SURGERY      cataracts    FACIAL COSMETIC SURGERY  1986    also breast reduction

## 2024-11-08 NOTE — PLAN OF CARE
Problem: Discharge Planning  Goal: Discharge to home or other facility with appropriate resources  Outcome: Progressing  Flowsheets (Taken 11/8/2024 1218)  Discharge to home or other facility with appropriate resources:   Identify barriers to discharge with patient and caregiver   Identify discharge learning needs (meds, wound care, etc)   Refer to discharge planning if patient needs post-hospital services based on physician order or complex needs related to functional status, cognitive ability or social support system   Arrange for needed discharge resources and transportation as appropriate     Problem: Pain  Goal: Verbalizes/displays adequate comfort level or baseline comfort level  Outcome: Progressing  Flowsheets (Taken 11/8/2024 1218)  Verbalizes/displays adequate comfort level or baseline comfort level:   Encourage patient to monitor pain and request assistance   Administer analgesics based on type and severity of pain and evaluate response   Consider cultural and social influences on pain and pain management   Assess pain using appropriate pain scale   Implement non-pharmacological measures as appropriate and evaluate response   Notify Licensed Independent Practitioner if interventions unsuccessful or patient reports new pain     Problem: Safety - Adult  Goal: Free from fall injury  Outcome: Progressing  Flowsheets (Taken 11/8/2024 1218)  Free From Fall Injury:   Instruct family/caregiver on patient safety   Based on caregiver fall risk screen, instruct family/caregiver to ask for assistance with transferring infant if caregiver noted to have fall risk factors     Problem: Skin/Tissue Integrity  Goal: Absence of new skin breakdown  Description: 1.  Monitor for areas of redness and/or skin breakdown  2.  Assess vascular access sites hourly  3.  Every 4-6 hours minimum:  Change oxygen saturation probe site  4.  Every 4-6 hours:  If on nasal continuous positive airway pressure, respiratory therapy assess

## 2024-11-08 NOTE — PROGRESS NOTES
Patient noted to become bradycardic into the 20s. When RN walked into room patient was actively vomiting. Similar episode occurred earlier when patient has vomited. She became bradycardic in the 40s. Secure Message sent to Dr. Cox to make aware of occurrence and inquire about further medication to help with nausea.    Electronically signed by Galdino Baig RN on 11/8/2024 at 12:38 PM

## 2024-11-08 NOTE — CARE COORDINATION
Case Management Assessment  Initial Evaluation    Date/Time of Evaluation: 11/8/2024 4:47 PM  Assessment Completed by: Sung Nick RN    If patient is discharged prior to next notation, then this note serves as note for discharge by case management.    Patient Name: Nyla Kaur                   YOB: 1945  Diagnosis: Post concussive encephalopathy [F07.81]  Concussion syndrome [F07.81]  Hypertensive emergency [I16.1]  Closed head injury, initial encounter [S09.90XA]  Nausea and vomiting, unspecified vomiting type [R11.2]                   Date / Time: 11/8/2024  2:35 AM    Patient Admission Status: Inpatient   Readmission Risk (Low < 19, Mod (19-27), High > 27): Readmission Risk Score: 12.4    Current PCP: Kendra Acevedo, DO  PCP verified by ? Yes    Chart Reviewed: Yes      History Provided by: Spouse  Patient Orientation: Other (see comment) (Confused)    Patient Cognition: Alert    Hospitalization in the last 30 days (Readmission):  Yes    If yes, Readmission Assessment in  Navigator will be completed.    Advance Directives:      Code Status: Full Code   Patient's Primary Decision Maker is: Legal Next of Kin    Primary Decision Maker: Julián Kaur - Spouse - [phone]    Discharge Planning:    Patient lives with: Spouse/Significant Other Type of Home: (!) Hospice Facility  Primary Care Giver: Family  Patient Support Systems include: Spouse/Significant Other, Family Members   Current Financial resources: Medicare  Current community resources: Other (Comment) (Active w/[employer])  Current services prior to admission: Durable Medical Equipment            Current DME: Walker, Cane            Type of Home Care services:  None    ADLS  Prior functional level: Assistance with the following:, Cooking, Housework, Shopping, Mobility  Current functional level: Assistance with the following:, Cooking, Housework, Shopping, Mobility    PT AM-PAC:   /24  OT AM-PAC:   /24    Family can provide

## 2024-11-08 NOTE — PROGRESS NOTES
Telephone conversation with patient's son, Brandt. Updates provided and all questions answered at this time.      Electronically signed by Galdino Baig RN on 11/8/2024 at 11:01 AM

## 2024-11-08 NOTE — PROGRESS NOTES
SLP NOTE    Swallow evaluation re-attempted. Patient met at bedside; continues with diminished level of alertness. While with patient, patient with emesis episode. ST to hold swallow evaluation this date d/t current status with plan to re-attempt 11/9/2025 pending mental and GI statuses unless otherwise notified.    Thank you.  Adia Young MA, CCC-SLP, #[id number]  Speech-Language Pathologist  Portable phone: [phone]

## 2024-11-08 NOTE — PROGRESS NOTES
4 Eyes Skin Assessment     NAME:  Nyla Kaur  YOB: 1945  MEDICAL RECORD NUMBER:  [id number]    The patient is being assessed for  Admission    I agree that at least one RN has performed a thorough Head to Toe Skin Assessment on the patient. ALL assessment sites listed below have been assessed.      Areas assessed by both nurses:    Head, Face, Ears, Shoulders, Back, Chest, Arms, Elbows, Hands, Sacrum. Buttock, Coccyx, Ischium, Legs. Feet and Heels, and Under Medical Devices         Does the Patient have a Wound? No noted wound(s)       Sorin Prevention initiated by RN: Yes  Wound Care Orders initiated by RN: No    Pressure Injury (Stage 3,4, Unstageable, DTI, NWPT, and Complex wounds) if present, place Wound referral order by RN under : No    New Ostomies, if present place, Ostomy referral order under : No     Nurse 1 eSignature: Electronically signed by Galdino Baig RN on 11/8/24 at 8:26 AM EST    **SHARE this note so that the co-signing nurse can place an eSignature**    Nurse 2 eSignature: Electronically signed by Maria G Chaudhary RN on 11/8/24 at 9:30 AM EST

## 2024-11-08 NOTE — ED NOTES
Introduced self to patient.  Patient resting in the bed with side rails up, call light within reach.  No other needs at this time, updated on plan of care.  Patient was taken to CT with this RN and medic student, she tolerated well, did not wake up or open eyes.   at bedside and updated on POC

## 2024-11-08 NOTE — CONSULTS
Neurology Consult Note  Reason for Consult: moderate concussion    Chief complaint: limited given AMS    Dr Cox, Jd Carlton MD asked me to see Nyla Kaur in consultation for evaluation of moderate concussion    History of Present Illness:  I obtained my information via chart review given patients altered mental status; no visitors present.     Nyla Kaur is a 79 y.o. female with hx of Parkinson's Disease follows with  neurology, HTN, HLD, thyroid disease, see below for additional. She was just admitted and seen by neurology 10/23-10/25/24 for recurrent episodic unresponsiveness without definitive loss of consciousness, reported left facial droop and slurred speech. MRI brain was negative. There was suspicion of possible 2/2 effects to her neurodegenerative disorder. She had not yet followed up with neurology as OP.     She returns for admission on 11/8/24. The patient had reported a mechanical fall, falling backwards and hitting her head around 0030 early this calendar day. Since then she has had decreased level of consciousness, confusion, multiple episodes of vomiting concerning for concussion. She did have a head hematoma.     GCS was 2-3-4 on ED encounter. CT head was completed and negative for acute intracranial findings. Additionally noted to have hypertensive emergency. She was admitted to the ICU.     Discussed with RN. There has not been any seizure like activity. Patient has continued to have nausea and/or vomiting. She has received multiple doses of antiemetics.   Per RN she has been able to follow some commands a bit improved then what was reported to RN.          Medical History:  Past Medical History:   Diagnosis Date    Anxiety     Balance disorder     unknown etiology    GERD (gastroesophageal reflux disease)     History of kidney stones     Hx of blood clots     x 1 from use of BCPs    Hyperlipidemia     Hypertension     Infection 11/2015    gastric band    Liver cyst     \"possible\"     Olfactory nerve injury     mostly resolved    Right ear injury 20 yrs ago    Thyroid disease      Past Surgical History:   Procedure Laterality Date    ABDOMINOPLASTY  2012    also cosmetic surgery on arms    BACK SURGERY  1998    exc. cyst lumbar    CHOLECYSTECTOMY      COLONOSCOPY      COLONOSCOPY N/A 8/19/2019    COLONOSCOPY DIAGNOSTIC performed by Brandt Amin MD at [facility] END[facility]    COSMETIC SURGERY      breast reduction    CYSTOSCOPY Left 1/3/2023    CYSTOSCOPY LEFT STENT PLACEMENT performed by Matthew Nettles MD at [facility] OR    ENDOSCOPY, COLON, DIAGNOSTIC      EYE SURGERY      cataracts    FACIAL COSMETIC SURGERY  1986    also breast reduction    FACIAL COSMETIC SURGERY  [id number]     FACELIFT, BROWLIFT, UPPER AND LOWER BLEPHEROPLASTY AND    GASTRIC BAND  2011    also port removal subsequently (3/2015)    HYSTERECTOMY (CERVIX STATUS UNKNOWN)  1989    OTHER SURGICAL HISTORY  11/24/2015    LAPAROSCOPIC GASTRIC BAND REMOVAL         OVARIAN CYST SURGERY  1987    UTERINE FIBROID SURGERY  1974     Scheduled Meds:   sodium chloride  500 mL IntraVENous Once    sodium chloride flush  5-40 mL IntraVENous 2 times per day     Continuous Infusions:   sodium chloride       PRN Meds:.sodium chloride flush, sodium chloride, potassium chloride **OR** potassium alternative oral replacement **OR** potassium chloride, magnesium sulfate, ondansetron **OR** ondansetron, polyethylene glycol, acetaminophen **OR** acetaminophen, hydrALAZINE    Medications Prior to Admission: amLODIPine (NORVASC) 10 MG tablet, Take 1 tablet by mouth daily  potassium chloride (KLOR-CON M) 20 MEQ extended release tablet, Take 1 tablet by mouth daily  rivastigmine (EXELON) 6 MG capsule, Take 1 capsule by mouth 2 times daily  melatonin 3 MG TABS tablet, Take 1 tablet by mouth nightly as needed  acetaminophen (TYLENOL) 500 MG tablet, Take 2 tablets by mouth every 8 hours as needed for Pain  ibuprofen (ADVIL;MOTRIN) 600 MG tablet, Take 1 tablet by

## 2024-11-08 NOTE — PROGRESS NOTES
Patient more alert now and able to open eyes briefly when prompted. Hard to get a verbal response from patient. Could tell me that she had a mild headache, but wouldn't answer any questions when asked. Offered Tylenol for headache and asked patient if she would be able to tolerate PO intake and pt shook her head \"no\". Pt will intermittently follow commands with extremities, but moves them freely when not prompted.     Electronically signed by Galdino Baig RN on 11/8/2024 at 10:35 AM

## 2024-11-08 NOTE — PROGRESS NOTES
NAME:  Nyla Kaur  YOB: 1945  MEDICAL RECORD NUMBER:  [id number]    Shift Summary: To ED from home after witnessed fall by . Pt fell backwards and hit her head. Now having decreased LOC. Continued emesis which pt states she's had for a few days PTA. Head CT negative. CT cervical spine shows left posterior parietal scalp soft tissue hematoma. To ICU for Q4 Neuro checks. Hydralazine & Labetalol PRN for hypertension. Zofran & Phenergan for nausea. Neuro consulted. Navarro placed. Downgraded to PCU.    Family updated: Yes:   and son at bedside    Rhythm: Normal Sinus Rhythm     Most recent vitals:   Visit Vitals  BP (!) 147/65   Pulse 63   Temp 98 °F (36.7 °C) (Axillary)   Resp 21   Ht 1.676 m (5' 6\")   Wt 62.9 kg (138 lb 10.7 oz)   SpO2 93%   BMI 22.38 kg/m²           No data found.    No data found.      Respiratory support needed (if any):  - RA    Admission weight Weight - Scale: 65 kg (143 lb 6.4 oz) (11/08/24 0255)    Today's weight    Wt Readings from Last 1 Encounters:   11/08/24 62.9 kg (138 lb 10.7 oz)        Navarro need assessed each shift: YES -  - continue navarro r/t - urinary retention  UOP >30ml/hr: YES  Last documented BM (in last 48 hrs):  No data found.             Restraints (in use currently or dc'd in last 12 hrs): No    Order current and documentation up to date? No    Lines/Drains reviewed @ bedside.  Peripheral IV 11/08/24 Proximal;Right;Anterior Forearm (Active)   Number of days: 0       Peripheral IV 11/08/24 Left;Proximal;Anterior Forearm (Active)   Number of days: 0         Drip rates at handoff:    sodium chloride      sodium chloride 75 mL/hr at 11/08/24 1318       Lab Data:   CBC:   Recent Labs     11/08/24 0245   WBC 11.2*   HGB 13.9   HCT 41.2   MCV 95.7        BMP:    Recent Labs     11/08/24 0245      K 3.5   CO2 26   BUN 18   CREATININE 0.7     LIVR:   Recent Labs     11/08/24 0245   AST 15   ALT <5*     PT/INR: No results for input(s):  \"INR\" in the last 72 hours.    Invalid input(s): \"PROT\"  APTT: No results for input(s): \"APTT\" in the last 72 hours.  ABG: No results for input(s): \"PHART\", \"FZK5VXJ\", \"PO2ART\" in the last 72 hours.    Any consults during the shift? Yes: Consults received: : Neurology    Any signed and held orders to be released?  No        4 Eyes Skin Assessment       The patient is being assessed for  Shift Handoff    I agree that at least one RN has performed a thorough Head to Toe Skin Assessment on the patient. ALL assessment sites listed below have been assessed.      Areas assessed by both nurses: Head, Face, Ears, Shoulders, Back, Chest, Arms, Elbows, Hands, Sacrum. Buttock, Coccyx, Ischium, Legs. Feet and Heels, and Under Medical Devices         Does the Patient have a Wound? No noted wound(s)    Wound Care Orders initiated by RN: No       Sorin Prevention initiated by RN: Yes    Pressure Injury (Stage 3,4, Unstageable, DTI, NWPT, and Complex wounds) if present, place Wound referral order by RN under : No    New Ostomies, if present place, Ostomy referral order under : No     Nurse 1 eSignature: Electronically signed by Galdino Baig RN on 11/8/24 at 4:55 PM EST    **SHARE this note so that the co-signing nurse can place an eSignature**    Nurse 2 eSignature: Electronically signed by Shavonne Tabor RN on 11/8/24 at 5:37 PM EST

## 2024-11-08 NOTE — ED PROVIDER NOTES
Emergency Physician Note  [facility] EMERGENCY DEPARTMENT  [address] [address]  Dept: [phone]  Loc: [phone]  Open Note Time:  2:36 AM EST     Chief Complaint   Fall and Nausea (Pt fell around 0030 per  and has been having decreased LOC and emesis since then.  denies thinners )      Limitations of exam/history:  none     History of Present Illness   Nyla Kaur is a 79 y.o. female  has a past medical history of Anxiety, Balance disorder, GERD (gastroesophageal reflux disease), History of kidney stones, Hx of blood clots, Hyperlipidemia, Hypertension, Infection, Liver cyst, Olfactory nerve injury, Right ear injury, and Thyroid disease. who presents to the ED with a chief complaint of acute onset of decreased consciousness and vomiting.  Approximately 12:30 AM the patient had a mechanical fall and fell backwards, likely due to her Parkinson's.  Since then she has been having increasing confusion and decreased consciousness.  She is also had multiple episodes of vomiting prior to arrival.    Nursing notes reviewed.     Medical History   I have reviewed the following from the nursing documentation:      Prior to Admission medications    Medication Sig Start Date End Date Taking? Authorizing Provider   amLODIPine (NORVASC) 10 MG tablet Take 1 tablet by mouth daily 10/26/24   Zita Bell MD   potassium chloride (KLOR-CON M) 20 MEQ extended release tablet Take 1 tablet by mouth daily 10/25/24   Zita Bell MD   rivastigmine (EXELON) 6 MG capsule Take 1 capsule by mouth 2 times daily    ProviderMaureen MD   melatonin 3 MG TABS tablet Take 1 tablet by mouth nightly as needed    Maureen Suero MD   acetaminophen (TYLENOL) 500 MG tablet Take 2 tablets by mouth every 8 hours as needed for Pain 10/18/24 10/28/24  Nellie Chao PA-C   ibuprofen (ADVIL;MOTRIN) 600 MG tablet Take 1 tablet by mouth 3 times daily as

## 2024-11-08 NOTE — PROGRESS NOTES
NAME:  Nyla Kaur  YOB: 1945  MEDICAL RECORD NUMBER:  [id number]    Shift Summary: Patient resting comfortable through shift after receiving an additional dose of zofran. Navarro has adequate output. Patient does not open eyes when asked to but nods appropriately and is following commands. Patient son and  inquiring about po meds, reached out to Dr. Cox who feels it is best to wait for patient to be more alert and swallow eval completed.     Family updated: Yes:   Julián and son  Brandt    Rhythm: Normal Sinus Rhythm     Most recent vitals:   Visit Vitals  BP (!) 163/66   Pulse 68   Temp 98 °F (36.7 °C) (Axillary)   Resp 19   Ht 1.676 m (5' 6\")   Wt 62.9 kg (138 lb 10.7 oz)   SpO2 95%   BMI 22.38 kg/m²           No data found.    No data found.      Respiratory support needed (if any):  - RA    Admission weight Weight - Scale: 65 kg (143 lb 6.4 oz) (11/08/24 0255)    Today's weight    Wt Readings from Last 1 Encounters:   11/08/24 62.9 kg (138 lb 10.7 oz)        Navarro need assessed each shift: YES -  - continue navarro r/t - retention, accurate I/O  UOP >30ml/hr: YES  Last documented BM (in last 48 hrs):  No data found.             Restraints (in use currently or dc'd in last 12 hrs): No    Order current and documentation up to date? No    Lines/Drains reviewed @ bedside.  Peripheral IV 11/08/24 Proximal;Right;Anterior Forearm (Active)   Number of days: 0       Peripheral IV 11/08/24 Left;Proximal;Anterior Forearm (Active)   Number of days: 0       Urinary Catheter 11/08/24 Navarro (Active)   Number of days: 0         Drip rates at handoff:    sodium chloride      sodium chloride 75 mL/hr at 11/08/24 1318       Lab Data:   CBC:   Recent Labs     11/08/24 0245   WBC 11.2*   HGB 13.9   HCT 41.2   MCV 95.7        BMP:    Recent Labs     11/08/24  0245      K 3.5   CO2 26   BUN 18   CREATININE 0.7     LIVR:   Recent Labs     11/08/24 0245   AST 15   ALT <5*     PT/INR: No results  for input(s): \"INR\" in the last 72 hours.    Invalid input(s): \"PROT\"  APTT: No results for input(s): \"APTT\" in the last 72 hours.  ABG: No results for input(s): \"PHART\", \"OHL5BHJ\", \"PO2ART\" in the last 72 hours.    Any consults during the shift? No    Any signed and held orders to be released?  No        4 Eyes Skin Assessment       The patient is being assessed for  Shift Handoff    I agree that at least one RN has performed a thorough Head to Toe Skin Assessment on the patient. ALL assessment sites listed below have been assessed.      Areas assessed by both nurses: Head, Face, Ears, Shoulders, Back, Chest, Arms, Elbows, Hands, Sacrum. Buttock, Coccyx, Ischium, Legs. Feet and Heels, and Under Medical Devices         Does the Patient have a Wound? No noted wound(s)    Wound Care Orders initiated by RN: No       Sorin Prevention initiated by RN: Yes    Pressure Injury (Stage 3,4, Unstageable, DTI, NWPT, and Complex wounds) if present, place Wound referral order by RN under : No    New Ostomies, if present place, Ostomy referral order under : No     Nurse 1 eSignature: Electronically signed by Shavonne Tabor RN on 11/8/24 at 6:10 PM EST    **SHARE this note so that the co-signing nurse can place an eSignature**    Nurse 2 eSignature: Electronically signed by Lauren Beckham RN on 11/8/24 at 7:30 PM EST

## 2024-11-09 LAB
ALBUMIN SERPL-MCNC: 3.7 G/DL (ref 3.4–5)
ALP SERPL-CCNC: 63 U/L (ref 40–129)
ALT SERPL-CCNC: <5 U/L (ref 10–40)
ANION GAP SERPL CALCULATED.3IONS-SCNC: 11 MMOL/L (ref 3–16)
AST SERPL-CCNC: 11 U/L (ref 15–37)
BASOPHILS # BLD: 0 K/UL (ref 0–0.2)
BASOPHILS NFR BLD: 0.3 %
BILIRUB DIRECT SERPL-MCNC: 0.2 MG/DL (ref 0–0.3)
BILIRUB INDIRECT SERPL-MCNC: 0.2 MG/DL (ref 0–1)
BILIRUB SERPL-MCNC: 0.4 MG/DL (ref 0–1)
BUN SERPL-MCNC: 14 MG/DL (ref 7–20)
CALCIUM SERPL-MCNC: 8.7 MG/DL (ref 8.3–10.6)
CHLORIDE SERPL-SCNC: 108 MMOL/L (ref 99–110)
CO2 SERPL-SCNC: 23 MMOL/L (ref 21–32)
CREAT SERPL-MCNC: 0.6 MG/DL (ref 0.6–1.2)
DEPRECATED RDW RBC AUTO: 12.5 % (ref 12.4–15.4)
EOSINOPHIL # BLD: 0 K/UL (ref 0–0.6)
EOSINOPHIL NFR BLD: 0.3 %
GFR SERPLBLD CREATININE-BSD FMLA CKD-EPI: >90 ML/MIN/{1.73_M2}
GLUCOSE SERPL-MCNC: 84 MG/DL (ref 70–99)
HCT VFR BLD AUTO: 36.3 % (ref 36–48)
HGB BLD-MCNC: 12.7 G/DL (ref 12–16)
LYMPHOCYTES # BLD: 1.2 K/UL (ref 1–5.1)
LYMPHOCYTES NFR BLD: 10.4 %
MAGNESIUM SERPL-MCNC: 1.8 MG/DL (ref 1.8–2.4)
MCH RBC QN AUTO: 33.4 PG (ref 26–34)
MCHC RBC AUTO-ENTMCNC: 34.9 G/DL (ref 31–36)
MCV RBC AUTO: 95.6 FL (ref 80–100)
MONOCYTES # BLD: 0.7 K/UL (ref 0–1.3)
MONOCYTES NFR BLD: 6.4 %
NEUTROPHILS # BLD: 9.5 K/UL (ref 1.7–7.7)
NEUTROPHILS NFR BLD: 82.6 %
PLATELET # BLD AUTO: 219 K/UL (ref 135–450)
PMV BLD AUTO: 9.2 FL (ref 5–10.5)
POTASSIUM SERPL-SCNC: 3.3 MMOL/L (ref 3.5–5.1)
PROT SERPL-MCNC: 5.3 G/DL (ref 6.4–8.2)
RBC # BLD AUTO: 3.8 M/UL (ref 4–5.2)
SODIUM SERPL-SCNC: 142 MMOL/L (ref 136–145)
WBC # BLD AUTO: 11.5 K/UL (ref 4–11)

## 2024-11-09 PROCEDURE — 2580000003 HC RX 258: Performed by: HOSPITALIST

## 2024-11-09 PROCEDURE — 6360000002 HC RX W HCPCS: Performed by: HOSPITALIST

## 2024-11-09 PROCEDURE — 92610 EVALUATE SWALLOWING FUNCTION: CPT

## 2024-11-09 PROCEDURE — 80076 HEPATIC FUNCTION PANEL: CPT

## 2024-11-09 PROCEDURE — 94761 N-INVAS EAR/PLS OXIMETRY MLT: CPT

## 2024-11-09 PROCEDURE — 80048 BASIC METABOLIC PNL TOTAL CA: CPT

## 2024-11-09 PROCEDURE — 36415 COLL VENOUS BLD VENIPUNCTURE: CPT

## 2024-11-09 PROCEDURE — 85025 COMPLETE CBC W/AUTO DIFF WBC: CPT

## 2024-11-09 PROCEDURE — 6370000000 HC RX 637 (ALT 250 FOR IP): Performed by: HOSPITALIST

## 2024-11-09 PROCEDURE — 92523 SPEECH SOUND LANG COMPREHEN: CPT

## 2024-11-09 PROCEDURE — 2000000000 HC ICU R&B

## 2024-11-09 PROCEDURE — 83735 ASSAY OF MAGNESIUM: CPT

## 2024-11-09 RX ORDER — SERTRALINE HYDROCHLORIDE 100 MG/1
100 TABLET, FILM COATED ORAL NIGHTLY
Status: DISCONTINUED | OUTPATIENT
Start: 2024-11-09 | End: 2024-11-15 | Stop reason: HOSPADM

## 2024-11-09 RX ORDER — CARBIDOPA AND LEVODOPA 25; 100 MG/1; MG/1
3 TABLET ORAL 4 TIMES DAILY
Status: DISCONTINUED | OUTPATIENT
Start: 2024-11-09 | End: 2024-11-15 | Stop reason: HOSPADM

## 2024-11-09 RX ORDER — LEVOTHYROXINE SODIUM 25 UG/1
25 TABLET ORAL DAILY
Status: DISCONTINUED | OUTPATIENT
Start: 2024-11-09 | End: 2024-11-15 | Stop reason: HOSPADM

## 2024-11-09 RX ORDER — ATORVASTATIN CALCIUM 20 MG/1
20 TABLET, FILM COATED ORAL NIGHTLY
Status: DISCONTINUED | OUTPATIENT
Start: 2024-11-09 | End: 2024-11-15 | Stop reason: HOSPADM

## 2024-11-09 RX ORDER — ENOXAPARIN SODIUM 100 MG/ML
40 INJECTION SUBCUTANEOUS DAILY
Status: DISCONTINUED | OUTPATIENT
Start: 2024-11-09 | End: 2024-11-10

## 2024-11-09 RX ORDER — PANTOPRAZOLE SODIUM 40 MG/1
40 TABLET, DELAYED RELEASE ORAL
Status: DISCONTINUED | OUTPATIENT
Start: 2024-11-10 | End: 2024-11-15 | Stop reason: HOSPADM

## 2024-11-09 RX ORDER — AMLODIPINE BESYLATE 10 MG/1
10 TABLET ORAL DAILY
Status: DISCONTINUED | OUTPATIENT
Start: 2024-11-09 | End: 2024-11-15 | Stop reason: HOSPADM

## 2024-11-09 RX ORDER — POTASSIUM CHLORIDE 7.45 MG/ML
10 INJECTION INTRAVENOUS
Status: COMPLETED | OUTPATIENT
Start: 2024-11-09 | End: 2024-11-09

## 2024-11-09 RX ADMIN — ATORVASTATIN CALCIUM 20 MG: 20 TABLET, FILM COATED ORAL at 20:15

## 2024-11-09 RX ADMIN — SODIUM CHLORIDE: 9 INJECTION, SOLUTION INTRAVENOUS at 02:22

## 2024-11-09 RX ADMIN — ONDANSETRON 4 MG: 2 INJECTION INTRAMUSCULAR; INTRAVENOUS at 02:16

## 2024-11-09 RX ADMIN — LEVOTHYROXINE SODIUM 25 MCG: 0.03 TABLET ORAL at 13:36

## 2024-11-09 RX ADMIN — CARBIDOPA AND LEVODOPA 3 TABLET: 25; 100 TABLET ORAL at 17:23

## 2024-11-09 RX ADMIN — ONDANSETRON 4 MG: 2 INJECTION INTRAMUSCULAR; INTRAVENOUS at 12:34

## 2024-11-09 RX ADMIN — CARBIDOPA AND LEVODOPA 3 TABLET: 25; 100 TABLET ORAL at 13:36

## 2024-11-09 RX ADMIN — AMLODIPINE BESYLATE 10 MG: 10 TABLET ORAL at 13:36

## 2024-11-09 RX ADMIN — CARBIDOPA AND LEVODOPA 3 TABLET: 25; 100 TABLET ORAL at 20:15

## 2024-11-09 RX ADMIN — ENOXAPARIN SODIUM 40 MG: 100 INJECTION SUBCUTANEOUS at 13:36

## 2024-11-09 RX ADMIN — SODIUM CHLORIDE, PRESERVATIVE FREE 10 ML: 5 INJECTION INTRAVENOUS at 20:15

## 2024-11-09 RX ADMIN — POTASSIUM CHLORIDE 10 MEQ: 7.46 INJECTION, SOLUTION INTRAVENOUS at 09:01

## 2024-11-09 RX ADMIN — POTASSIUM CHLORIDE 10 MEQ: 7.46 INJECTION, SOLUTION INTRAVENOUS at 07:34

## 2024-11-09 RX ADMIN — POTASSIUM CHLORIDE 10 MEQ: 7.46 INJECTION, SOLUTION INTRAVENOUS at 06:23

## 2024-11-09 RX ADMIN — SERTRALINE 100 MG: 100 TABLET, FILM COATED ORAL at 20:15

## 2024-11-09 RX ADMIN — POTASSIUM CHLORIDE 10 MEQ: 7.46 INJECTION, SOLUTION INTRAVENOUS at 09:57

## 2024-11-09 RX ADMIN — PROMETHAZINE HYDROCHLORIDE 12.5 MG: 25 INJECTION INTRAMUSCULAR; INTRAVENOUS at 14:00

## 2024-11-09 RX ADMIN — POTASSIUM CHLORIDE 10 MEQ: 7.46 INJECTION, SOLUTION INTRAVENOUS at 08:35

## 2024-11-09 ASSESSMENT — PAIN SCALES - GENERAL
PAINLEVEL_OUTOF10: 0

## 2024-11-09 NOTE — PROGRESS NOTES
bases are again demonstrated compatible with fibrotic lung process.  Overall this appears similar compared to the prior exam. Organs: The liver, pancreas, spleen, kidneys, and adrenals reveal no acute findings. No inflammatory change identified in the gallbladder fossa. GI/Bowel: Liquid stool is present throughout the colon.  The sigmoid colon takes an unusual course towards the right abdomen and there is mild swirling of the mesentery noted without twisting to suggest volvulus.  Loops of colon are mildly distended with gas.  No significant small bowel distension is appreciated.  No wall thickening or inflammatory change.  No pneumatosis or portal venous gas. Pelvis: Mild diffuse bladder wall thickening. Peritoneum/Retroperitoneum: No free air or free fluid.  The aorta is normal in caliber.  The visceral branches are patent. Calcified atheromatous plaque is present.  No lymphadenopathy. Bones/Soft Tissues: Small fat containing umbilical hernia.  Severe compression deformity of L2 is again noted.     1.  Findings compatible diarrheal process.  Mild colonic distension is noted with mild mesenteric swirling but no evidence for volvulus.  This may be due to a partial obstructing process due to an underlying internal hernia.  No significant small bowel distension. 2.  Additional chronic and benign findings, as described.       CBC:   Recent Labs     11/08/24 0245 11/09/24 0427   WBC 11.2* 11.5*   HGB 13.9 12.7    219     BMP:    Recent Labs     11/08/24 0245 11/09/24 0427    142   K 3.5 3.3*    108   CO2 26 23   BUN 18 14   CREATININE 0.7 0.6   GLUCOSE 163* 84     Hepatic:   Recent Labs     11/08/24 0245 11/09/24 0427   AST 15 11*   ALT <5* <5*   BILITOT 0.3 0.4   ALKPHOS 79 63     Lipids:   Lab Results   Component Value Date/Time    CHOL 137 10/24/2024 05:27 AM    HDL 47 10/24/2024 05:27 AM    HDL 58 05/23/2012 01:00 PM    TRIG 90 10/24/2024 05:27 AM     Hemoglobin A1C:   Lab Results   Component  Value Date/Time    LABA1C 5.5 10/24/2024 05:27 AM     TSH: No results found for: \"TSH\"  Troponin: No results found for: \"TROPONINT\"  Lactic Acid: No results for input(s): \"LACTA\" in the last 72 hours.  BNP:   No results for input(s): \"PROBNP\" in the last 72 hours.  UA:  Lab Results   Component Value Date/Time    NITRU Negative 11/08/2024 08:41 AM    COLORU Yellow 11/08/2024 08:41 AM    PHUR 5.5 11/08/2024 08:41 AM    PHUR 6.0 01/02/2023 10:36 PM    WBCUA 2 11/08/2024 08:41 AM    RBCUA 1 11/08/2024 08:41 AM    MUCUS Present 11/08/2024 08:41 AM    BACTERIA 4+ 11/08/2024 08:41 AM    CLARITYU CLOUDY 11/08/2024 08:41 AM    LEUKOCYTESUR Negative 11/08/2024 08:41 AM    UROBILINOGEN 1.0 11/08/2024 08:41 AM    BILIRUBINUR Negative 11/08/2024 08:41 AM    BLOODU Negative 11/08/2024 08:41 AM    GLUCOSEU Negative 11/08/2024 08:41 AM    KETUA 15 11/08/2024 08:41 AM     Urine Cultures:   Lab Results   Component Value Date/Time    LABURIN <10,000 CFU/ml  No further workup   07/29/2022 05:20 PM     Blood Cultures: No results found for: \"BC\"  No results found for: \"BLOODCULT2\"  Organism:   Lab Results   Component Value Date/Time    ORG Gram negative nadya 07/29/2022 05:20 PM         Assessment and Recommendations   Nyla Kaur is a 79 y.o. female who presents with fall with closed head injury and concussion    Closed head injury s/p fall  Postconcussion syndrome--persistent altered mental status status post head injury--improving, CT head without acute intracranial issues, has scalp hematoma,     PT and OT eval's  MRI brain per neurology given persistent symptoms  Hypertensive urgency  /89 on presentation..  BP improved  Resume amlodipine    -Parkinson's disease-continue Sinemet  -Cognitive impairment--on Exelon patch--hold for now given patient's altered mental status  -Hypothyroidism-continue Synthroid      Diet ADULT DIET; Dysphagia - Soft and Bite Sized  ADULT ORAL NUTRITION SUPPLEMENT; Breakfast, Lunch, Dinner; Standard

## 2024-11-09 NOTE — PROGRESS NOTES
Call placed with message left for neurology.  Notes yesterday state MRI ordered.  Chart review shows no order.  Seeking order clarification

## 2024-11-09 NOTE — PLAN OF CARE
Problem: Discharge Planning  Goal: Discharge to home or other facility with appropriate resources  11/9/2024 0926 by Kenna Blunt RN  Outcome: Progressing  Flowsheets (Taken 11/9/2024 0800)  Discharge to home or other facility with appropriate resources:   Arrange for needed discharge resources and transportation as appropriate   Identify barriers to discharge with patient and caregiver   Identify discharge learning needs (meds, wound care, etc)   Refer to discharge planning if patient needs post-hospital services based on physician order or complex needs related to functional status, cognitive ability or social support system     Problem: Pain  Goal: Verbalizes/displays adequate comfort level or baseline comfort level  11/9/2024 0926 by Kenna Blunt RN  Outcome: Progressing  Flowsheets (Taken 11/9/2024 0801)  Verbalizes/displays adequate comfort level or baseline comfort level:   Encourage patient to monitor pain and request assistance   Assess pain using appropriate pain scale   Administer analgesics based on type and severity of pain and evaluate response   Implement non-pharmacological measures as appropriate and evaluate response     Problem: Safety - Adult  Goal: Free from fall injury  11/9/2024 0926 by Kenna Blunt RN  Outcome: Progressing     Problem: Skin/Tissue Integrity  Goal: Absence of new skin breakdown  Description: 1.  Monitor for areas of redness and/or skin breakdown  2.  Assess vascular access sites hourly  3.  Every 4-6 hours minimum:  Change oxygen saturation probe site  4.  Every 4-6 hours:  If on nasal continuous positive airway pressure, respiratory therapy assess nares and determine need for appliance change or resting period.  11/9/2024 0926 by Kenna Blunt RN  Outcome: Progressing     Problem: Respiratory - Adult  Goal: Achieves optimal ventilation and oxygenation  11/9/2024 0926 by Kenna Blunt RN  Outcome: Progressing  Flowsheets (Taken 11/9/2024

## 2024-11-09 NOTE — PROGRESS NOTES
Facility/Department: [address] ICU  SLP Clinical Swallow Evaluation and Speech Language Cognitive Assessment     Patient: Nyla Kaur   : 1945   MRN: [id number]      Evaluation Date: 2024      Admitting Dx:   Post concussive encephalopathy [F07.81]  Concussion syndrome [F07.81]  Hypertensive emergency [I16.1]  Closed head injury, initial encounter [S09.90XA]  Nausea and vomiting, unspecified vomiting type [R11.2]   has a past medical history of Anxiety, Balance disorder, GERD (gastroesophageal reflux disease), History of kidney stones, blood clots, Hyperlipidemia, Hypertension, Infection (2015), Liver cyst, Olfactory nerve injury, Right ear injury (20 yrs ago), and Thyroid disease.   has a past surgical history that includes Abdominoplasty (); Hysterectomy (); Facial cosmetic surgery (); Ovarian cyst surgery (); Uterine fibroid surgery (); back surgery (); Gastric Band (); Colonoscopy; Facial cosmetic surgery ([id number]); Cosmetic surgery; Cholecystectomy; Endoscopy, colon, diagnostic; eye surgery; other surgical history (2015); Colonoscopy (N/A, 2019); and Cystoscopy (Left, 1/3/2023).  Allergies: medication allergies noted  Speech Therapy History: None per EMR  Dysphagia History: Pt previously evaluated by [facility], recommended regular diet with thin liquids   GI history: No recent/relevant  ENT history: Follows with ENT for SNHL   History/Prior Level of Function: Living Status: Pt resides with spouse in their home. Baseline diet: regular/thin, medications crushed in puree.                          Onset: 2024     Reason for referral: SLP evaluation orders received due to TBI/Concussion.    CURRENT ENCOUNTER DIAGNOSTICS/COURSE OF ADMISSION     H&P: Pt is an 80 y/o F with PMHx significant for PD, GERD, HTN, and anxiety who presents s/p fall. Reduced LIDIA since fall and emesis.     Consults: Neurology    Imaging:  Head CT:  24 \"IMPRESSION:  1. No acute  of drink  Voice: unremarkable  Dentition: Adequate    Oral Phase: Deficit areas of concern:  Impaired/Prolonged Mastication: Regular texture    Pharyngeal Phase WFL  No overt s/s of aspiration appreciated across all PO intake.     Pt reporting s/s of concern for Esophageal problems: h/o GERD    COMPREHENSION  Auditory Comprehension:  WFL  Visual Language Processing: WFL    EXPRESSION  Verbal Expressive Language:  WFL  Written Expressive Language:   Not assessed this date  Pragmatics/Social Functioning: Flat Affect- likely 2/2 PD      MOTOR SPEECH/VOICE:    Motor Speech:  Motor speech appeared to be grossly WNL with no dysarthria or apraxia assessed.  Voice: WNL     COGNITION    Overall Orientation:  WFL  Attention:  Impaired Sustained Attention, Impaired Alternating Attention, Impaired Divided Attention  Memory: Impaired Short-term Memory, Impaired Immediate/working Memory  Problem Solving: Impaired Verbal Reasoning, Impaired Complex Tasks   Math Language/numeric reasoning:  Not assessed this date  Safety/Judgement:  Not assessed this date    EDUCATION     Provided education regarding role of SLP, results of assessment, recommendations and general speech pathology plan of care.   Patient Response: Pt verbalized understanding and agreement   Family education: Family not present/unavailable  Staff response: RN/MD aware of SLP recommendations    If patient discharges prior to next visit, this note will serve as discharge.     Time code minutes: 0  Total Time:  36    Electronically signed by:    Mariam Yun M.A., [facility]-SLP  Speech-Language Pathologist  [facility]  [phone]    11/09/24 9:45 AM

## 2024-11-09 NOTE — PROGRESS NOTES
NAME:  Nyla Kaur  YOB: 1945  MEDICAL RECORD NUMBER:  [id number]    Shift Summary: Patient placed on 1 LPM tthis afternoon.     Family updated: Yes:  Brandt and Julián at bedside    Rhythm: Normal Sinus Rhythm     Most recent vitals:   Visit Vitals  BP (!) 163/64   Pulse 65   Temp 98.6 °F (37 °C) (Oral)   Resp 20   Ht 1.676 m (5' 5.98\")   Wt 62 kg (136 lb 11 oz)   SpO2 93%   BMI 22.07 kg/m²           No data found.    No data found.      Respiratory support needed (if any):  - O2 - NC - 1 lpm    Admission weight Weight - Scale: 65 kg (143 lb 6.4 oz) (11/08/24 0255)    Today's weight    Wt Readings from Last 1 Encounters:   11/09/24 62 kg (136 lb 11 oz)        Naavrro need assessed each shift: YES -  - continue navarro r/t - retention  UOP >30ml/hr: YES  Last documented BM (in last 48 hrs):  No data found.             Restraints (in use currently or dc'd in last 12 hrs): No    Order current and documentation up to date? No    Lines/Drains reviewed @ bedside.  Peripheral IV Right Antecubital (Active)   Number of days:        Urinary Catheter 11/08/24 Navarro (Active)   Number of days: 0         Drip rates at handoff:    sodium chloride         Lab Data:   CBC:   Recent Labs     11/08/24 0245 11/09/24 0427   WBC 11.2* 11.5*   HGB 13.9 12.7   HCT 41.2 36.3   MCV 95.7 95.6    219     BMP:    Recent Labs     11/08/24 0245 11/09/24 0427    142   K 3.5 3.3*   CO2 26 23   BUN 18 14   CREATININE 0.7 0.6     LIVR:   Recent Labs     11/08/24 0245 11/09/24 0427   AST 15 11*   ALT <5* <5*     PT/INR: No results for input(s): \"INR\" in the last 72 hours.    Invalid input(s): \"PROT\"  APTT: No results for input(s): \"APTT\" in the last 72 hours.  ABG: No results for input(s): \"PHART\", \"DHD7OEY\", \"PO2ART\" in the last 72 hours.    Any consults during the shift? Yes: Consults received: : PT/OT    Any signed and held orders to be released?  No        4 Eyes Skin Assessment       The patient is being assessed

## 2024-11-09 NOTE — PROGRESS NOTES
Patients  states the last time the patient had this much nausea an Epley maneuver was performed on her and helped her greatly.  He was unsure of when or where this happened.

## 2024-11-09 NOTE — PROGRESS NOTES
NAME:  Nyla Kaur  YOB: 1945  MEDICAL RECORD NUMBER:  [id number]    Shift Summary: Q4 neuro checks done. More alert and interactive to questions. Nausea improving, had Zofran and Phenergan overnight. Remains NPO, plan for swallow evaluation today.    Family updated: No    Rhythm: Normal Sinus Rhythm     Most recent vitals:   Visit Vitals  BP (!) 143/58   Pulse 64   Temp 98.2 °F (36.8 °C) (Axillary)   Resp 17   Ht 1.676 m (5' 5.98\")   Wt 62 kg (136 lb 11 oz)   SpO2 90%   BMI 22.07 kg/m²           No data found.    No data found.      Respiratory support needed (if any):  - RA    Admission weight Weight - Scale: 65 kg (143 lb 6.4 oz) (11/08/24 0255)    Today's weight    Wt Readings from Last 1 Encounters:   11/09/24 62 kg (136 lb 11 oz)        Navarro need assessed each shift: YES -  - continue navarro r/t - urinary retention  UOP >30ml/hr: YES  Last documented BM (in last 48 hrs):  No data found.             Restraints (in use currently or dc'd in last 12 hrs): No    Order current and documentation up to date? No    Lines/Drains reviewed @ bedside.  Peripheral IV 11/08/24 Proximal;Right;Anterior Forearm (Active)   Number of days: 1       Peripheral IV 11/08/24 Left;Proximal;Anterior Forearm (Active)   Number of days: 1       Urinary Catheter 11/08/24 Navarro (Active)   Number of days: 0         Drip rates at handoff:    sodium chloride      sodium chloride 75 mL/hr at 11/09/24 0223       Lab Data:   CBC:   Recent Labs     11/08/24 0245 11/09/24 0427   WBC 11.2* 11.5*   HGB 13.9 12.7   HCT 41.2 36.3   MCV 95.7 95.6    219     BMP:    Recent Labs     11/08/24 0245 11/09/24 0427    142   K 3.5 3.3*   CO2 26 23   BUN 18 14   CREATININE 0.7 0.6     LIVR:   Recent Labs     11/08/24 0245 11/09/24 0427   AST 15 11*   ALT <5* <5*     PT/INR: No results for input(s): \"INR\" in the last 72 hours.    Invalid input(s): \"PROT\"  APTT: No results for input(s): \"APTT\" in the last 72 hours.  ABG: No  results for input(s): \"PHART\", \"LRH5IIV\", \"PO2ART\" in the last 72 hours.    Any consults during the shift? No    Any signed and held orders to be released?  No        4 Eyes Skin Assessment       The patient is being assessed for  Shift Handoff    I agree that at least one RN has performed a thorough Head to Toe Skin Assessment on the patient. ALL assessment sites listed below have been assessed.      Areas assessed by both nurses: Head, Face, Ears, Shoulders, Back, Chest, Arms, Elbows, Hands, Sacrum. Buttock, Coccyx, Ischium, Legs. Feet and Heels, and Under Medical Devices         Does the Patient have a Wound? No noted wound(s)    Wound Care Orders initiated by RN: No       Sorin Prevention initiated by RN: Yes    Pressure Injury (Stage 3,4, Unstageable, DTI, NWPT, and Complex wounds) if present, place Wound referral order by RN under : No    New Ostomies, if present place, Ostomy referral order under : No     Nurse 1 eSignature: Electronically signed by Lauren Beckham RN on 11/9/24 at 07:20 AM EST    **SHARE this note so that the co-signing nurse can place an eSignature**    Nurse 2 eSignature: Electronically signed by Kenna Blunt RN on 11/9/24 at 7:31 AM EST

## 2024-11-09 NOTE — PLAN OF CARE
Problem: Pain  Goal: Verbalizes/displays adequate comfort level or baseline comfort level  11/8/2024 2316 by Lauren Beckham, RN  Outcome: Progressing  Flowsheets (Taken 11/8/2024 2029)  Verbalizes/displays adequate comfort level or baseline comfort level:   Encourage patient to monitor pain and request assistance   Assess pain using appropriate pain scale   Administer analgesics based on type and severity of pain and evaluate response   Implement non-pharmacological measures as appropriate and evaluate response     Problem: Safety - Adult  Goal: Free from fall injury  11/8/2024 2316 by Lauren Beckham, RN  Outcome: Progressing  Flowsheets (Taken 11/8/2024 2000)  Free From Fall Injury: Instruct family/caregiver on patient safety     Problem: Respiratory - Adult  Goal: Achieves optimal ventilation and oxygenation  Outcome: Progressing  Flowsheets (Taken 11/8/2024 2029)  Achieves optimal ventilation and oxygenation:   Assess for changes in respiratory status   Assess for changes in mentation and behavior   Position to facilitate oxygenation and minimize respiratory effort   Encourage broncho-pulmonary hygiene including cough, deep breathe, incentive spirometry   Assess and instruct to report shortness of breath or any respiratory difficulty     Problem: Cardiovascular - Adult  Goal: Maintains optimal cardiac output and hemodynamic stability  Outcome: Progressing  Flowsheets (Taken 11/8/2024 2029)  Maintains optimal cardiac output and hemodynamic stability:   Monitor blood pressure and heart rate   Monitor urine output and notify Licensed Independent Practitioner for values outside of normal range   Assess for signs of decreased cardiac output   Administer fluid and/or volume expanders as ordered     Problem: Cardiovascular - Adult  Goal: Absence of cardiac dysrhythmias or at baseline  Outcome: Progressing  Flowsheets (Taken 11/8/2024 2029)  Absence of cardiac dysrhythmias or at baseline:   Monitor cardiac rate and

## 2024-11-09 NOTE — PROGRESS NOTES
Patient with O2 requirements while she was sleeping at 1-2lpm.  Her resprations are averaging 25 BPM.   thinks she may be having some anxiety.  Says with her parkinsons she she has states where she falls asleep and then dreams about when she was in school about 30 years ago.  When she wakes up there are times that the dream state seems to continue like a hallucination.  She develops anxiety during this phase.    Previous C/S

## 2024-11-10 ENCOUNTER — APPOINTMENT (OUTPATIENT)
Dept: CT IMAGING | Age: 79
End: 2024-11-10
Payer: MEDICARE

## 2024-11-10 PROBLEM — I26.94 MULTIPLE SUBSEGMENTAL PULMONARY EMBOLI WITHOUT ACUTE COR PULMONALE (HCC): Status: ACTIVE | Noted: 2024-11-10

## 2024-11-10 LAB
ANTI-XA UNFRAC HEPARIN: <0.1 IU/ML (ref 0.3–0.7)
GLUCOSE BLD-MCNC: 110 MG/DL (ref 70–99)
PERFORMED ON: ABNORMAL
POTASSIUM SERPL-SCNC: 4 MMOL/L (ref 3.5–5.1)

## 2024-11-10 PROCEDURE — 85520 HEPARIN ASSAY: CPT

## 2024-11-10 PROCEDURE — 84132 ASSAY OF SERUM POTASSIUM: CPT

## 2024-11-10 PROCEDURE — 36415 COLL VENOUS BLD VENIPUNCTURE: CPT

## 2024-11-10 PROCEDURE — 97530 THERAPEUTIC ACTIVITIES: CPT

## 2024-11-10 PROCEDURE — 70450 CT HEAD/BRAIN W/O DYE: CPT

## 2024-11-10 PROCEDURE — 1200000000 HC SEMI PRIVATE

## 2024-11-10 PROCEDURE — 6370000000 HC RX 637 (ALT 250 FOR IP): Performed by: HOSPITALIST

## 2024-11-10 PROCEDURE — 97162 PT EVAL MOD COMPLEX 30 MIN: CPT

## 2024-11-10 PROCEDURE — 2580000003 HC RX 258: Performed by: HOSPITALIST

## 2024-11-10 PROCEDURE — 2700000000 HC OXYGEN THERAPY PER DAY

## 2024-11-10 PROCEDURE — 94761 N-INVAS EAR/PLS OXIMETRY MLT: CPT

## 2024-11-10 PROCEDURE — 6360000002 HC RX W HCPCS: Performed by: HOSPITALIST

## 2024-11-10 PROCEDURE — 70498 CT ANGIOGRAPHY NECK: CPT

## 2024-11-10 PROCEDURE — 6360000002 HC RX W HCPCS: Performed by: NURSE PRACTITIONER

## 2024-11-10 PROCEDURE — 2060000000 HC ICU INTERMEDIATE R&B

## 2024-11-10 PROCEDURE — 6360000004 HC RX CONTRAST MEDICATION: Performed by: HOSPITALIST

## 2024-11-10 PROCEDURE — 71275 CT ANGIOGRAPHY CHEST: CPT

## 2024-11-10 RX ORDER — HEPARIN SODIUM 10000 [USP'U]/100ML
0-4000 INJECTION, SOLUTION INTRAVENOUS CONTINUOUS
Status: DISCONTINUED | OUTPATIENT
Start: 2024-11-10 | End: 2024-11-12

## 2024-11-10 RX ORDER — IOPAMIDOL 755 MG/ML
75 INJECTION, SOLUTION INTRAVASCULAR
Status: COMPLETED | OUTPATIENT
Start: 2024-11-10 | End: 2024-11-10

## 2024-11-10 RX ORDER — HEPARIN SODIUM 1000 [USP'U]/ML
40 INJECTION, SOLUTION INTRAVENOUS; SUBCUTANEOUS PRN
Status: DISCONTINUED | OUTPATIENT
Start: 2024-11-10 | End: 2024-11-10

## 2024-11-10 RX ORDER — HEPARIN SODIUM 1000 [USP'U]/ML
80 INJECTION, SOLUTION INTRAVENOUS; SUBCUTANEOUS PRN
Status: DISCONTINUED | OUTPATIENT
Start: 2024-11-10 | End: 2024-11-10

## 2024-11-10 RX ORDER — SODIUM CHLORIDE 9 MG/ML
INJECTION, SOLUTION INTRAVENOUS CONTINUOUS
Status: ACTIVE | OUTPATIENT
Start: 2024-11-10 | End: 2024-11-11

## 2024-11-10 RX ORDER — VITAMIN B COMPLEX
1000 TABLET ORAL DAILY
Status: DISCONTINUED | OUTPATIENT
Start: 2024-11-10 | End: 2024-11-15 | Stop reason: HOSPADM

## 2024-11-10 RX ORDER — ASPIRIN 81 MG/1
81 TABLET, CHEWABLE ORAL DAILY
Status: DISCONTINUED | OUTPATIENT
Start: 2024-11-10 | End: 2024-11-15 | Stop reason: HOSPADM

## 2024-11-10 RX ORDER — RIVASTIGMINE TARTRATE 1.5 MG/1
6 CAPSULE ORAL 2 TIMES DAILY
Status: DISCONTINUED | OUTPATIENT
Start: 2024-11-10 | End: 2024-11-15 | Stop reason: HOSPADM

## 2024-11-10 RX ORDER — HEPARIN SODIUM 1000 [USP'U]/ML
5000 INJECTION, SOLUTION INTRAVENOUS; SUBCUTANEOUS ONCE
Status: COMPLETED | OUTPATIENT
Start: 2024-11-10 | End: 2024-11-10

## 2024-11-10 RX ADMIN — ATORVASTATIN CALCIUM 20 MG: 20 TABLET, FILM COATED ORAL at 22:28

## 2024-11-10 RX ADMIN — SODIUM CHLORIDE: 9 INJECTION, SOLUTION INTRAVENOUS at 18:11

## 2024-11-10 RX ADMIN — SERTRALINE 100 MG: 100 TABLET, FILM COATED ORAL at 22:28

## 2024-11-10 RX ADMIN — LABETALOL HYDROCHLORIDE 5 MG: 5 INJECTION INTRAVENOUS at 16:55

## 2024-11-10 RX ADMIN — Medication 1000 UNITS: at 18:17

## 2024-11-10 RX ADMIN — CARBIDOPA AND LEVODOPA 3 TABLET: 25; 100 TABLET ORAL at 08:58

## 2024-11-10 RX ADMIN — RIVASTIGMINE TARTRATE 6 MG: 1.5 CAPSULE ORAL at 14:02

## 2024-11-10 RX ADMIN — LEVOTHYROXINE SODIUM 25 MCG: 0.03 TABLET ORAL at 08:58

## 2024-11-10 RX ADMIN — ENOXAPARIN SODIUM 40 MG: 100 INJECTION SUBCUTANEOUS at 08:59

## 2024-11-10 RX ADMIN — IOPAMIDOL 75 ML: 755 INJECTION, SOLUTION INTRAVENOUS at 16:33

## 2024-11-10 RX ADMIN — AMLODIPINE BESYLATE 10 MG: 10 TABLET ORAL at 08:58

## 2024-11-10 RX ADMIN — CARBIDOPA AND LEVODOPA 3 TABLET: 25; 100 TABLET ORAL at 16:55

## 2024-11-10 RX ADMIN — HEPARIN SODIUM 1150 UNITS/HR: 10000 INJECTION, SOLUTION INTRAVENOUS at 22:26

## 2024-11-10 RX ADMIN — IOPAMIDOL 75 ML: 755 INJECTION, SOLUTION INTRAVENOUS at 18:27

## 2024-11-10 RX ADMIN — SODIUM CHLORIDE, PRESERVATIVE FREE 10 ML: 5 INJECTION INTRAVENOUS at 22:33

## 2024-11-10 RX ADMIN — ASPIRIN 81 MG: 81 TABLET, CHEWABLE ORAL at 18:17

## 2024-11-10 RX ADMIN — CARBIDOPA AND LEVODOPA 3 TABLET: 25; 100 TABLET ORAL at 22:28

## 2024-11-10 RX ADMIN — PANTOPRAZOLE SODIUM 40 MG: 40 TABLET, DELAYED RELEASE ORAL at 08:58

## 2024-11-10 RX ADMIN — SODIUM CHLORIDE, PRESERVATIVE FREE 10 ML: 5 INJECTION INTRAVENOUS at 08:59

## 2024-11-10 RX ADMIN — CARBIDOPA AND LEVODOPA 3 TABLET: 25; 100 TABLET ORAL at 12:50

## 2024-11-10 RX ADMIN — HEPARIN SODIUM 5000 UNITS: 1000 INJECTION INTRAVENOUS; SUBCUTANEOUS at 22:22

## 2024-11-10 ASSESSMENT — PAIN SCALES - GENERAL
PAINLEVEL_OUTOF10: 0
PAINLEVEL_OUTOF10: 0

## 2024-11-10 NOTE — CODE DOCUMENTATION
Code stroke called d/t patient reporting decreased sensation in right side. Transferring from chair to bed at this time.

## 2024-11-10 NOTE — PROGRESS NOTES
Assessment  AROM: Generally decreased, functional  Strength: Generally decreased, functional                   Bed mobility  Supine to Sit: Moderate assistance (HOB elevated.)  Sit to Supine: Moderate assistance  Bed Mobility Comments: To/from eob with Mod assist; delayed with pt engage.  Transfers  Sit to Stand: Minimal Assistance;2 Person Assistance  Stand to Sit: Minimal Assistance;2 Person Assistance  Ambulation  Surface: Level tile  Device: No Device  Distance: Pt amb 3-4 small steps eob to/from recliner with Min/Hand held assist x 2. No LE buckling although diminished awareness optimal positioning as sitting down at recliner, eob.  Comments: Defer remain oob in chair per nursing request; anticipate transfer out of ICU.     Balance  Comments: EOB : CGA although did attempt to abruptly lean/fall back into bed.  Upon arrival at chair, pt attempt lean/fall back prior scooting back further in chair.          AM-PAC - Mobility    AM-PAC Basic Mobility - Inpatient   How much help is needed turning from your back to your side while in a flat bed without using bedrails?: A Little  How much help is needed moving from lying on your back to sitting on the side of a flat bed without using bedrails?: A Lot  How much help is needed moving to and from a bed to a chair?: A Little  How much help is needed standing up from a chair using your arms?: A Little  How much help is needed walking in hospital room?: A Lot  How much help is needed climbing 3-5 steps with a railing?: Total  AM-PAC Inpatient Mobility Raw Score : 14  AM-PAC Inpatient T-Scale Score : 38.1  Mobility Inpatient CMS 0-100% Score: 61.29  Mobility Inpatient CMS G-Code Modifier : CL         Goals  Short Term Goals  Time Frame for Short Term Goals: Upon d/c acute care setting.  Short Term Goal 1: Bed Mob SBA/Supervision.  Short Term Goal 2: Tranfers with/without assist device SBA.  Short Term Goal 3: Amb with/without assist device ' SBA/CGA.  Patient Goals    Patient Goals : None stated at this time.       Education  Patient Education  Education Given To: Patient  Education Provided Comments: Role of PT, POC, Need to call for assist.  Education Method: Verbal  Barriers to Learning: Cognition;Hearing  Education Outcome: Continued education needed      Therapy Time   Individual Concurrent Group Co-treatment   Time In 0735         Time Out 0810         Minutes 35                 Teresa Quinones, PT

## 2024-11-10 NOTE — PROGRESS NOTES
Pt transferred to bed 5280. Vitals stable and tele monitor on and verified with [facility]. Pt resting with no c/o pain. Neuro assessment complete, 4 eyes complete. Pt bed alarm on and call light in reach. Care ongoing.    Electronically signed by Vandana Sims RN on 11/10/2024 at 1:35 PM

## 2024-11-10 NOTE — PROGRESS NOTES
4 Eyes Skin Assessment     NAME:  Nyla Kaur  YOB: 1945  MEDICAL RECORD NUMBER:  [id number]    The patient is being assessed for  Transfer to New Unit    I agree that at least one RN has performed a thorough Head to Toe Skin Assessment on the patient. ALL assessment sites listed below have been assessed.      Areas assessed by both nurses:    Head, Face, Ears, Shoulders, Back, Chest, Arms, Elbows, Hands, Sacrum. Buttock, Coccyx, Ischium, Legs. Feet and Heels, and Under Medical Devices         Does the Patient have a Wound? No noted wound(s)       Sorin Prevention initiated by RN: Yes  Wound Care Orders initiated by RN: No    Pressure Injury (Stage 3,4, Unstageable, DTI, NWPT, and Complex wounds) if present, place Wound referral order by RN under : No    New Ostomies, if present place, Ostomy referral order under : No     Nurse 1 eSignature: Electronically signed by Denise Rocha RN on 11/10/24 at 10:00AM EST    **SHARE this note so that the co-signing nurse can place an eSignature**    Nurse 2 eSignature: Electronically signed by Vandana Sims RN on 11/10/24 at 1:32 PM EST

## 2024-11-10 NOTE — SIGNIFICANT EVENT
Code Stroke    Responded to code stroke, patient is a pleasant 79-year-old female, with a past medical history significant for some degree of cognitive impairment and Parkinson's for which she is on Sinemet.  Patient sustained a fall leading to closed head injury and concussion, she was noted to have a scalp hematoma.  Was seen by neurology, initially was in the ICU, for frequent neurochecks.  She also was noted to have hypertensive urgency.  This afternoon, she reported to the nursing staff that she was having numbness on her right side of the body including her face.  Last known normal time was 1605.  There is when a code stroke was called.    Upon arrival, patient was on her recliner, she was transferred to the bed, blood sugar checked was within normal limits, systolic blood pressure was in the 170s.  Rhythm was sinus.  On examination, her speech was intact, she was slightly slow to respond, she could not smile however though she could very well speak, I do not appreciate any facial asymmetry, no facial numbness, pupils are equal and reactive to light, extraocular movements intact.  Her upper and lower extremity strength was intact, no pronator drift, coordination was intact however she had subjective decrease in sensation on the right hand, distal to elbow as well as subjective decrease in sensation on her right leg.    Vitals:    11/10/24 1625   BP:    Pulse: 73   Resp: 20   Temp:    SpO2: 97%     CT head, CT angiogram of the head and neck was performed, CT head was negative, care was discussed with the [facility] stroke team, we agreed to place the patient on low-dose aspirin.  Patient otherwise is being followed by neurology, no additional interventions noted, no TNK or other intervention recommended because the patient's symptoms are very mild, family was updated    Total critical care time including reviewing labs, reviewing imaging, examination, talking with [facility] stroke team and family was  approximately 30 minutes    Addendum:  Received a call from radiology provider stating that CTA of the head and neck does not reveal any large vessel occlusion but there were findings concerning for possible PE, was requested to obtain dedicated imaging of the chest to rule out a PE.  Will do so, since the patient will have repeat contrast exposure, will have her on IV fluids through the night.

## 2024-11-10 NOTE — PLAN OF CARE
Problem: Discharge Planning  Goal: Discharge to home or other facility with appropriate resources  Outcome: Progressing  Flowsheets (Taken 11/10/2024 0800 by Denise Rocha, RN)  Discharge to home or other facility with appropriate resources:   Identify barriers to discharge with patient and caregiver   Arrange for needed discharge resources and transportation as appropriate   Identify discharge learning needs (meds, wound care, etc)   Arrange for interpreters to assist at discharge as needed   Refer to discharge planning if patient needs post-hospital services based on physician order or complex needs related to functional status, cognitive ability or social support system     Problem: Pain  Goal: Verbalizes/displays adequate comfort level or baseline comfort level  Outcome: Progressing  Flowsheets (Taken 11/10/2024 0800 by Denise Rocha, RN)  Verbalizes/displays adequate comfort level or baseline comfort level:   Encourage patient to monitor pain and request assistance   Assess pain using appropriate pain scale   Administer analgesics based on type and severity of pain and evaluate response   Implement non-pharmacological measures as appropriate and evaluate response   Notify Licensed Independent Practitioner if interventions unsuccessful or patient reports new pain   Consider cultural and social influences on pain and pain management     Problem: Safety - Adult  Goal: Free from fall injury  Outcome: Progressing     Problem: Skin/Tissue Integrity  Goal: Absence of new skin breakdown  Description: 1.  Monitor for areas of redness and/or skin breakdown  2.  Assess vascular access sites hourly  3.  Every 4-6 hours minimum:  Change oxygen saturation probe site  4.  Every 4-6 hours:  If on nasal continuous positive airway pressure, respiratory therapy assess nares and determine need for appliance change or resting period.  Outcome: Progressing     Problem: Respiratory - Adult  Goal: Achieves optimal ventilation

## 2024-11-10 NOTE — PROGRESS NOTES
NAME:  Nyla Kaur  YOB: 1945  MEDICAL RECORD NUMBER:  [id number]    Shift Summary: Overall uneventful shift, VSS. Pt on 2L NC while sleeping. Navarro intact. Neuro status unchanged overnight.    Family updated: No    Rhythm: Normal Sinus Rhythm     Most recent vitals:   Visit Vitals  BP (!) 139/59   Pulse 57   Temp 98.6 °F (37 °C) (Oral)   Resp 18   Ht 1.676 m (5' 5.98\")   Wt 63.7 kg (140 lb 6.9 oz)   SpO2 95%   BMI 22.68 kg/m²           No data found.    No data found.      Respiratory support needed (if any):  - O2 - NC - 2 lpm    Admission weight Weight - Scale: 65 kg (143 lb 6.4 oz) (11/08/24 0255)    Today's weight    Wt Readings from Last 1 Encounters:   11/10/24 63.7 kg (140 lb 6.9 oz)        Navarro need assessed each shift: YES -  - continue navarro r/t - Retention   UOP >30ml/hr: YES  Last documented BM (in last 48 hrs):  No data found.             Restraints (in use currently or dc'd in last 12 hrs): No    Order current and documentation up to date? No    Lines/Drains reviewed @ bedside.  Peripheral IV Right Antecubital (Active)   Number of days:        Urinary Catheter 11/08/24 Navarro (Active)   Number of days: 1         Drip rates at handoff:    sodium chloride         Lab Data:   CBC:   Recent Labs     11/08/24 0245 11/09/24 0427   WBC 11.2* 11.5*   HGB 13.9 12.7   HCT 41.2 36.3   MCV 95.7 95.6    219     BMP:    Recent Labs     11/08/24 0245 11/09/24 0427    142   K 3.5 3.3*   CO2 26 23   BUN 18 14   CREATININE 0.7 0.6     LIVR:   Recent Labs     11/08/24 0245 11/09/24 0427   AST 15 11*   ALT <5* <5*     PT/INR: No results for input(s): \"INR\" in the last 72 hours.    Invalid input(s): \"PROT\"  APTT: No results for input(s): \"APTT\" in the last 72 hours.  ABG: No results for input(s): \"PHART\", \"PST4RBJ\", \"PO2ART\" in the last 72 hours.    Any consults during the shift? No    Any signed and held orders to be released?  No        4 Eyes Skin Assessment       The patient is

## 2024-11-10 NOTE — PROGRESS NOTES
V2.0    [facility] [place]      Name:  Nyla Kaur /Age/Sex: 1945  (79 y.o. female)   MRN & CSN:  [id number] & [id number] Encounter Date/Time: 11/10/2024 8:57 AM EDT   Location:  [id number] PCP: Kendra Acevedo DO     Attending:Jd Cox MD       Hospital Day: 3      HPI :         Subjective:     Patient says she feels tired this morning and sleepy, cold in the room,, no acute events reported by night      Review of Systems:      Pertinent positives and negatives discussed in HPI    Objective:     Intake/Output Summary (Last 24 hours) at 11/10/2024 0843  Last data filed at 11/10/2024 0654  Gross per 24 hour   Intake 725.47 ml   Output 1600 ml   Net -874.53 ml      Vitals:   Vitals:    11/10/24 0200 11/10/24 0400 11/10/24 0653 11/10/24 0817   BP:  (!) 139/59     Pulse: 68 57  57   Resp: 22 18  19   Temp:  98.6 °F (37 °C)     TempSrc:  Oral     SpO2: 95% 95%  93%   Weight:   63.7 kg (140 lb 6.9 oz)    Height:             Physical Exam:      Physical Exam Performed:    BP (!) 139/59   Pulse 57   Temp 98.6 °F (37 °C) (Oral)   Resp 19   Ht 1.676 m (5' 5.98\")   Wt 63.7 kg (140 lb 6.9 oz)   SpO2 93%   BMI 22.68 kg/m²     General appearance: No apparent distress, appears stated age and cooperative.  HEENT: Pupils equal, round, and reactive to light. Conjunctivae/corneas clear.  Neck: Supple, with full range of motion. No jugular venous distention. Trachea midline.  Respiratory:  Normal respiratory effort. Clear to auscultation, bilaterally without Rales/Wheezes/Rhonchi.  Cardiovascular: Regular rate and rhythm with normal S1/S2 without murmurs, rubs or gallops.  Abdomen: Soft, non-tender, non-distended with normal bowel sounds.  Musculoskeletal: No clubbing, cyanosis or edema bilaterally.  Full range of motion without deformity.  Neurologic:  Neurovascularly intact without any focal sensory/motor deficits. Cranial nerves: II-XII intact, grossly non-focal.  Psychiatric: Alert and  Decision Making:  The following items were considered in medical decision making:  Discussion of patient care with other providers  Reviewed clinical lab tests  Reviewed radiology tests  Reviewed other diagnostic tests/interventions  Independent review of radiologic images  Microbiology cultures and other micro tests reviewed        Electronically signed by Jd Cox MD on 11/10/2024 at 8:43 AM  Comment: Please note this report has been produced using speech recognition software and may contain errors related to that system including errors in grammar, punctuation, and spelling, as well as words and phrases that may be inappropriate. If there are any questions or concerns, please feel free to contact the dictating provider for clarification.

## 2024-11-10 NOTE — PLAN OF CARE
Problem: Discharge Planning  Goal: Discharge to home or other facility with appropriate resources  11/9/2024 2106 by Farshad Baker RN  Outcome: Progressing  Flowsheets (Taken 11/9/2024 2006)  Discharge to home or other facility with appropriate resources:   Identify barriers to discharge with patient and caregiver   Arrange for needed discharge resources and transportation as appropriate   Identify discharge learning needs (meds, wound care, etc)   Refer to discharge planning if patient needs post-hospital services based on physician order or complex needs related to functional status, cognitive ability or social support system  11/9/2024 0926 by Kenna Blunt RN  Outcome: Progressing  Flowsheets (Taken 11/9/2024 0800)  Discharge to home or other facility with appropriate resources:   Arrange for needed discharge resources and transportation as appropriate   Identify barriers to discharge with patient and caregiver   Identify discharge learning needs (meds, wound care, etc)   Refer to discharge planning if patient needs post-hospital services based on physician order or complex needs related to functional status, cognitive ability or social support system     Problem: Pain  Goal: Verbalizes/displays adequate comfort level or baseline comfort level  11/9/2024 2106 by Farshad Baker RN  Outcome: Progressing  Flowsheets (Taken 11/9/2024 2006)  Verbalizes/displays adequate comfort level or baseline comfort level:   Encourage patient to monitor pain and request assistance   Assess pain using appropriate pain scale   Administer analgesics based on type and severity of pain and evaluate response   Implement non-pharmacological measures as appropriate and evaluate response   Notify Licensed Independent Practitioner if interventions unsuccessful or patient reports new pain  11/9/2024 0926 by Kenna Blunt RN  Outcome: Progressing  Flowsheets (Taken 11/9/2024 0801)  Verbalizes/displays adequate comfort  0800)  Maintains hematologic stability: Assess for signs and symptoms of bleeding or hemorrhage

## 2024-11-10 NOTE — PROGRESS NOTES
Patient transferred to [facility] via patients bed with all her belongings and patient chart. Phone and bedside report given to CIERRA Ross, all questions answered. Patient's valuables handed over to RN 5N. 4 Eyes skin assessment in.

## 2024-11-10 NOTE — PROGRESS NOTES
Pt called RN into room using call light d/t patient losing two rings off her finger. Pt had taken the rings off earlier around 8pm and misplaced them as well. This RN placed rings in container and they are now on her bed side table.

## 2024-11-11 ENCOUNTER — APPOINTMENT (OUTPATIENT)
Dept: MRI IMAGING | Age: 79
End: 2024-11-11
Payer: MEDICARE

## 2024-11-11 LAB
ANION GAP SERPL CALCULATED.3IONS-SCNC: 6 MMOL/L (ref 3–16)
ANTI-XA UNFRAC HEPARIN: 0.35 IU/ML (ref 0.3–0.7)
ANTI-XA UNFRAC HEPARIN: 0.38 IU/ML (ref 0.3–0.7)
ANTI-XA UNFRAC HEPARIN: 0.92 IU/ML (ref 0.3–0.7)
BUN SERPL-MCNC: 11 MG/DL (ref 7–20)
CALCIUM SERPL-MCNC: 8.3 MG/DL (ref 8.3–10.6)
CHLORIDE SERPL-SCNC: 103 MMOL/L (ref 99–110)
CO2 SERPL-SCNC: 23 MMOL/L (ref 21–32)
CREAT SERPL-MCNC: 0.5 MG/DL (ref 0.6–1.2)
EKG ATRIAL RATE: 96 BPM
EKG DIAGNOSIS: NORMAL
EKG P AXIS: 64 DEGREES
EKG P-R INTERVAL: 208 MS
EKG Q-T INTERVAL: 392 MS
EKG QRS DURATION: 94 MS
EKG QTC CALCULATION (BAZETT): 495 MS
EKG R AXIS: -58 DEGREES
EKG T AXIS: 91 DEGREES
EKG VENTRICULAR RATE: 96 BPM
GFR SERPLBLD CREATININE-BSD FMLA CKD-EPI: >90 ML/MIN/{1.73_M2}
GLUCOSE SERPL-MCNC: 81 MG/DL (ref 70–99)
MAGNESIUM SERPL-MCNC: 1.9 MG/DL (ref 1.8–2.4)
POTASSIUM SERPL-SCNC: 3.4 MMOL/L (ref 3.5–5.1)
SODIUM SERPL-SCNC: 132 MMOL/L (ref 136–145)

## 2024-11-11 PROCEDURE — 2700000000 HC OXYGEN THERAPY PER DAY

## 2024-11-11 PROCEDURE — 80048 BASIC METABOLIC PNL TOTAL CA: CPT

## 2024-11-11 PROCEDURE — 2580000003 HC RX 258: Performed by: HOSPITALIST

## 2024-11-11 PROCEDURE — 94761 N-INVAS EAR/PLS OXIMETRY MLT: CPT

## 2024-11-11 PROCEDURE — 93010 ELECTROCARDIOGRAM REPORT: CPT | Performed by: INTERNAL MEDICINE

## 2024-11-11 PROCEDURE — 6360000002 HC RX W HCPCS: Performed by: HOSPITALIST

## 2024-11-11 PROCEDURE — 6370000000 HC RX 637 (ALT 250 FOR IP): Performed by: HOSPITALIST

## 2024-11-11 PROCEDURE — 85520 HEPARIN ASSAY: CPT

## 2024-11-11 PROCEDURE — 83735 ASSAY OF MAGNESIUM: CPT

## 2024-11-11 PROCEDURE — 2060000000 HC ICU INTERMEDIATE R&B

## 2024-11-11 PROCEDURE — 70551 MRI BRAIN STEM W/O DYE: CPT

## 2024-11-11 PROCEDURE — 93005 ELECTROCARDIOGRAM TRACING: CPT | Performed by: HOSPITALIST

## 2024-11-11 PROCEDURE — 36415 COLL VENOUS BLD VENIPUNCTURE: CPT

## 2024-11-11 PROCEDURE — 6360000002 HC RX W HCPCS: Performed by: NURSE PRACTITIONER

## 2024-11-11 PROCEDURE — 9990000010 HC NO CHARGE VISIT

## 2024-11-11 RX ADMIN — LABETALOL HYDROCHLORIDE 5 MG: 5 INJECTION INTRAVENOUS at 00:03

## 2024-11-11 RX ADMIN — Medication 1000 UNITS: at 08:49

## 2024-11-11 RX ADMIN — ONDANSETRON 4 MG: 2 INJECTION INTRAMUSCULAR; INTRAVENOUS at 17:21

## 2024-11-11 RX ADMIN — SODIUM CHLORIDE, PRESERVATIVE FREE 10 ML: 5 INJECTION INTRAVENOUS at 08:50

## 2024-11-11 RX ADMIN — HEPARIN SODIUM 1000 UNITS/HR: 10000 INJECTION, SOLUTION INTRAVENOUS at 18:57

## 2024-11-11 RX ADMIN — AMLODIPINE BESYLATE 10 MG: 10 TABLET ORAL at 08:49

## 2024-11-11 RX ADMIN — ASPIRIN 81 MG: 81 TABLET, CHEWABLE ORAL at 08:50

## 2024-11-11 RX ADMIN — CARBIDOPA AND LEVODOPA 3 TABLET: 25; 100 TABLET ORAL at 12:00

## 2024-11-11 RX ADMIN — ONDANSETRON 4 MG: 2 INJECTION INTRAMUSCULAR; INTRAVENOUS at 05:12

## 2024-11-11 RX ADMIN — POTASSIUM BICARBONATE 40 MEQ: 391 TABLET, EFFERVESCENT ORAL at 20:08

## 2024-11-11 RX ADMIN — PROMETHAZINE HYDROCHLORIDE 12.5 MG: 25 INJECTION INTRAMUSCULAR; INTRAVENOUS at 21:28

## 2024-11-11 RX ADMIN — RIVASTIGMINE TARTRATE 6 MG: 1.5 CAPSULE ORAL at 20:10

## 2024-11-11 RX ADMIN — CARBIDOPA AND LEVODOPA 3 TABLET: 25; 100 TABLET ORAL at 17:21

## 2024-11-11 RX ADMIN — SODIUM CHLORIDE: 9 INJECTION, SOLUTION INTRAVENOUS at 04:32

## 2024-11-11 RX ADMIN — CARBIDOPA AND LEVODOPA 3 TABLET: 25; 100 TABLET ORAL at 20:10

## 2024-11-11 RX ADMIN — CARBIDOPA AND LEVODOPA 3 TABLET: 25; 100 TABLET ORAL at 08:49

## 2024-11-11 RX ADMIN — SERTRALINE 100 MG: 100 TABLET, FILM COATED ORAL at 20:10

## 2024-11-11 RX ADMIN — HYDRALAZINE HYDROCHLORIDE 10 MG: 20 INJECTION INTRAMUSCULAR; INTRAVENOUS at 04:28

## 2024-11-11 RX ADMIN — ATORVASTATIN CALCIUM 20 MG: 20 TABLET, FILM COATED ORAL at 20:10

## 2024-11-11 RX ADMIN — SODIUM CHLORIDE, PRESERVATIVE FREE 10 ML: 5 INJECTION INTRAVENOUS at 20:12

## 2024-11-11 RX ADMIN — RIVASTIGMINE TARTRATE 6 MG: 1.5 CAPSULE ORAL at 08:53

## 2024-11-11 RX ADMIN — PROMETHAZINE HYDROCHLORIDE 12.5 MG: 25 INJECTION INTRAMUSCULAR; INTRAVENOUS at 13:58

## 2024-11-11 ASSESSMENT — PAIN SCALES - GENERAL
PAINLEVEL_OUTOF10: 0

## 2024-11-11 ASSESSMENT — PAIN SCALES - WONG BAKER: WONGBAKER_NUMERICALRESPONSE: NO HURT

## 2024-11-11 NOTE — PROGRESS NOTES
Clinical Pharmacy Note  Heparin Dosing       Lab Results   Component Value Date/Time    ANTIXAUHEP 0.92 11/11/2024 05:11 AM      Lab Results   Component Value Date/Time    HGB 12.7 11/09/2024 04:27 AM    HCT 36.3 11/09/2024 04:27 AM     11/09/2024 04:27 AM    INR 1.00 10/23/2024 01:35 AM       Current Infusion Rate: 1150 units/hr    Plan:  Bolus: 0 units  Rate: 1000 units/hr  Next anti-Xa level: 1200 11/11/24    Pharmacy will continue to monitor and adjust based on anti-Xa results.    Ami Tierney, PharmD

## 2024-11-11 NOTE — PROGRESS NOTES
V2.0    [facility] [place]      Name:  Nyla Kaur /Age/Sex: 1945  (79 y.o. female)   MRN & CSN:  [id number] & [id number] Encounter Date/Time: 2024 8:57 AM EDT   Location:  [id number] PCP: Kendra Acevedo DO     Attending:Jd Cox MD       Hospital Day: 4      HPI :         Subjective:     Patient says she feels tired this morning and sleepy, cold in the room,, no acute events reported by night      Review of Systems:      Pertinent positives and negatives discussed in HPI    Objective:     Intake/Output Summary (Last 24 hours) at 2024 1318  Last data filed at 2024 1235  Gross per 24 hour   Intake 186.2 ml   Output 1800 ml   Net -1613.8 ml      Vitals:   Vitals:    24 0410 24 0419 24 0738 24 1100   BP: (!) 182/70  (!) 154/67 (!) 155/66   Pulse: 61 65 83 77   Resp: 19 19 20 18   Temp: 98.3 °F (36.8 °C)  97.4 °F (36.3 °C) 97.6 °F (36.4 °C)   TempSrc: Oral  Axillary Axillary   SpO2: 96%  93% 94%   Weight:  64.3 kg (141 lb 12.1 oz)     Height:             Physical Exam:      Physical Exam Performed:    BP (!) 155/66   Pulse 77   Temp 97.6 °F (36.4 °C) (Axillary)   Resp 18   Ht 1.676 m (5' 5.98\")   Wt 64.3 kg (141 lb 12.1 oz)   SpO2 94%   BMI 22.89 kg/m²     General appearance: No apparent distress, appears stated age and cooperative.  Respiratory:  Normal respiratory effort. Clear to auscultation, bilaterally without Rales/Wheezes/Rhonchi.  Cardiovascular: Regular rate and rhythm with normal S1/S2 without murmurs, rubs or gallops.  Abdomen: Soft, non-tender, non-distended with normal bowel sounds.  Neurologic:  Neurovascularly intact without any focal sensory/motor deficits. Cranial nerves: II-XII intact, grossly non-focal.        Medications:   Medications:    rivastigmine  6 mg Oral BID    aspirin  81 mg Oral Daily    Vitamin D  1,000 Units Oral Daily    carbidopa-levodopa  3 tablet Oral 4x Daily    levothyroxine  25 mcg Oral Daily

## 2024-11-11 NOTE — PROGRESS NOTES
Clinical Pharmacy Note  Heparin Dosing       Lab Results   Component Value Date/Time    ANTIXAUHEP 0.35 11/11/2024 11:37 AM      Lab Results   Component Value Date/Time    HGB 12.7 11/09/2024 04:27 AM    HCT 36.3 11/09/2024 04:27 AM     11/09/2024 04:27 AM    INR 1.00 10/23/2024 01:35 AM       Current Infusion Rate: 1000 units/hr    Plan:  Rate: continue 1000 units/hr  Next anti-Xa level: 1730 11/11/24    Pharmacy will continue to monitor and adjust based on anti-Xa results.  Debby Edwards [facility],11/11/2024,12:54 PM

## 2024-11-11 NOTE — PROGRESS NOTES
Physical Therapy  Attempt Note    Name:Nyla Kaur  :1945  MRN:[id number]  Room: Y7B-2332/5280-01    Date of Service: 2024     Patient chart reviewed. PT attempted to see for PT tx at this time. Pt sleeping in bed, holding emesis bucket. Aroused but only brief period of maintaining arousal, states \"too sick, maybe later.\" Will attempt again as therapy schedule permits.            If patient is discharged prior to the next physical therapy visit; Please see last written PT note for discharge status.     Electronically signed by Aixa Yun, PT on 2024 at 8:34 AM  Aixa Yun PT, DPT, CNS [phone] 24 8:35 AM

## 2024-11-11 NOTE — NURSE NAVIGATOR
EKG done vomiting  several small = yellow  fluid emesis  Zofran was given ,  unable to take am med's

## 2024-11-11 NOTE — CARE COORDINATION
DISCHARGE PLANNING:    DC plan to return home with .  Family will transport.  Resume services with [facility].  Watching neuro recs, Mri complete.    #[phone]  Electronically signed by Shahnaz Mullins RN on 11/11/2024 at 3:46 PM

## 2024-11-11 NOTE — PROGRESS NOTES
Occupational Therapy  Unable to see pt at this time due to pt declining OT evaluation due to nausea and not feeling well. Will follow up with pt as time allows.    Kurt Livingston, OTR/L

## 2024-11-11 NOTE — PROGRESS NOTES
Comprehensive Nutrition Assessment    Type and Reason for Visit:  Initial, Positive nutrition screen    Nutrition Recommendations/Plan:   Continue Dysphagia Soft and Bite Sized Texture  Modify supplement to Ensure Clear  Monitor wt trend     Malnutrition Assessment:  Malnutrition Status:  No malnutrition (11/11/24 5585)    Context:  Acute Illness     Findings of the 6 clinical characteristics of malnutrition:  Energy Intake:  Unable to assess  Weight Loss:  No weight loss     Body Fat Loss:  No body fat loss     Muscle Mass Loss:  No muscle mass loss    Fluid Accumulation:  No fluid accumulation     Strength:  Not Performed    Nutrition Assessment:    MST=2 for wt loss and decreased po intake. PMH includes; Parkinsons, Cognitive Impairment, GERD, HLD, HTN, DM (with previous ed), Gastric Band Surgery 2011. Pt adm after a fall with N/V at home. Found to have Concussion Syndrome and PE. Diet adv to Dysphagia, Soft and Bite Sized Texture. Pt would not open eyes or answer this writer's questions at time of encounter. Information taken from EMR and Nursing. Nursing reported that po intake down currently r/t nausea and vomiting. Current intake at ~ 25-50%. Nursing not certain if Ensure is accepted. Will change ONS to Ensure Clear, a lighter supplement, that may be better tolerated. Wt records reviewed with UBW range noted at 140-low 150s. over the past 2 years. Current wt this date at 141 lbs 12.1 oz. Will monitor trend for significant change. Will continue to monitor progress.    Nutrition Related Findings:    Labs reviewed. Noted no edema. Wound Type: None       Current Nutrition Intake & Therapies:    Average Meal Intake: 26-50%  Average Supplements Intake: Unable to assess  ADULT DIET; Dysphagia - Soft and Bite Sized  ADULT ORAL NUTRITION SUPPLEMENT; Breakfast, Lunch, Dinner; Standard High Calorie/High Protein Oral Supplement    Anthropometric Measures:  Height: 167.6 cm (5' 5.98\")  Ideal Body Weight (IBW): 130 lbs

## 2024-11-11 NOTE — PROGRESS NOTES
Received notification call from Dr. Cuenca from [facility]: spoke with Dr. Cuenca-> Cta chest indicates multiple segmental PE;s on the left w/o RH strain.  I also confirmed w/ him that the Ct head and Cta H & N showed no evidence of any ICH.    Starting pt on Hep gtt protocol per Dr. Emily Romero instructions if results for PE were positive.   Lovenox d/c'd.

## 2024-11-11 NOTE — PROGRESS NOTES
Clinical Pharmacy Note  Heparin Dosing Consult    Nyla Kaur is a 79 y.o. female ordered heparin per VTE/DVT/PE Nomogram by Kunal.     Lab Results   Component Value Date/Time    ANTIXAUHEP <0.10 11/10/2024 07:56 PM      Lab Results   Component Value Date/Time    HGB 12.7 11/09/2024 04:27 AM    HCT 36.3 11/09/2024 04:27 AM     11/09/2024 04:27 AM    INR 1.00 10/23/2024 01:35 AM       Ht Readings from Last 1 Encounters:   11/09/24 1.676 m (5' 5.98\")        Wt Readings from Last 1 Encounters:   11/10/24 63.7 kg (140 lb 6.9 oz)        Assessment/Plan:  Initial bolus: 5000 units  Initial infusion rate: 1150 units/hr  Next anti-Xa: 11/11/24 0400     Pharmacy will continue to monitor adjust heparin based on anti-Xa results using nomogram below:     VTE/DVT/PE Heparin Nomogram     Initial Bolus: 80 units/kg Max Bolus: 10,000 units       Initial Rate: 18 units/kg/hr Max Initial Rate: 2,100 units/hr     anti-Xa Bolus Titration   < 0.1 Heparin 80 units/kg bolus Increase drip by 4 units/kg/hr   0.1 - 0.29 Heparin 40 units/kg bolus Increase drip by 2 units/kg/hr   0.3 - 0.7 No Bolus No Change   0.71 - 0.8 No Bolus Decrease drip by 1 units/kg/hr   0.81 - 0.99 No Bolus Decrease drip by 2 units/kg/hr   > 1 Hold Heparin for 1 hour Decrease drip by 3 units/kg/hr       Obtain anti-Xa 6 hours after initial bolus and 6 hours after any dose change until two consecutive therapeutic anti-Xa levels are achieved - then daily.

## 2024-11-11 NOTE — PROGRESS NOTES
independently reviewed  IMPRESSION:  Cerebral atrophy.  Severe chronic small vessel ischemic changes.  No acute  brain parenchymal abnormality.    CTA head/neck 11/10/24, independently reviewed  IMPRESSION:  Question of acute pulmonary emboli in a segmental pulmonary artery of the  left upper lobe.  Further evaluation with CTA pulmonary artery is recommended.  No acute abnormality or flow-limiting stenosis of the major arteries of the  head and neck.    Pulmonary CTA 11/10/24  IMPRESSION:  Multiple segmental/subsegmental pulmonary emboli within the left pulmonary  arterial system.  No definite right heart strain.    TTE 10/23/24    Left Ventricle: Normal left ventricular systolic function with a visually estimated EF of 55 - 60%. Left ventricle size is normal. Mild septal thickening. Normal wall motion. Normal diastolic function.    Aortic Valve: Trileaflet valve. Sclerosis of the aortic valve cusps. Mild annular calcification. Mild regurgitation with a centrally directed jet.    Interatrial Septum: No interatrial shunt visualized with color Doppler. Agitated saline study was negative with and without provocation.    Image quality is adequate. Procedure performed with the patient in a supine position.    Impression/Recommendations  Suspected post concussive syndrome.   Pulmonary embolism.   ?R sided numbness.    Parkinson's disease.   Cognitive impairment.     She is stable from neurologic perspective.   There are no acute findings on her brain MRI.    No objection to anticoagulation.    Asa in addition at the discretion of other providers.    Supportive care.   Continue PD medications.    Follow up w/ neurologist at .     We will sign off.  Call w/ questions.  Thank you.     Cruly Abad NP  [facility]    A copy of this note was provided for Dr Cox, Jd Carlton MD

## 2024-11-12 ENCOUNTER — HOSPITAL ENCOUNTER (INPATIENT)
Dept: VASCULAR LAB | Age: 79
Discharge: HOME | End: 2024-11-14
Attending: HOSPITALIST
Payer: MEDICARE

## 2024-11-12 LAB
ALBUMIN SERPL-MCNC: 3.8 G/DL (ref 3.4–5)
ALBUMIN SERPL-MCNC: 3.8 G/DL (ref 3.4–5)
ALBUMIN/GLOB SERPL: 1.7 {RATIO} (ref 1.1–2.2)
ALBUMIN/GLOB SERPL: 1.8 {RATIO} (ref 1.1–2.2)
ALP SERPL-CCNC: 73 U/L (ref 40–129)
ALP SERPL-CCNC: 74 U/L (ref 40–129)
ALT SERPL-CCNC: <5 U/L (ref 10–40)
ALT SERPL-CCNC: <5 U/L (ref 10–40)
AMMONIA PLAS-SCNC: 33 UMOL/L (ref 11–51)
AMORPHOUS: PRESENT
ANION GAP SERPL CALCULATED.3IONS-SCNC: 11 MMOL/L (ref 3–16)
ANION GAP SERPL CALCULATED.3IONS-SCNC: 9 MMOL/L (ref 3–16)
ANTI-XA UNFRAC HEPARIN: 0.35 IU/ML (ref 0.3–0.7)
AST SERPL-CCNC: 16 U/L (ref 15–37)
AST SERPL-CCNC: 16 U/L (ref 15–37)
BACTERIA URNS QL MICRO: ABNORMAL /HPF
BASE EXCESS BLDV CALC-SCNC: -2.1 MMOL/L
BILIRUB SERPL-MCNC: 0.4 MG/DL (ref 0–1)
BILIRUB SERPL-MCNC: 0.4 MG/DL (ref 0–1)
BILIRUB UR QL STRIP.AUTO: NEGATIVE
BUN SERPL-MCNC: 10 MG/DL (ref 7–20)
BUN SERPL-MCNC: 13 MG/DL (ref 7–20)
CALCIUM SERPL-MCNC: 8.3 MG/DL (ref 8.3–10.6)
CALCIUM SERPL-MCNC: 8.4 MG/DL (ref 8.3–10.6)
CHLORIDE SERPL-SCNC: 104 MMOL/L (ref 99–110)
CHLORIDE SERPL-SCNC: 108 MMOL/L (ref 99–110)
CLARITY UR: CLEAR
CO2 BLDV-SCNC: 24 MMOL/L
CO2 SERPL-SCNC: 21 MMOL/L (ref 21–32)
CO2 SERPL-SCNC: 22 MMOL/L (ref 21–32)
COHGB MFR BLDV: 1.4 %
COLOR UR: YELLOW
CREAT SERPL-MCNC: 0.5 MG/DL (ref 0.6–1.2)
CREAT SERPL-MCNC: 0.5 MG/DL (ref 0.6–1.2)
DEPRECATED RDW RBC AUTO: 12.6 % (ref 12.4–15.4)
DEPRECATED RDW RBC AUTO: 12.7 % (ref 12.4–15.4)
EPI CELLS #/AREA URNS AUTO: 1 /HPF (ref 0–5)
GFR SERPLBLD CREATININE-BSD FMLA CKD-EPI: >90 ML/MIN/{1.73_M2}
GFR SERPLBLD CREATININE-BSD FMLA CKD-EPI: >90 ML/MIN/{1.73_M2}
GLUCOSE BLD-MCNC: 78 MG/DL (ref 70–99)
GLUCOSE BLD-MCNC: 80 MG/DL (ref 70–99)
GLUCOSE SERPL-MCNC: 105 MG/DL (ref 70–99)
GLUCOSE SERPL-MCNC: 86 MG/DL (ref 70–99)
GLUCOSE UR STRIP.AUTO-MCNC: NEGATIVE MG/DL
HCO3 BLDV-SCNC: 23 MMOL/L (ref 23–29)
HCT VFR BLD AUTO: 36.9 % (ref 36–48)
HCT VFR BLD AUTO: 39.1 % (ref 36–48)
HGB BLD-MCNC: 12.6 G/DL (ref 12–16)
HGB BLD-MCNC: 13.4 G/DL (ref 12–16)
HGB UR QL STRIP.AUTO: ABNORMAL
HYALINE CASTS #/AREA URNS AUTO: 8 /LPF (ref 0–8)
KETONES UR STRIP.AUTO-MCNC: 80 MG/DL
LEUKOCYTE ESTERASE UR QL STRIP.AUTO: ABNORMAL
MAGNESIUM SERPL-MCNC: 1.91 MG/DL (ref 1.8–2.4)
MAGNESIUM SERPL-MCNC: 1.98 MG/DL (ref 1.8–2.4)
MCH RBC QN AUTO: 33 PG (ref 26–34)
MCH RBC QN AUTO: 33.1 PG (ref 26–34)
MCHC RBC AUTO-ENTMCNC: 34.2 G/DL (ref 31–36)
MCHC RBC AUTO-ENTMCNC: 34.3 G/DL (ref 31–36)
MCV RBC AUTO: 96.2 FL (ref 80–100)
MCV RBC AUTO: 96.5 FL (ref 80–100)
METHGB MFR BLDV: 0.4 %
NITRITE UR QL STRIP.AUTO: NEGATIVE
NT-PROBNP SERPL-MCNC: 168 PG/ML (ref 0–449)
O2 THERAPY: ABNORMAL
PCO2 BLDV: 37 MMHG (ref 40–50)
PERFORMED ON: NORMAL
PERFORMED ON: NORMAL
PH BLDV: 7.39 [PH] (ref 7.35–7.45)
PH UR STRIP.AUTO: 7 [PH] (ref 5–8)
PHOSPHATE SERPL-MCNC: 1.3 MG/DL (ref 2.5–4.9)
PHOSPHATE SERPL-MCNC: 1.5 MG/DL (ref 2.5–4.9)
PLATELET # BLD AUTO: 202 K/UL (ref 135–450)
PLATELET # BLD AUTO: 208 K/UL (ref 135–450)
PMV BLD AUTO: 9 FL (ref 5–10.5)
PMV BLD AUTO: 9 FL (ref 5–10.5)
PO2 BLDV: 63 MMHG
POTASSIUM SERPL-SCNC: 3.5 MMOL/L (ref 3.5–5.1)
POTASSIUM SERPL-SCNC: 3.7 MMOL/L (ref 3.5–5.1)
POTASSIUM SERPL-SCNC: 3.8 MMOL/L (ref 3.5–5.1)
PROT SERPL-MCNC: 5.9 G/DL (ref 6.4–8.2)
PROT SERPL-MCNC: 6 G/DL (ref 6.4–8.2)
PROT UR STRIP.AUTO-MCNC: ABNORMAL MG/DL
RBC # BLD AUTO: 3.82 M/UL (ref 4–5.2)
RBC # BLD AUTO: 4.07 M/UL (ref 4–5.2)
RBC CLUMPS #/AREA URNS AUTO: 29 /HPF (ref 0–4)
SAO2 % BLDV: 94 %
SODIUM SERPL-SCNC: 134 MMOL/L (ref 136–145)
SODIUM SERPL-SCNC: 141 MMOL/L (ref 136–145)
SP GR UR STRIP.AUTO: 1.02 (ref 1–1.03)
UA COMPLETE W REFLEX CULTURE PNL UR: YES
UA DIPSTICK W REFLEX MICRO PNL UR: YES
URN SPEC COLLECT METH UR: ABNORMAL
UROBILINOGEN UR STRIP-ACNC: 1 E.U./DL
WBC # BLD AUTO: 10.6 K/UL (ref 4–11)
WBC # BLD AUTO: 9.6 K/UL (ref 4–11)
WBC #/AREA URNS AUTO: 176 /HPF (ref 0–5)

## 2024-11-12 PROCEDURE — 87086 URINE CULTURE/COLONY COUNT: CPT

## 2024-11-12 PROCEDURE — 6360000002 HC RX W HCPCS: Performed by: HOSPITALIST

## 2024-11-12 PROCEDURE — 82140 ASSAY OF AMMONIA: CPT

## 2024-11-12 PROCEDURE — 97166 OT EVAL MOD COMPLEX 45 MIN: CPT

## 2024-11-12 PROCEDURE — 6360000002 HC RX W HCPCS: Performed by: REGISTERED NURSE

## 2024-11-12 PROCEDURE — 2060000000 HC ICU INTERMEDIATE R&B

## 2024-11-12 PROCEDURE — 87077 CULTURE AEROBIC IDENTIFY: CPT

## 2024-11-12 PROCEDURE — 2580000003 HC RX 258: Performed by: REGISTERED NURSE

## 2024-11-12 PROCEDURE — 87186 SC STD MICRODIL/AGAR DIL: CPT

## 2024-11-12 PROCEDURE — 6370000000 HC RX 637 (ALT 250 FOR IP): Performed by: HOSPITALIST

## 2024-11-12 PROCEDURE — 83880 ASSAY OF NATRIURETIC PEPTIDE: CPT

## 2024-11-12 PROCEDURE — 82803 BLOOD GASES ANY COMBINATION: CPT

## 2024-11-12 PROCEDURE — 97530 THERAPEUTIC ACTIVITIES: CPT

## 2024-11-12 PROCEDURE — 83735 ASSAY OF MAGNESIUM: CPT

## 2024-11-12 PROCEDURE — 85520 HEPARIN ASSAY: CPT

## 2024-11-12 PROCEDURE — 84100 ASSAY OF PHOSPHORUS: CPT

## 2024-11-12 PROCEDURE — 97116 GAIT TRAINING THERAPY: CPT

## 2024-11-12 PROCEDURE — 97535 SELF CARE MNGMENT TRAINING: CPT

## 2024-11-12 PROCEDURE — 80053 COMPREHEN METABOLIC PANEL: CPT

## 2024-11-12 PROCEDURE — 81001 URINALYSIS AUTO W/SCOPE: CPT

## 2024-11-12 PROCEDURE — 93970 EXTREMITY STUDY: CPT

## 2024-11-12 PROCEDURE — 84132 ASSAY OF SERUM POTASSIUM: CPT

## 2024-11-12 PROCEDURE — 85027 COMPLETE CBC AUTOMATED: CPT

## 2024-11-12 PROCEDURE — 36415 COLL VENOUS BLD VENIPUNCTURE: CPT

## 2024-11-12 PROCEDURE — 2580000003 HC RX 258: Performed by: HOSPITALIST

## 2024-11-12 RX ORDER — SCOLOPAMINE TRANSDERMAL SYSTEM 1 MG/1
1 PATCH, EXTENDED RELEASE TRANSDERMAL
Status: DISCONTINUED | OUTPATIENT
Start: 2024-11-12 | End: 2024-11-15 | Stop reason: HOSPADM

## 2024-11-12 RX ORDER — SODIUM CHLORIDE 9 MG/ML
INJECTION, SOLUTION INTRAVENOUS CONTINUOUS
Status: ACTIVE | OUTPATIENT
Start: 2024-11-13 | End: 2024-11-13

## 2024-11-12 RX ORDER — 0.9 % SODIUM CHLORIDE 0.9 %
500 INTRAVENOUS SOLUTION INTRAVENOUS ONCE
Status: COMPLETED | OUTPATIENT
Start: 2024-11-12 | End: 2024-11-13

## 2024-11-12 RX ADMIN — SODIUM CHLORIDE, PRESERVATIVE FREE 10 ML: 5 INJECTION INTRAVENOUS at 10:30

## 2024-11-12 RX ADMIN — AMLODIPINE BESYLATE 10 MG: 10 TABLET ORAL at 10:30

## 2024-11-12 RX ADMIN — APIXABAN 10 MG: 5 TABLET, FILM COATED ORAL at 10:30

## 2024-11-12 RX ADMIN — ONDANSETRON 4 MG: 2 INJECTION INTRAMUSCULAR; INTRAVENOUS at 02:19

## 2024-11-12 RX ADMIN — SODIUM CHLORIDE 500 ML: 9 INJECTION, SOLUTION INTRAVENOUS at 23:57

## 2024-11-12 RX ADMIN — HYDRALAZINE HYDROCHLORIDE 10 MG: 20 INJECTION INTRAMUSCULAR; INTRAVENOUS at 04:54

## 2024-11-12 RX ADMIN — POTASSIUM CHLORIDE 10 MEQ: 7.46 INJECTION, SOLUTION INTRAVENOUS at 02:23

## 2024-11-12 RX ADMIN — PROMETHAZINE HYDROCHLORIDE 12.5 MG: 25 INJECTION INTRAMUSCULAR; INTRAVENOUS at 06:15

## 2024-11-12 RX ADMIN — LEVOTHYROXINE SODIUM 25 MCG: 0.03 TABLET ORAL at 10:30

## 2024-11-12 RX ADMIN — POTASSIUM CHLORIDE 10 MEQ: 7.46 INJECTION, SOLUTION INTRAVENOUS at 03:35

## 2024-11-12 RX ADMIN — CEFTRIAXONE 1000 MG: 1 INJECTION, POWDER, FOR SOLUTION INTRAMUSCULAR; INTRAVENOUS at 04:56

## 2024-11-12 RX ADMIN — LABETALOL HYDROCHLORIDE 5 MG: 5 INJECTION INTRAVENOUS at 23:56

## 2024-11-12 RX ADMIN — Medication 1000 UNITS: at 10:30

## 2024-11-12 RX ADMIN — CARBIDOPA AND LEVODOPA 3 TABLET: 25; 100 TABLET ORAL at 10:30

## 2024-11-12 RX ADMIN — SODIUM CHLORIDE, PRESERVATIVE FREE 10 ML: 5 INJECTION INTRAVENOUS at 22:04

## 2024-11-12 RX ADMIN — SODIUM CHLORIDE: 9 INJECTION, SOLUTION INTRAVENOUS at 02:22

## 2024-11-12 RX ADMIN — RIVASTIGMINE TARTRATE 6 MG: 1.5 CAPSULE ORAL at 10:30

## 2024-11-12 RX ADMIN — PROMETHAZINE HYDROCHLORIDE 12.5 MG: 25 INJECTION INTRAMUSCULAR; INTRAVENOUS at 19:01

## 2024-11-12 RX ADMIN — PANTOPRAZOLE SODIUM 40 MG: 40 TABLET, DELAYED RELEASE ORAL at 10:30

## 2024-11-12 RX ADMIN — ASPIRIN 81 MG: 81 TABLET, CHEWABLE ORAL at 10:30

## 2024-11-12 ASSESSMENT — PAIN SCALES - GENERAL: PAINLEVEL_OUTOF10: 0

## 2024-11-12 NOTE — FLOWSHEET NOTE
11/12/24 0454   Vital Signs   BP (!) 195/70     Prn hydralazine given. Care ongoing.     Electronically signed by JEANNE RASHID RN on 11/12/24 at 5:01 AM EST

## 2024-11-12 NOTE — PROGRESS NOTES
V2.0    [facility]      Name:  Nyla Kaur /Age/Sex: 1945  (79 y.o. female)   MRN & CSN:  [id number] & [id number] Encounter Date/Time: 2024 8:57 AM EDT   Location:  [id number]-7261/5280-01 PCP: Kendra Acevedo DO     Attending:Jd Cox MD       Hospital Day: 5      HPI :         Subjective:     Patient reports persistent nausea and has been vomiting, she just woke up this morning but RN reports she has been very sleepy      Review of Systems:      Pertinent positives and negatives discussed in HPI    Objective:     Intake/Output Summary (Last 24 hours) at 2024 1259  Last data filed at 2024 0258  Gross per 24 hour   Intake 480 ml   Output 625 ml   Net -145 ml      Vitals:   Vitals:    24 0358 24 0454 24 0540 24 1020   BP: (!) 160/68 (!) 195/70     Pulse: 67   84   Resp: 19   22   Temp: 98.7 °F (37.1 °C)      TempSrc: Oral      SpO2: 96%   94%   Weight:   64.8 kg (142 lb 13.7 oz)    Height:             Physical Exam:      Physical Exam Performed:    BP (!) 195/70   Pulse 84   Temp 98.7 °F (37.1 °C) (Oral)   Resp 22   Ht 1.676 m (5' 5.98\")   Wt 64.8 kg (142 lb 13.7 oz)   SpO2 94%   BMI 23.07 kg/m²     General appearance: No apparent distress, appears stated age and cooperative.  Respiratory:  Normal respiratory effort. Clear to auscultation, bilaterally without Rales/Wheezes/Rhonchi.  Cardiovascular: Regular rate and rhythm with normal S1/S2 without murmurs, rubs or gallops.  Abdomen: Soft, non-tender, non-distended with normal bowel sounds.  Neurologic:  Neurovascularly intact without any focal sensory/motor deficits. Cranial nerves: II-XII intact, grossly non-focal.        Medications:   Medications:    cefTRIAXone (ROCEPHIN) IV  1,000 mg IntraVENous Q24H    apixaban  10 mg Oral BID    Followed by    [START ON 2024] apixaban  5 mg Oral BID    scopolamine  1 patch TransDERmal Q72H    rivastigmine  6 mg Oral BID    aspirin  81 mg Oral  improved  Continue  amlodipine    Urinary tract infection  Rocephin started 11/12--follow-up with urine culture    -Parkinson's disease-continue Sinemet  -Cognitive impairment--resume rivastigmine  -Hypothyroidism-continue Synthroid    -Persistent nausea with vomiting  No associated abdominal pain and constipation  Recent KUB normal  Continue antiemetics      Diet ADULT DIET; Dysphagia - Soft and Bite Sized  ADULT ORAL NUTRITION SUPPLEMENT; Breakfast, Lunch, Dinner; Clear Liquid Oral Supplement     DVT Prophylaxis [x] Lovenox, []  Heparin, [] SCDs, [] Ambulation,  [] Eliquis, [] Xarelto  [] Coumadin   Code Status Full Code   Disposition From: Home  Expected Disposition: Home health versus ECF  Estimated Date of Discharge: 24 hours  Patient requires continued admission due to nausea and vomiting   Surrogate Decision Maker/ POA       Personally reviewed Lab Studies and Imaging        Medical Decision Making:  The following items were considered in medical decision making:  Discussion of patient care with other providers  Reviewed clinical lab tests  Reviewed radiology tests  Reviewed other diagnostic tests/interventions  Independent review of radiologic images  Microbiology cultures and other micro tests reviewed        Electronically signed by Jd Cox MD on 11/12/2024 at 12:59 PM  Comment: Please note this report has been produced using speech recognition software and may contain errors related to that system including errors in grammar, punctuation, and spelling, as well as words and phrases that may be inappropriate. If there are any questions or concerns, please feel free to contact the dictating provider for clarification.

## 2024-11-12 NOTE — PROGRESS NOTES
Clinical Pharmacy Note  Heparin Dosing       Lab Results   Component Value Date/Time    ANTIXAUHEP 0.38 11/11/2024 06:01 PM      Lab Results   Component Value Date/Time    HGB 12.7 11/09/2024 04:27 AM    HCT 36.3 11/09/2024 04:27 AM     11/09/2024 04:27 AM    INR 1.00 10/23/2024 01:35 AM       Current Infusion Rate: 1000 units/hr    Plan:  Rate: continue 1000 units/hr  Next anti-Xa level: 0600 11/12/24    Pharmacy will continue to monitor and adjust based on anti-Xa results.   Oumou Goldstein RPH, PharmD 11/11/2024 8:39 PM

## 2024-11-12 NOTE — PROGRESS NOTES
mobility  Supine to Sit: Unable to assess  Sit to Supine: Unable to assess  Scooting: Unable to assess  Bed Mobility Comments: OOB in recliner throughout session  Transfers  Sit to Stand: Minimal Assistance;Contact guard assistance  Stand to Sit: Contact guard assistance  Ambulation  Surface: Level tile  Device: Rolling Walker  Assistance: Contact guard assistance  Gait Deviations: Slow Deepti  Distance: 30' X 2 trials  Comments: increased effort and time to perform     Balance  Posture: Fair  Sitting - Static: Good  Sitting - Dynamic: Good  Standing - Static: Fair  Standing - Dynamic: Fair  Comments: Sitting recliner unsupported and commode SBA, standing CGA  for toilet tasks this date            AM-PAC - Mobility    AM-PAC Basic Mobility - Inpatient   How much help is needed turning from your back to your side while in a flat bed without using bedrails?: A Little  How much help is needed moving from lying on your back to sitting on the side of a flat bed without using bedrails?: A Little  How much help is needed moving to and from a bed to a chair?: A Little  How much help is needed standing up from a chair using your arms?: A Little  How much help is needed walking in hospital room?: A Little  How much help is needed climbing 3-5 steps with a railing?: A Lot  AM-PAC Inpatient Mobility Raw Score : 17  AM-PAC Inpatient T-Scale Score : 42.13  Mobility Inpatient CMS 0-100% Score: 50.57  Mobility Inpatient CMS G-Code Modifier : CK           Goals  Short Term Goals  Time Frame for Short Term Goals: Upon d/c acute care setting. (all ongoing 11/12)  Short Term Goal 1: Bed Mob SBA/Supervision.  Short Term Goal 2: Tranfers with/without assist device SBA.  Short Term Goal 3: Amb with/without assist device ' SBA/CGA.  Patient Goals   Patient Goals : None stated at this time.       Education  Patient Education  Education Given To: Patient  Education Provided: Role of Therapy;Plan of Care;Transfer  Training;Equipment  Education Provided Comments: participation in functional mobility tasks, Need to call for assist.  Education Method: Verbal  Barriers to Learning: Cognition;Hearing  Education Outcome: Continued education needed      Therapy Time   Individual Concurrent Group Co-treatment   Time In 1249         Time Out 1315         Minutes 26         Timed Code Treatment Minutes: 26 Minutes       Aixa Yun, PT     Aixa Yun PT, DPT, CNS [phone] 11/12/24 1:59 PM

## 2024-11-12 NOTE — PLAN OF CARE
Problem: Discharge Planning  Goal: Discharge to home or other facility with appropriate resources  11/11/2024 2309 by Romana Garcia RN  Outcome: Progressing  Flowsheets (Taken 11/11/2024 1423 by Vicky Galan RN)  Discharge to home or other facility with appropriate resources:   Arrange for needed discharge resources and transportation as appropriate   Identify barriers to discharge with patient and caregiver   Identify discharge learning needs (meds, wound care, etc)  11/11/2024 0923 by Vicky Galan RN  Outcome: Progressing  Flowsheets (Taken 11/11/2024 0833)  Discharge to home or other facility with appropriate resources:   Arrange for needed discharge resources and transportation as appropriate   Identify discharge learning needs (meds, wound care, etc)   Identify barriers to discharge with patient and caregiver     Problem: Pain  Goal: Verbalizes/displays adequate comfort level or baseline comfort level  11/11/2024 2309 by Romana Garcia RN  Outcome: Progressing  Flowsheets  Taken 11/11/2024 1515 by Vicky Galan RN  Verbalizes/displays adequate comfort level or baseline comfort level:   Encourage patient to monitor pain and request assistance   Administer analgesics based on type and severity of pain and evaluate response   Assess pain using appropriate pain scale   Implement non-pharmacological measures as appropriate and evaluate response  Taken 11/11/2024 1100 by Vicky Galan RN  Verbalizes/displays adequate comfort level or baseline comfort level:   Assess pain using appropriate pain scale   Implement non-pharmacological measures as appropriate and evaluate response   Administer analgesics based on type and severity of pain and evaluate response   Encourage patient to monitor pain and request assistance  11/11/2024 0923 by Vicky Galan RN  Outcome: Progressing  Flowsheets (Taken 11/11/2024 0738)  Verbalizes/displays adequate comfort level or baseline comfort level:

## 2024-11-12 NOTE — PROGRESS NOTES
Messaged provider about pt HR going into 30's and going 2:1. Pt asleep but arousable and HR is currently NSR. Pt K was 3.4 and pt has poor po intake. EFFER-K attempted but pt has poor intake. Provider ordered oral K pill instead of EFFER-K. Pt threw up the oral K. 2 10 mEq IV given.     Pt continues to be lethargic and nauseated. Zofran given. Care ongoing.     Provider ordered ammonia cmp mag phos cbc and ua and cbc.     Care ongoing.    Electronically signed by JEANNE RASHID RN on 11/12/24 at 1:46 AM EST

## 2024-11-12 NOTE — PLAN OF CARE
Problem: Discharge Planning  Goal: Discharge to home or other facility with appropriate resources  11/12/2024 1221 by Mari Harry RN  Outcome: Progressing     Problem: Pain  Goal: Verbalizes/displays adequate comfort level or baseline comfort level  11/12/2024 1221 by Mari Harry RN  Outcome: Progressing     Problem: Safety - Adult  Goal: Free from fall injury  11/12/2024 1221 by Mari Harry RN  Outcome: Progressing     Problem: Skin/Tissue Integrity  Goal: Absence of new skin breakdown  Description: 1.  Monitor for areas of redness and/or skin breakdown  2.  Assess vascular access sites hourly  3.  Every 4-6 hours minimum:  Change oxygen saturation probe site  4.  Every 4-6 hours:  If on nasal continuous positive airway pressure, respiratory therapy assess nares and determine need for appliance change or resting period.  11/12/2024 1221 by Mari Harry RN  Outcome: Progressing     Problem: Respiratory - Adult  Goal: Achieves optimal ventilation and oxygenation  11/12/2024 1221 by Mari Harry RN  Outcome: Progressing     Problem: Cardiovascular - Adult  Goal: Maintains optimal cardiac output and hemodynamic stability  11/12/2024 1221 by Mari Harry RN  Outcome: Progressing     Problem: Cardiovascular - Adult  Goal: Absence of cardiac dysrhythmias or at baseline  11/12/2024 1221 by Mari Harry RN  Outcome: Progressing     Problem: Skin/Tissue Integrity - Adult  Goal: Skin integrity remains intact  11/12/2024 1221 by Mari Harry RN  Outcome: Progressing     Problem: Skin/Tissue Integrity - Adult  Goal: Incisions, wounds, or drain sites healing without S/S of infection  11/12/2024 1221 by Mari Harry RN  Outcome: Progressing     Problem: Skin/Tissue Integrity - Adult  Goal: Oral mucous membranes remain intact  11/12/2024 1221 by Mari Harry RN  Outcome: Progressing     Problem: Gastrointestinal - Adult  Goal: Minimal or absence of nausea and vomiting  11/12/2024 1221 by Mari Harry RN  Outcome:

## 2024-11-12 NOTE — PROGRESS NOTES
Pt has been progressively nauseous and vomiting all night. On the clock nausea meds have been given with little to no relief. Unable to give morning meds.     Electronically signed by JEANNE RASHID RN on 11/12/24 at 5:50 AM EST

## 2024-11-12 NOTE — PROGRESS NOTES
Clinical Pharmacy Note  Heparin Dosing       Lab Results   Component Value Date/Time    ANTIXAUHEP 0.35 11/12/2024 04:34 AM      Lab Results   Component Value Date/Time    HGB 12.6 11/12/2024 04:35 AM    HCT 36.9 11/12/2024 04:35 AM     11/12/2024 04:35 AM    INR 1.00 10/23/2024 01:35 AM       Current Infusion Rate: 1000 units/hr    Plan:  Bolus: 0 units  Rate: 1000 units/hr  Next anti-Xa level: 0600 11/13/24    Pharmacy will continue to monitor and adjust based on anti-Xa results.    Ami Tierney, PharmD

## 2024-11-12 NOTE — PROGRESS NOTES
Occupational Therapy  Facility/Department: [address] PROGRESSIVE CARE  Occupational Therapy Initial Assessment    Name: Nyla Kaur  : 1945  MRN: [id number]  Date of Service: 2024    Discharge Recommendations:  24 hour supervision or assist, 2-3 sessions per week, Patient would benefit from continued therapy after discharge, Home with Home health OT     Nyla Kaur scored a 19/24 on the AM-PAC ADL Inpatient form. Current research shows that an AM-PAC score of 18 or greater is typically associated with a discharge to the patient's home setting. Based on the patient's AM-PAC score, and their current ADL deficits, it is recommended that the patient have 2-3 sessions per week of Occupational Therapy at d/c to increase the patient's independence.  At this time, this patient demonstrates the endurance and safety to discharge home with home OT (home vs OP services) and a follow up treatment frequency of 2-3x/wk.   Please see assessment section for further patient specific details.    If patient discharges prior to next session this note will serve as a discharge summary.  Please see below for the latest assessment towards goals.       Patient Diagnosis(es): The primary encounter diagnosis was Closed head injury, initial encounter. Diagnoses of Post concussive encephalopathy, Hypertensive emergency, Nausea and vomiting, unspecified vomiting type, and Multiple subsegmental pulmonary emboli without acute cor pulmonale (HCC) were also pertinent to this visit.  Past Medical History:  has a past medical history of Anxiety, Balance disorder, GERD (gastroesophageal reflux disease), History of kidney stones, Hx of blood clots, Hyperlipidemia, Hypertension, Infection, Liver cyst, Olfactory nerve injury, Right ear injury, and Thyroid disease.  Past Surgical History:  has a past surgical history that includes Abdominoplasty (); Hysterectomy (); Facial cosmetic surgery (); Ovarian cyst surgery ();  Little  How much help for eating meals?: None  AM-PAC Inpatient Daily Activity Raw Score: 19  AM-PAC Inpatient ADL T-Scale Score : 40.22  ADL Inpatient CMS 0-100% Score: 42.8  ADL Inpatient CMS G-Code Modifier : CK    Goals  Short Term Goals  Time Frame for Short Term Goals: prior to d/c  Short Term Goal 1: SUP fxl tx and fxl mobility to LRAD in prep to complete ADL routine  Short Term Goal 2: SUP toileting  Short Term Goal 3: SUP LB dressing/bathing  Short Term Goal 4: SUP standing x5 minutes to complete ADL task to improve endurance/balance  Long Term Goals  Time Frame for Long Term Goals : STGs=LTGs  Patient Goals   Patient goals : pt did not state    Therapy Time   Individual Concurrent Group Co-treatment   Time In 1025         Time Out 1110         Minutes 45         Timed Code Treatment Minutes: 30 Minutes (15 minute eval)     Electronically signed by GAMAL Richardson on 11/12/2024 at 11:17 AM

## 2024-11-13 LAB
ANION GAP SERPL CALCULATED.3IONS-SCNC: 13 MMOL/L (ref 3–16)
BASOPHILS # BLD: 0 K/UL (ref 0–0.2)
BASOPHILS NFR BLD: 0.4 %
BUN SERPL-MCNC: 14 MG/DL (ref 7–20)
CALCIUM SERPL-MCNC: 8.3 MG/DL (ref 8.3–10.6)
CHLORIDE SERPL-SCNC: 108 MMOL/L (ref 99–110)
CO2 SERPL-SCNC: 20 MMOL/L (ref 21–32)
CREAT SERPL-MCNC: 0.5 MG/DL (ref 0.6–1.2)
DEPRECATED RDW RBC AUTO: 12.7 % (ref 12.4–15.4)
ECHO BSA: 1.7 M2
EOSINOPHIL # BLD: 0.1 K/UL (ref 0–0.6)
EOSINOPHIL NFR BLD: 1 %
GFR SERPLBLD CREATININE-BSD FMLA CKD-EPI: >90 ML/MIN/{1.73_M2}
GLUCOSE BLD-MCNC: 122 MG/DL (ref 70–99)
GLUCOSE BLD-MCNC: 126 MG/DL (ref 70–99)
GLUCOSE BLD-MCNC: 67 MG/DL (ref 70–99)
GLUCOSE SERPL-MCNC: 62 MG/DL (ref 70–99)
HCT VFR BLD AUTO: 37.9 % (ref 36–48)
HGB BLD-MCNC: 13.1 G/DL (ref 12–16)
LYMPHOCYTES # BLD: 1 K/UL (ref 1–5.1)
LYMPHOCYTES NFR BLD: 12.3 %
MCH RBC QN AUTO: 33.5 PG (ref 26–34)
MCHC RBC AUTO-ENTMCNC: 34.5 G/DL (ref 31–36)
MCV RBC AUTO: 97.1 FL (ref 80–100)
MONOCYTES # BLD: 0.7 K/UL (ref 0–1.3)
MONOCYTES NFR BLD: 7.8 %
NEUTROPHILS # BLD: 6.6 K/UL (ref 1.7–7.7)
NEUTROPHILS NFR BLD: 78.5 %
PERFORMED ON: ABNORMAL
PLATELET # BLD AUTO: 199 K/UL (ref 135–450)
PMV BLD AUTO: 9.7 FL (ref 5–10.5)
POTASSIUM SERPL-SCNC: 3.4 MMOL/L (ref 3.5–5.1)
RBC # BLD AUTO: 3.9 M/UL (ref 4–5.2)
SODIUM SERPL-SCNC: 141 MMOL/L (ref 136–145)
WBC # BLD AUTO: 8.4 K/UL (ref 4–11)

## 2024-11-13 PROCEDURE — 93970 EXTREMITY STUDY: CPT | Performed by: SURGERY

## 2024-11-13 PROCEDURE — 6370000000 HC RX 637 (ALT 250 FOR IP): Performed by: HOSPITALIST

## 2024-11-13 PROCEDURE — 36415 COLL VENOUS BLD VENIPUNCTURE: CPT

## 2024-11-13 PROCEDURE — 97530 THERAPEUTIC ACTIVITIES: CPT

## 2024-11-13 PROCEDURE — 97129 THER IVNTJ 1ST 15 MIN: CPT

## 2024-11-13 PROCEDURE — 6360000002 HC RX W HCPCS: Performed by: REGISTERED NURSE

## 2024-11-13 PROCEDURE — 85025 COMPLETE CBC W/AUTO DIFF WBC: CPT

## 2024-11-13 PROCEDURE — 94760 N-INVAS EAR/PLS OXIMETRY 1: CPT

## 2024-11-13 PROCEDURE — 80048 BASIC METABOLIC PNL TOTAL CA: CPT

## 2024-11-13 PROCEDURE — 2580000003 HC RX 258: Performed by: HOSPITALIST

## 2024-11-13 PROCEDURE — 97535 SELF CARE MNGMENT TRAINING: CPT

## 2024-11-13 PROCEDURE — 92526 ORAL FUNCTION THERAPY: CPT

## 2024-11-13 PROCEDURE — 2580000003 HC RX 258: Performed by: REGISTERED NURSE

## 2024-11-13 PROCEDURE — 97116 GAIT TRAINING THERAPY: CPT

## 2024-11-13 PROCEDURE — 2060000000 HC ICU INTERMEDIATE R&B

## 2024-11-13 PROCEDURE — 6360000002 HC RX W HCPCS: Performed by: HOSPITALIST

## 2024-11-13 RX ORDER — GLUCAGON 1 MG/ML
1 KIT INJECTION PRN
Status: DISCONTINUED | OUTPATIENT
Start: 2024-11-13 | End: 2024-11-15 | Stop reason: HOSPADM

## 2024-11-13 RX ORDER — DEXTROSE MONOHYDRATE 100 MG/ML
INJECTION, SOLUTION INTRAVENOUS CONTINUOUS PRN
Status: DISCONTINUED | OUTPATIENT
Start: 2024-11-13 | End: 2024-11-15 | Stop reason: HOSPADM

## 2024-11-13 RX ADMIN — POTASSIUM CHLORIDE 40 MEQ: 1500 TABLET, EXTENDED RELEASE ORAL at 14:20

## 2024-11-13 RX ADMIN — SODIUM CHLORIDE, PRESERVATIVE FREE 10 ML: 5 INJECTION INTRAVENOUS at 09:43

## 2024-11-13 RX ADMIN — Medication 1000 UNITS: at 09:43

## 2024-11-13 RX ADMIN — CARBIDOPA AND LEVODOPA 3 TABLET: 25; 100 TABLET ORAL at 17:54

## 2024-11-13 RX ADMIN — ONDANSETRON 4 MG: 2 INJECTION INTRAMUSCULAR; INTRAVENOUS at 04:14

## 2024-11-13 RX ADMIN — CARBIDOPA AND LEVODOPA 3 TABLET: 25; 100 TABLET ORAL at 09:41

## 2024-11-13 RX ADMIN — PANTOPRAZOLE SODIUM 40 MG: 40 TABLET, DELAYED RELEASE ORAL at 09:41

## 2024-11-13 RX ADMIN — CARBIDOPA AND LEVODOPA 3 TABLET: 25; 100 TABLET ORAL at 14:20

## 2024-11-13 RX ADMIN — CARBIDOPA AND LEVODOPA 3 TABLET: 25; 100 TABLET ORAL at 20:17

## 2024-11-13 RX ADMIN — SODIUM CHLORIDE: 9 INJECTION, SOLUTION INTRAVENOUS at 02:00

## 2024-11-13 RX ADMIN — ASPIRIN 81 MG: 81 TABLET, CHEWABLE ORAL at 09:41

## 2024-11-13 RX ADMIN — APIXABAN 10 MG: 5 TABLET, FILM COATED ORAL at 09:42

## 2024-11-13 RX ADMIN — DEXTROSE MONOHYDRATE 125 ML: 100 INJECTION, SOLUTION INTRAVENOUS at 07:38

## 2024-11-13 RX ADMIN — APIXABAN 10 MG: 5 TABLET, FILM COATED ORAL at 20:17

## 2024-11-13 RX ADMIN — ATORVASTATIN CALCIUM 20 MG: 20 TABLET, FILM COATED ORAL at 20:17

## 2024-11-13 RX ADMIN — WATER 1000 MG: 1 INJECTION INTRAMUSCULAR; INTRAVENOUS; SUBCUTANEOUS at 04:15

## 2024-11-13 RX ADMIN — SODIUM CHLORIDE, PRESERVATIVE FREE 10 ML: 5 INJECTION INTRAVENOUS at 20:25

## 2024-11-13 RX ADMIN — SODIUM CHLORIDE: 9 INJECTION, SOLUTION INTRAVENOUS at 07:24

## 2024-11-13 RX ADMIN — SERTRALINE 100 MG: 100 TABLET, FILM COATED ORAL at 20:17

## 2024-11-13 RX ADMIN — LEVOTHYROXINE SODIUM 25 MCG: 0.03 TABLET ORAL at 09:43

## 2024-11-13 RX ADMIN — AMLODIPINE BESYLATE 10 MG: 10 TABLET ORAL at 09:42

## 2024-11-13 NOTE — PROGRESS NOTES
[facility]  Hypoglycemia Event and Prevention Plan      NAME: Nyla Kaur  MEDICAL RECORD NUMBER:  [id number]  AGE: 79 y.o.   GENDER: female  : 1945  EPISODE DATE:  2024     Data     Recent Labs     11/10/24  1616 24  0203 24  2157 24  0731   POCGLU 110* 78 80 67*        Latest Reference Range & Units 24 05:09   Glucose 70 - 99 mg/dL 62 (L)   (L): Data is abnormally low    HgBA1c:    Lab Results   Component Value Date/Time    LABA1C 5.5 10/24/2024 05:27 AM       BUN/Creatinine:    Lab Results   Component Value Date/Time    BUN 14 2024 05:09 AM    CREATININE 0.5 2024 05:09 AM       Medications  Scheduled Medications:   apixaban  10 mg Oral BID    Followed by    [START ON 2024] apixaban  5 mg Oral BID    scopolamine  1 patch TransDERmal Q72H    cefTRIAXone (ROCEPHIN) IV  1,000 mg IntraVENous Q24H    [Held by provider] rivastigmine  6 mg Oral BID    aspirin  81 mg Oral Daily    Vitamin D  1,000 Units Oral Daily    carbidopa-levodopa  3 tablet Oral 4x Daily    levothyroxine  25 mcg Oral Daily    atorvastatin  20 mg Oral Nightly    pantoprazole  40 mg Oral QAM AC    sertraline  100 mg Oral Nightly    amLODIPine  10 mg Oral Daily    sodium chloride flush  5-40 mL IntraVENous 2 times per day       Diet  Current diet/supplement order: ADULT DIET; Dysphagia - Soft and Bite Sized  ADULT ORAL NUTRITION SUPPLEMENT; Breakfast, Lunch, Dinner; Clear Liquid Oral Supplement     Recorded PO: PO Meals Eaten (%): 1 - 25% last meal in flowsheets      Action      Pt on Hypolist. No history of DM. A1c, recent and stable. Not on anti diabetic medications. Outreach call to staff. PCA to check glucose due to am labs (see above). Pt has hypoglycemic order set prn if needed.     Treatment (mariano all that apply): Dextrose    Physician Notified of event: Yes, On-Call provider Dr. Ferreira- asking for day time provider to be notified. (0747am) Secure message sent to [facility],  Jd Carlton MD (0806am)    Achieved POCT Blood Glucose greater than 70 mg/dl: No: staff monitoring and treating per protocol.       Electronically signed by Cassie Ocasio RN on 11/13/2024 at 7:43 AM

## 2024-11-13 NOTE — PROGRESS NOTES
Physical Therapy  Facility/Department: [address] PROGRESSIVE CARE  Physical Therapy Daily Treatment Session    Name: Nyla Kaur  : 1945  MRN: [id number]  Date of Service: 2024    Discharge Recommendations: Nyla Kaur scored a 15/24 on the AM-PAC short mobility form. Current research shows that an AM-PAC score of 17 or less is typically not associated with a discharge to the patient's home setting. Based on the patient's AM-PAC score and their current functional mobility deficits, it is recommended that the patient have 3-5 sessions per week of Physical Therapy at d/c to increase the patient's independence.  Please see assessment section for further patient specific details.    If patient discharges prior to next session this note will serve as a discharge summary.  Please see below for the latest assessment towards goals.    Subacute/Skilled Nursing Facility, Patient would benefit from continued therapy after discharge   PT Equipment Recommendations  Equipment Needed:  (Defer to next level of care)      Patient Diagnosis(es): The primary encounter diagnosis was Closed head injury, initial encounter. Diagnoses of Post concussive encephalopathy, Hypertensive emergency, Nausea and vomiting, unspecified vomiting type, and Multiple subsegmental pulmonary emboli without acute cor pulmonale (HCC) were also pertinent to this visit.  Past Medical History:  has a past medical history of Anxiety, Balance disorder, GERD (gastroesophageal reflux disease), History of kidney stones, Hx of blood clots, Hyperlipidemia, Hypertension, Infection, Liver cyst, Olfactory nerve injury, Right ear injury, and Thyroid disease.  Past Surgical History:  has a past surgical history that includes Abdominoplasty (); Hysterectomy (); Facial cosmetic surgery (); Ovarian cyst surgery (); Uterine fibroid surgery (); back surgery (); Gastric Band (); Colonoscopy; Facial cosmetic surgery ([id number]);  (Decreased eccentric control, attempts to sit prior to lining up with chair)  Bed to Chair: Minimal assistance (Use of RW via stand step transfer)  Ambulation  Surface: Level tile  Device: Rolling Walker  Assistance: Minimal assistance  Quality of Gait: Short, shuffling steps, decreased speed, no overt losses of balance, posterior lean throughout, easily distracted by her environment, running into objects on the L with RW- requires assistance to manage  Distance: 40'     Balance  Sitting - Static:  (SBA)  Standing - Static:  (Min A with B UE support on RW- posterior lean)  Standing - Dynamic:  (Min-mod A with B UE support on RW- posterior lean)     Other Therapeutic Interventions  Pt assisted with gown change.     AM-PAC - Mobility  AM-PAC Basic Mobility - Inpatient   How much help is needed turning from your back to your side while in a flat bed without using bedrails?: A Little  How much help is needed moving from lying on your back to sitting on the side of a flat bed without using bedrails?: A Little  How much help is needed moving to and from a bed to a chair?: A Little  How much help is needed standing up from a chair using your arms?: A Lot  How much help is needed walking in hospital room?: A Little  How much help is needed climbing 3-5 steps with a railing?: Total  AM-PAC Inpatient Mobility Raw Score : 15  AM-PAC Inpatient T-Scale Score : 39.45  Mobility Inpatient CMS 0-100% Score: 57.7  Mobility Inpatient CMS G-Code Modifier : CK    Goals  Short Term Goals  Time Frame for Short Term Goals: Upon d/c acute care setting.  Short Term Goal 1: Bed Mob SBA/Supervision.  Short Term Goal 2: Tranfers with/without assist device SBA.  Short Term Goal 3: Amb with/without assist device ' SBA/CGA.  Patient Goals   Patient Goals : None stated at this time.     *No goals met 11/13/24    Education  Patient Education  Education Given To: Patient  Education Provided: Role of Therapy;Transfer Training  Education Provided

## 2024-11-13 NOTE — PROGRESS NOTES
[place]2.[place]    [facility] [place]      Name:  Nyla Kaur /Age/Sex: 1945  (79 y.o. female)   MRN & CSN:  [id number] & [id number] Encounter Date/Time: 2024 8:57 AM EDT   Location:  [id number] PCP: Kendra Acevedo DO     Attending:Jd Cox MD       Hospital Day: 6      HPI :         Subjective:     No vomiting today, nausea is better, p.o. intake remains poor, had low blood sugar this morning      Review of Systems:      Pertinent positives and negatives discussed in HPI    Objective:     Intake/Output Summary (Last 24 hours) at 2024 0850  Last data filed at 2024 0459  Gross per 24 hour   Intake --   Output 800 ml   Net -800 ml      Vitals:   Vitals:    24 0203 24 0457 24 0505 24 0745   BP: (!) 130/47 (!) 165/63  (!) 138/46   Pulse:  64  62   Resp:  19  17   Temp:  98.4 °F (36.9 °C)  97.6 °F (36.4 °C)   TempSrc:  Oral  Oral   SpO2:  93%  97%   Weight:   62.6 kg (138 lb 0.1 oz)    Height:             Physical Exam:      Physical Exam Performed:    BP (!) 138/46   Pulse 62   Temp 97.6 °F (36.4 °C) (Oral)   Resp 17   Ht 1.676 m (5' 5.98\")   Wt 62.6 kg (138 lb 0.1 oz)   SpO2 97%   BMI 22.29 kg/m²     General appearance: No apparent distress, appears stated age and cooperative.  Respiratory:  Normal respiratory effort. Clear to auscultation, bilaterally without Rales/Wheezes/Rhonchi.  Cardiovascular: Regular rate and rhythm with normal S1/S2 without murmurs, rubs or gallops.  Abdomen: Soft, non-tender, non-distended with normal bowel sounds.  Neurologic:  Neurovascularly intact without any focal sensory/motor deficits. Cranial nerves: II-XII intact, grossly non-focal.        Medications:   Medications:    apixaban  10 mg Oral BID    Followed by    [START ON 2024] apixaban  5 mg Oral BID    scopolamine  1 patch TransDERmal Q72H    cefTRIAXone (ROCEPHIN) IV  1,000 mg IntraVENous Q24H    [Held by provider] rivastigmine  6 mg Oral BID    aspirin   as words and phrases that may be inappropriate. If there are any questions or concerns, please feel free to contact the dictating provider for clarification.

## 2024-11-13 NOTE — CARE COORDINATION
Patient sleeping but met with spouse & son at bedside. Discussed PT/OT recs for skilled nursing facility (SNF). Spouse says his wife would prefer to return home but if needed he's is agreeable to SNF at discharge. SNF list given. Spouse would like referrals to [facility], [facility], & [facility]. Referrals made. Will need pre cert once have an accepting facility.   If patient improves to return home, spouse is open to home care.    The Plan for Transition of Care is related to the following treatment goals: strengthening    The patient representative spouse Chivo was provided with a choice of provider and agrees   with the discharge plan. [x] Yes [] No    Freedom of choice list was provided with basic dialogue that supports the patient's individualized plan of care/goals, treatment preferences and shares the quality data associated with the providers. [x] Yes [] No    Electronically signed by Mary Guzman RN Case Management on 11/13/2024 at 4:02 PM

## 2024-11-13 NOTE — PROGRESS NOTES
Notified provider of pt's continued decreased LOC and need for repeated physical stimulation to arouse. Provider ordered labs, and fluids for pt due to pt not tolerating po fluids, meds, or food.     This RN did not pass evening meds due to pt's LOC.     Provider also stated that they will come to bedside to evaluate. Care ongoing.       Electronically signed by JEANNE RASHID RN on 11/12/24 at 10:44 PM EST

## 2024-11-13 NOTE — PROGRESS NOTES
Occupational Therapy  Facility/Department: [address] PROGRESSIVE CARE  Occupational Therapy Treatment Note    Name: Nyla Kaur  : 1945  MRN: [id number]  Date of Service: 2024    Discharge Recommendations:  3-5 sessions per week, Continue to assess pending progress        Nyla Kaur scored a 16/24 on the AM-PAC ADL Inpatient form. Current research shows that an AM-PAC score of 17 or less is typically not associated with a discharge to the patient's home setting. Based on the patient's AM-PAC score and their current ADL deficits, it is recommended that the patient have 3-5 sessions per week of Occupational Therapy at d/c to increase the patient's independence.  Please see assessment section for further patient specific details.    If patient discharges prior to next session this note will serve as a discharge summary.  Please see below for the latest assessment towards goals.     Patient Diagnosis(es): The primary encounter diagnosis was Closed head injury, initial encounter. Diagnoses of Post concussive encephalopathy, Hypertensive emergency, Nausea and vomiting, unspecified vomiting type, and Multiple subsegmental pulmonary emboli without acute cor pulmonale (HCC) were also pertinent to this visit.  Past Medical History:  has a past medical history of Anxiety, Balance disorder, GERD (gastroesophageal reflux disease), History of kidney stones, Hx of blood clots, Hyperlipidemia, Hypertension, Infection, Liver cyst, Olfactory nerve injury, Right ear injury, and Thyroid disease.  Past Surgical History:  has a past surgical history that includes Abdominoplasty (); Hysterectomy (); Facial cosmetic surgery (); Ovarian cyst surgery (); Uterine fibroid surgery (); back surgery (); Gastric Band (); Colonoscopy; Facial cosmetic surgery ([id number]); Cosmetic surgery; Cholecystectomy; Endoscopy, colon, diagnostic; eye surgery; other surgical history (2015); Colonoscopy (N/A,

## 2024-11-13 NOTE — PROGRESS NOTES
Call initiated by: Nursing staff:  11/12/24 @ [phone],    Call addressed around: 11/12/24 @ 222, 2234. Seen/assessed pt at bedside on 11/13/24 @ 0038**Pt did require continuous stimuli-however woke enough to tell me that she was in the hospital. When asked \"Is it June?\" Pt said no and told me it was December. Pt reoriented to date.    Reason for call: Pt Lethargic-RN reports worsening LOC also asking about IV fluids since pt not drinking and eating. Inquiring about possible repeat CT scan of head.    Orders placed: No new orders    SARA Calvillo - CNP

## 2024-11-13 NOTE — PROGRESS NOTES
Facility/Department: [address] PROGRESSIVE CARE   DYSPHAGIA THERAPY and SPEECH / LANGUAGE / COGNITIVE-LINGUISTIC TREATMENT    Patient: Nyla Kaur   : 1945   MRN: [id number]      Treatment Date: 2024      Admitting Dx:   Post concussive encephalopathy [F07.81]  Concussion syndrome [F07.81]  Hypertensive emergency [I16.1]  Closed head injury, initial encounter [S09.90XA]  Nausea and vomiting, unspecified vomiting type [R11.2]   has a past medical history of Anxiety, Balance disorder, GERD (gastroesophageal reflux disease), History of kidney stones, blood clots, Hyperlipidemia, Hypertension, Infection (2015), Liver cyst, Olfactory nerve injury, Right ear injury (20 yrs ago), and Thyroid disease.   has a past surgical history that includes Abdominoplasty (); Hysterectomy (); Facial cosmetic surgery (); Ovarian cyst surgery (); Uterine fibroid surgery (); back surgery (); Gastric Band (); Colonoscopy; Facial cosmetic surgery (); Cosmetic surgery; Cholecystectomy; Endoscopy, colon, diagnostic; eye surgery; other surgical history (2015); Colonoscopy (N/A, 2019); and Cystoscopy (Left, 1/3/2023).  Allergies: medication allergies noted  Speech Therapy History: None per EMR  Dysphagia History: Pt previously evaluated [facility], recommended regular diet with thin liquids   GI history: No recent/relevant  ENT history: Follows with ENT for SNHL   History/Prior Level of Function: Living Status: Pt resides with spouse in their home. Baseline diet: regular/thin, medications crushed in puree.                      Onset: 2024     SUBJECTIVE     Subjective: Pt visited at bedside, upright in chair, agreeable to tx session. Reports ongoing nausea, per pt report is baseline. Requesting diet upgrade.     TREATMENT SUMMARY / IMPRESSIONS     Dysphagia Impressions: Pt presents with an increased oropharyngeal dysphagia secondary to comorbidities. No overt s/s of aspiration  recommendations: Dysphagia  Frequency of treatment: 2-5 times/week    Anticipated Discharge Recommendations: Discharge recommendations to be determined pending ongoing follow-up during acute care stay      GOALS / PLAN     GOALS:  SHORT TERM DYSPHAGIA GOALS  Pt will functionally tolerate recommended diet with no overt clinical s/s of aspiration>Ongoing, no overt s/s of aspiration    Pt will advance to least restrictive diet as indicated>Ongoing, recommend upgrade to regular diet    Patient will demonstrate carryover of specific compensatory swallow strategies (ie, postures, techniques,...) independently>Ongoing, pt IND completing this date     SHORT TERM SPEECH/LANGUAGE/COGNITIVE GOALS  Patient will demonstrate functional auditory processing with MIN cues>Goal met, d/c. Pt functioning at baseline     Patient will demonstrate improved short-term recall with MIN cues>Goal met, d/c. Reviewed strategies with pt    Patient will demonstrate improved verbal problem solving with MIN cues>Goal met, d/c. Pt functioning at baseline     CURRENT ENCOUNTER DIAGNOSTICS/COURSE OF ADMISSION   H&P: Pt is an 80 y/o F with PMHx significant for PD, GERD, HTN, and anxiety who presents s/p fall. Reduced LIDIA since fall and emesis.      Consults: Neurology     Imaging:  Head CT:  11/8/24 \"IMPRESSION:  1. No acute intracranial abnormality.  2. Left posterior parietal scalp soft tissue hematoma. No underlying  calvarial fracture.  3. No acute osseous abnormality of the cervical spine.  4. Multilevel degenerative changes of the cervical spine.\"     MRI 11/9/24 \"IMPRESSION:  Cerebral atrophy.  Severe chronic small vessel ischemic changes.  No acute  brain parenchymal abnormality.\"     Current Diet Level (prior to evaluation): NPO  Respiratory Status: Room Air     DATA     Pain:  Did not state  Respiratory Status: Room Air   Vision: Adequate for assessment/tx needs  Hearing: Adequate for assessment/tx needs  Dentition: Adequate  Vocal Quality:

## 2024-11-13 NOTE — PROGRESS NOTES
Patient is extremely lethargic. Requires sternal rubs to get response from her. Complaints of nausea are regular. Patch was placed behind the left ear today. Likes promethazine better for her nausea which is an IM medication only. Family has been present this afternoon. They want an Epley maneuver attempted to ease her nausea which is done by PT according to family. It has helped in the past. Patient has chronic nausea. Her Parkinson's causes a delayed reaction normally per family members. Call light is within reach. Will continue to monitor patient.

## 2024-11-14 LAB
ANION GAP SERPL CALCULATED.3IONS-SCNC: 7 MMOL/L (ref 3–16)
BACTERIA UR CULT: ABNORMAL
BASOPHILS # BLD: 0 K/UL (ref 0–0.2)
BASOPHILS NFR BLD: 0.4 %
BUN SERPL-MCNC: 13 MG/DL (ref 7–20)
CALCIUM SERPL-MCNC: 8.4 MG/DL (ref 8.3–10.6)
CHLORIDE SERPL-SCNC: 108 MMOL/L (ref 99–110)
CO2 SERPL-SCNC: 24 MMOL/L (ref 21–32)
CREAT SERPL-MCNC: 0.5 MG/DL (ref 0.6–1.2)
DEPRECATED RDW RBC AUTO: 12.4 % (ref 12.4–15.4)
EOSINOPHIL # BLD: 0.1 K/UL (ref 0–0.6)
EOSINOPHIL NFR BLD: 1.9 %
GFR SERPLBLD CREATININE-BSD FMLA CKD-EPI: >90 ML/MIN/{1.73_M2}
GLUCOSE BLD-MCNC: 115 MG/DL (ref 70–99)
GLUCOSE BLD-MCNC: 93 MG/DL (ref 70–99)
GLUCOSE SERPL-MCNC: 91 MG/DL (ref 70–99)
HCT VFR BLD AUTO: 35 % (ref 36–48)
HGB BLD-MCNC: 11.9 G/DL (ref 12–16)
LYMPHOCYTES # BLD: 1.2 K/UL (ref 1–5.1)
LYMPHOCYTES NFR BLD: 15.3 %
MCH RBC QN AUTO: 32.8 PG (ref 26–34)
MCHC RBC AUTO-ENTMCNC: 34.1 G/DL (ref 31–36)
MCV RBC AUTO: 96.3 FL (ref 80–100)
MONOCYTES # BLD: 0.7 K/UL (ref 0–1.3)
MONOCYTES NFR BLD: 9.4 %
NEUTROPHILS # BLD: 5.6 K/UL (ref 1.7–7.7)
NEUTROPHILS NFR BLD: 73 %
ORGANISM: ABNORMAL
PERFORMED ON: ABNORMAL
PERFORMED ON: NORMAL
PLATELET # BLD AUTO: 188 K/UL (ref 135–450)
PMV BLD AUTO: 9.3 FL (ref 5–10.5)
POTASSIUM SERPL-SCNC: 3.4 MMOL/L (ref 3.5–5.1)
RBC # BLD AUTO: 3.64 M/UL (ref 4–5.2)
SODIUM SERPL-SCNC: 139 MMOL/L (ref 136–145)
WBC # BLD AUTO: 7.7 K/UL (ref 4–11)

## 2024-11-14 PROCEDURE — 2060000000 HC ICU INTERMEDIATE R&B

## 2024-11-14 PROCEDURE — 6370000000 HC RX 637 (ALT 250 FOR IP): Performed by: HOSPITALIST

## 2024-11-14 PROCEDURE — 80048 BASIC METABOLIC PNL TOTAL CA: CPT

## 2024-11-14 PROCEDURE — 97530 THERAPEUTIC ACTIVITIES: CPT

## 2024-11-14 PROCEDURE — 2580000003 HC RX 258: Performed by: REGISTERED NURSE

## 2024-11-14 PROCEDURE — 94760 N-INVAS EAR/PLS OXIMETRY 1: CPT

## 2024-11-14 PROCEDURE — 36415 COLL VENOUS BLD VENIPUNCTURE: CPT

## 2024-11-14 PROCEDURE — 85025 COMPLETE CBC W/AUTO DIFF WBC: CPT

## 2024-11-14 PROCEDURE — 92526 ORAL FUNCTION THERAPY: CPT

## 2024-11-14 PROCEDURE — 6360000002 HC RX W HCPCS: Performed by: REGISTERED NURSE

## 2024-11-14 PROCEDURE — 2580000003 HC RX 258: Performed by: HOSPITALIST

## 2024-11-14 PROCEDURE — 51798 US URINE CAPACITY MEASURE: CPT

## 2024-11-14 PROCEDURE — 97535 SELF CARE MNGMENT TRAINING: CPT

## 2024-11-14 PROCEDURE — 97110 THERAPEUTIC EXERCISES: CPT

## 2024-11-14 RX ORDER — POTASSIUM CHLORIDE 750 MG/1
10 TABLET, EXTENDED RELEASE ORAL DAILY
DISCHARGE
Start: 2024-11-14

## 2024-11-14 RX ORDER — POLYETHYLENE GLYCOL 3350 17 G/17G
17 POWDER, FOR SOLUTION ORAL DAILY PRN
DISCHARGE
Start: 2024-11-14 | End: 2024-12-14

## 2024-11-14 RX ADMIN — CARBIDOPA AND LEVODOPA 3 TABLET: 25; 100 TABLET ORAL at 12:55

## 2024-11-14 RX ADMIN — PANTOPRAZOLE SODIUM 40 MG: 40 TABLET, DELAYED RELEASE ORAL at 05:40

## 2024-11-14 RX ADMIN — WATER 1000 MG: 1 INJECTION INTRAMUSCULAR; INTRAVENOUS; SUBCUTANEOUS at 04:18

## 2024-11-14 RX ADMIN — CARBIDOPA AND LEVODOPA 3 TABLET: 25; 100 TABLET ORAL at 20:56

## 2024-11-14 RX ADMIN — SERTRALINE 100 MG: 100 TABLET, FILM COATED ORAL at 20:56

## 2024-11-14 RX ADMIN — ATORVASTATIN CALCIUM 20 MG: 20 TABLET, FILM COATED ORAL at 20:56

## 2024-11-14 RX ADMIN — SODIUM CHLORIDE, PRESERVATIVE FREE 10 ML: 5 INJECTION INTRAVENOUS at 09:44

## 2024-11-14 RX ADMIN — APIXABAN 10 MG: 5 TABLET, FILM COATED ORAL at 20:56

## 2024-11-14 RX ADMIN — APIXABAN 10 MG: 5 TABLET, FILM COATED ORAL at 09:43

## 2024-11-14 RX ADMIN — AMLODIPINE BESYLATE 10 MG: 10 TABLET ORAL at 09:43

## 2024-11-14 RX ADMIN — RIVASTIGMINE TARTRATE 6 MG: 1.5 CAPSULE ORAL at 21:02

## 2024-11-14 RX ADMIN — CARBIDOPA AND LEVODOPA 3 TABLET: 25; 100 TABLET ORAL at 16:35

## 2024-11-14 RX ADMIN — ASPIRIN 81 MG: 81 TABLET, CHEWABLE ORAL at 09:44

## 2024-11-14 RX ADMIN — POTASSIUM CHLORIDE 40 MEQ: 1500 TABLET, EXTENDED RELEASE ORAL at 05:45

## 2024-11-14 RX ADMIN — Medication 1000 UNITS: at 09:43

## 2024-11-14 RX ADMIN — LEVOTHYROXINE SODIUM 25 MCG: 0.03 TABLET ORAL at 05:41

## 2024-11-14 RX ADMIN — CARBIDOPA AND LEVODOPA 3 TABLET: 25; 100 TABLET ORAL at 09:43

## 2024-11-14 NOTE — PLAN OF CARE
Problem: Discharge Planning  Goal: Discharge to home or other facility with appropriate resources  11/13/2024 2117 by Romana Garcia RN  Outcome: Progressing  11/13/2024 1510 by Sami Hoffman RN  Outcome: Progressing     Problem: Pain  Goal: Verbalizes/displays adequate comfort level or baseline comfort level  11/13/2024 2117 by Romana Garcia RN  Outcome: Progressing  11/13/2024 1510 by Sami Hoffman RN  Outcome: Progressing     Problem: Safety - Adult  Goal: Free from fall injury  11/13/2024 2117 by Romana Garcia RN  Outcome: Progressing  11/13/2024 1510 by Sami Hoffman RN  Outcome: Progressing     Problem: Skin/Tissue Integrity  Goal: Absence of new skin breakdown  Description: 1.  Monitor for areas of redness and/or skin breakdown  2.  Assess vascular access sites hourly  3.  Every 4-6 hours minimum:  Change oxygen saturation probe site  4.  Every 4-6 hours:  If on nasal continuous positive airway pressure, respiratory therapy assess nares and determine need for appliance change or resting period.  11/13/2024 2117 by Romana Garcia RN  Outcome: Progressing  11/13/2024 1510 by Sami Hoffman RN  Outcome: Progressing     Problem: Respiratory - Adult  Goal: Achieves optimal ventilation and oxygenation  11/13/2024 2117 by Romana Garcia RN  Outcome: Progressing  11/13/2024 1510 by Sami Hoffman RN  Outcome: Progressing     Problem: Cardiovascular - Adult  Goal: Maintains optimal cardiac output and hemodynamic stability  11/13/2024 2117 by Romana Garcia RN  Outcome: Progressing  11/13/2024 1510 by Sami Hoffman RN  Outcome: Progressing  Goal: Absence of cardiac dysrhythmias or at baseline  11/13/2024 2117 by Romana Garcia RN  Outcome: Progressing  11/13/2024 1510 by Sami Hoffman RN  Outcome: Progressing     Problem: Skin/Tissue Integrity - Adult  Goal: Skin integrity remains intact  11/13/2024 2117 by Romana Garcia RN  Outcome: Progressing  11/13/2024 1510 by Sami Hoffman RN  Outcome:

## 2024-11-14 NOTE — DISCHARGE INSTR - COC
Continuity of Care Form    Patient Name: Nyla Kaur   :  1945  MRN:  [id number]    Admit date:  2024  Discharge date:  11/15/2024    Code Status Order: Full Code   Advance Directives:   Advance Care Flowsheet Documentation             Admitting Physician:  Sara Campoverde Jr., MD  PCP: Kendra Acevedo DO    Discharging Nurse: CIERRA Gallardo  Discharging Hospital Unit/Room#: Z8C-8080/5280-01  Discharging Unit Phone Number: [phone]    Emergency Contact:   Extended Emergency Contact Information  Primary Emergency Contact: Julián Kaur  Address: [address] [place] [address]  Home Phone: [phone]  Mobile Phone: [phone]  Relation: Spouse  Secondary Emergency Contact: Brandt Suarez  Work Phone: [phone]  Relation: Child    Past Surgical History:  Past Surgical History:   Procedure Laterality Date    ABDOMINOPLASTY      also cosmetic surgery on arms    BACK SURGERY      exc. cyst lumbar    CHOLECYSTECTOMY      COLONOSCOPY      COLONOSCOPY N/A 2019    COLONOSCOPY DIAGNOSTIC performed by Brandt Amin MD at [facility] ENDOSCOPY    COSMETIC SURGERY      breast reduction    CYSTOSCOPY Left 1/3/2023    CYSTOSCOPY LEFT STENT PLACEMENT performed by Matthew Nettles MD at [facility] OR    ENDOSCOPY, COLON, DIAGNOSTIC      EYE SURGERY      cataracts    FACIAL COSMETIC SURGERY      also breast reduction    FACIAL COSMETIC SURGERY       FACELIFT, BROWLIFT, UPPER AND LOWER BLEPHEROPLASTY AND    GASTRIC BAND      also port removal subsequently (3/2015)    HYSTERECTOMY (CERVIX STATUS UNKNOWN)      OTHER SURGICAL HISTORY  2015    LAPAROSCOPIC GASTRIC BAND REMOVAL         OVARIAN CYST SURGERY  1987    UTERINE FIBROID SURGERY  1974       Immunization History:   Immunization History   Administered Date(s) Administered    COVID-19, PFIZER Bivalent, DO NOT Dilute, (age 12y+), IM, 30 mcg/0.3 mL 2023    COVID-19, PFIZER, (age  days: 4517       Incision 05/28/15 Abdomen Mid (Active)   Number of days: 3457        Elimination:  Continence:   Bowel: Yes  Bladder: occasionally  Urinary Catheter: None   Colostomy/Ileostomy/Ileal Conduit: No       Date of Last BM: 11/15    Intake/Output Summary (Last 24 hours) at 11/14/2024 1002  Last data filed at 11/14/2024 0555  Gross per 24 hour   Intake 480 ml   Output 1740 ml   Net -1260 ml     I/O last 3 completed shifts:  In: 580 [P.O.:580]  Out: 2540 [Urine:2540]    Safety Concerns:     History of Falls (last 30 days) and At Risk for Falls    Impairments/Disabilities:      Parkison's, favors right side    Nutrition Therapy:  Current Nutrition Therapy:   - Oral Diet:  General and benefits from GI [place] when nauseated    Routes of Feeding: Oral  Liquids: Thin Liquids  Daily Fluid Restriction: no  Last Modified Barium Swallow with Video (Video Swallowing Test): not done    Treatments at the Time of Hospital Discharge:   Respiratory Treatments: n/a  Oxygen Therapy:  occasionally requires 1-2L at night  Ventilator:    - No ventilator support    Rehab Therapies: Physical Therapy and Occupational Therapy  Weight Bearing Status/Restrictions: No weight bearing restrictions  Other Medical Equipment (for information only, NOT a DME order):  martin-stedy   Other Treatments: n/a    Patient's personal belongings (please select all that are sent with patient):  Jewelry ( took jewelry home with him)    RN SIGNATURE:  Electronically signed by Sami Hoffman RN on 11/15/24 at 3:59 PM EST    CASE MANAGEMENT/SOCIAL WORK SECTION    Inpatient Status Date: 11/8/2024    Readmission Risk Assessment Score:  Readmission Risk              Risk of Unplanned Readmission:  16           Discharging to Facility/ Agency   Name: Amol Ivey  Address:  [address] [id number]   Phone:  [phone]  Fax:  [phone]    / signature: Electronically signed by Anna Hendricks RN

## 2024-11-14 NOTE — CARE COORDINATION
ADDENDUM:   CIERRA RENTERIA spoke with Paulina from [facility] and she is not able to accept because they do not have a bed.   Electronically signed by Anna Hendricks RN on 11/14/2024 at 11:56 AM    Discharge order noted, awaiting approval from skilled nursing facility. RN CM had message on voicemail from [facility] and they are able to accept patient. RN CM left HIPAA complaint message for [facility] and [facility] to see if they are able to accept patient. RN CM phoned  Julián and made him aware of the above. He would like [facility] or [facility] because they are closer to his home. RN CM will continue to follow.   Electronically signed by Anna Hendricks RN on 11/14/2024 at 11:49 AM  [phone]

## 2024-11-14 NOTE — DISCHARGE SUMMARY
V2.0  Discharge Summary    Name:  Nyla Kaur /Age/Sex: 1945 (79 y.o. female)   Admit Date: 2024  Discharge Date: 24    MRN & CSN:  [id number] & [id number] Encounter Date and Time 24 10:02 AM EST    Attending:  Jd Cox MD Discharging Provider: Jd Cox MD       Hospital Course:     Brief HPI: Nyla Kaur is a 79 y.o. female with hx of Parkinson's Disease follows with  neurology, HTN, HLD, thyroid disease, see below for additional. She was just admitted and seen by neurology 10/23-10/25/24 for recurrent episodic unresponsiveness without definitive loss of consciousness, reported left facial droop and slurred speech. MRI brain was negative. There was suspicion of possible 2/2 effects to her neurodegenerative disorder. She had not yet followed up with neurology as OP.      She returns for admission on 24. The patient had reported a mechanical fall, falling backwards and hitting her head around 0030 early this calendar day. Since then she has had decreased level of consciousness, confusion, multiple episodes of vomiting concerning for concussion. She did have a head hematoma.      GCS was 2-3-4 on ED encounter. CT head was completed and negative for acute intracranial findings. Additionally noted to have hypertensive emergency. She was admitted to the ICU.     Brief Problem Based Course:     Closed head injury s/p fall  Postconcussion syndrome--persistent altered mental status/drowsiness status post head injury--improved, CT head without acute intracranial issues, has scalp hematoma,  MRI brain negative     Strokelike symptoms..  Patient reported transient right-sided numbness 11/10 code stroke was called with negative CT head..  MRI brain negative     Acute pulmonary embolism  Likely provoked by limited mobility prior to admission  This was an incidental finding on CTA head and neck done yesterday for stroke eval, CT chest subsequently confirmed multifocal  reasonably achievable.; CT of the cervical spine was performed without the administration of intravenous contrast. Multiplanar reformatted images are provided for review. Automated exposure control, iterative reconstruction, and/or weight based adjustment of the mA/kV was utilized to reduce the radiation dose to as low as reasonably achievable. COMPARISON: 10/23/2024 HISTORY: ORDERING SYSTEM PROVIDED HISTORY: Fall TECHNOLOGIST PROVIDED HISTORY: Reason for exam:->Fall Has a \"code stroke\" or \"stroke alert\" been called?->No Decision Support Exception - unselect if not a suspected or confirmed emergency medical condition->Emergency Medical Condition (MA); ORDERING SYSTEM PROVIDED HISTORY: s/p trauma TECHNOLOGIST PROVIDED HISTORY: Reason for exam:->s/p trauma Decision Support Exception - unselect if not a suspected or confirmed emergency medical condition->Emergency Medical Condition (MA) FINDINGS: CT HEAD: BRAIN/VENTRICLES: There is no acute intracranial hemorrhage, mass effect or midline shift. No abnormal extra-axial fluid collection.  The gray-white differentiation is maintained without evidence of an acute infarct.  There is no evidence of hydrocephalus. Patchy white matter low attenuation is identified within the periventricular and subcortical white matter. Generalized cortical atrophy. Intracranial atherosclerosis. ORBITS: Orbits appear unremarkable.  No acute abnormality. PARANASAL SINUSES: No acute air-fluid level seen in the visualized paranasal sinuses or mastoid air cells. SOFT TISSUE/CALVARIUM: There is a left posterior parietal scalp soft tissue hematoma.  No underlying calvarial fracture is identified. CT CERVICAL SPINE: BONES/ALIGNMENT: No C1 ring fracture.  No occipital condyle acute fracture is identified.  Straightening of the normal cervical lordosis. DEGENERATIVE CHANGES: Multilevel degenerative disc disease and facet arthropathy is identified.  Degenerative changes are greatest at the level of

## 2024-11-14 NOTE — PROGRESS NOTES
Physical Therapy  Facility/Department: [address] PROGRESSIVE CARE  Physical Therapy Treatment session  Name: Nyla Kaur  : 1945  MRN: [id number]  Date of Service: 2024    Discharge Recommendations:  Subacute/Skilled Nursing Facility, Patient would benefit from continued therapy after discharge   PT Equipment Recommendations  Equipment Needed:  (Defer to next level of care)  Other: Will monitor for potential equipt needs.    Nyla Kaur scored a  on the AM-PAC short mobility form. Current research shows that an AM-PAC score of 17 or less is typically not associated with a discharge to the patient's home setting. Based on the patient's AM-PAC score and their current functional mobility deficits, it is recommended that the patient have 3-5 sessions per week of Physical Therapy at d/c to increase the patient's independence.  Please see assessment section for further patient specific details.    If patient discharges prior to next session this note will serve as a discharge summary.  Please see below for the latest assessment towards goals.     Patient Diagnosis(es): The primary encounter diagnosis was Closed head injury, initial encounter. Diagnoses of Post concussive encephalopathy, Hypertensive emergency, Nausea and vomiting, unspecified vomiting type, and Multiple subsegmental pulmonary emboli without acute cor pulmonale (HCC) were also pertinent to this visit.  Past Medical History:  has a past medical history of Anxiety, Balance disorder, GERD (gastroesophageal reflux disease), History of kidney stones, Hx of blood clots, Hyperlipidemia, Hypertension, Infection, Liver cyst, Olfactory nerve injury, Right ear injury, and Thyroid disease.  Past Surgical History:  has a past surgical history that includes Abdominoplasty (); Hysterectomy (); Facial cosmetic surgery (); Ovarian cyst surgery (); Uterine fibroid surgery (); back surgery (); Gastric Band (); Colonoscopy;  while in a flat bed without using bedrails?: A Little  How much help is needed moving from lying on your back to sitting on the side of a flat bed without using bedrails?: A Little  How much help is needed moving to and from a bed to a chair?: A Lot  How much help is needed standing up from a chair using your arms?: A Lot  How much help is needed walking in hospital room?: Total  How much help is needed climbing 3-5 steps with a railing?: Total  AM-PAC Inpatient Mobility Raw Score : 12  AM-PAC Inpatient T-Scale Score : 35.33  Mobility Inpatient CMS 0-100% Score: 68.66  Mobility Inpatient CMS G-Code Modifier : CL         Goals  Short Term Goals  Time Frame for Short Term Goals: Upon d/c acute care setting.  Short Term Goal 1: Bed Mob SBA/Supervision.  Short Term Goal 2: Tranfers with/without assist device SBA.  Short Term Goal 3: Amb with/without assist device ' SBA/CGA.  Patient Goals   Patient Goals : None stated at this time.       Education  Patient Education  Education Given To: Patient  Education Provided: Role of Therapy;Transfer Training  Education Provided Comments: General safety, proper use of RW, safe transfers  Education Method: Verbal  Barriers to Learning: Cognition;Hearing  Education Outcome: Continued education needed      Therapy Time   Individual Concurrent Group Co-treatment   Time In 1208         Time Out 1232         Minutes 24         Timed Code Treatment Minutes: 24 Minutes       Aixa Yun, PT     Aixa Yun PT, DPT, CNS [id number] 11/14/24 12:48 PM

## 2024-11-14 NOTE — PROGRESS NOTES
[facility]  SLP DYSPHAGIA THERAPY    Patient: Nyla Kaur   : 1945   MRN: [id number]    Treatment Date: 2024   Admitting Diagnosis:   Post concussive encephalopathy [F07.81]  Concussion syndrome [F07.81]  Hypertensive emergency [I16.1]  Closed head injury, initial encounter [S09.90XA]  Nausea and vomiting, unspecified vomiting type [R11.2]   has a past medical history of Anxiety, Balance disorder, GERD (gastroesophageal reflux disease), History of kidney stones, blood clots, Hyperlipidemia, Hypertension, Infection (2015), Liver cyst, Olfactory nerve injury, Right ear injury (20 yrs ago), and Thyroid disease.   has a past surgical history that includes Abdominoplasty (); Hysterectomy (); Facial cosmetic surgery (); Ovarian cyst surgery (); Uterine fibroid surgery (); back surgery (); Gastric Band (); Colonoscopy; Facial cosmetic surgery (); Cosmetic surgery; Cholecystectomy; Endoscopy, colon, diagnostic; eye surgery; other surgical history (2015); Colonoscopy (N/A, 2019); and Cystoscopy (Left, 1/3/2023).  Allergies: medication allergies noted  Speech Therapy History: None per EMR  Dysphagia History: Pt previously evaluated [facility], recommended regular diet with thin liquids   GI history: No recent/relevant  ENT history: Follows with ENT for SNHL   History/Prior Level of Function: Living Status: Pt resides with spouse in their home. Baseline diet: regular/thin, medications crushed in puree.    Onset: 2024     CURRENT ENCOUNTER DIAGNOSTICS/COURSE OF ADMISSION     H&P: Pt is an 78 y/o F with PMHx significant for PD, GERD, HTN, and anxiety who presents s/p fall. Reduced LIDIA since fall and emesis.      Consults: Neurology     Imaging:  Head CT:  24 \"IMPRESSION:  1. No acute intracranial abnormality.  2. Left posterior parietal scalp soft tissue hematoma. No underlying  calvarial fracture.  3. No acute osseous abnormality of the cervical  spine.  4. Multilevel degenerative changes of the cervical spine.\"     MRI 11/9/24 \"IMPRESSION:  Cerebral atrophy.  Severe chronic small vessel ischemic changes.  No acute  brain parenchymal abnormality.\"     Current Diet Level (prior to evaluation): NPO  Respiratory Status: Room Air     SUBJECTIVE     Subjective: Pt seen at bedside, per RN, consumed 100% of breakfast tray. Cognitive linguistic goals d/c as family reports pt functioning at baseline.     TREATMENT SUMMARY / IMPRESSIONS     Dysphagia Impressions: Pt presents with an increased oropharyngeal dysphagia secondary to comorbidities. No overt s/s of aspiration appreciated across all trials of thin liquids and regular solids. Pt completed meal without nausea this date. Recommend continue regular GI bland diet with thin liquids. No further skilled ST services indicated. Please re-consult should status change.     Prognosis: good  Barriers to Progress: co-morbidities    RECOMMENDATIONS     Recommended Diet and Intervention 11/14/2024:  Diet Solids Recommendation:  Regular texture Liquid Consistency Recommendation:  Thin liquids   Recommended form of Meds: PO per patient preference     Compensatory Swallowing Strategies: Upright as possible with all PO intake , Small bites/sips , Eat/feed slowly  Eating Assistance Recommendation: Independent  Discharge Recommendations: No further follow-up appears indicated at this time.     GOALS / PLAN     PLAN OF CARE: Speech therapy for dysphagia tx 2-5 times/week    SHORT TERM DYSPHAGIA GOALS  Pt will functionally tolerate recommended diet with no overt clinical s/s of aspiration>Goal met, d/c. Pt tolerating diet without event.      Pt will advance to least restrictive diet as indicated>Goal met, d/c. Pt on regular diet     Patient will demonstrate carryover of specific compensatory swallow strategies (ie, postures, techniques,...) independently>Goal met, d/c.       DATA     Pain:  Did not state  Respiratory Status: Room

## 2024-11-14 NOTE — CARE COORDINATION
RN CM met with patient and  Julián at bedside. Made them both aware that [facility] does not have beds open. They both agreed to start pre-cert for insurance for [facility]. CIERRA RENTERIA obtained IMM.   CIERRA RENTERIA phoned Job from [facility] and she is starting pre-cert through insurance. RN CM will continue to follow.   Electronically signed by Anna Hendricks RN on 11/14/2024 at 2:28 PM  [phone]

## 2024-11-14 NOTE — PROGRESS NOTES
Occupational Therapy  Facility/Department: [address] PROGRESSIVE CARE  Occupational Therapy Daily Note  Name: Nyla Kaur  : 1945  MRN: [id number]  Date of Service: 2024    Discharge Recommendations:  3-5 sessions per week, Patient would benefit from continued therapy after discharge   Nyla Kaur scored a 16/24 on the AM-PAC ADL Inpatient form. Current research shows that an AM-PAC score of 17 or less is typically not associated with a discharge to the patient's home setting. Based on the patient's AM-PAC score and their current ADL deficits, it is recommended that the patient have 3-5 sessions per week of Occupational Therapy at d/c to increase the patient's independence.  Please see assessment section for further patient specific details.    If patient discharges prior to next session this note will serve as a discharge summary.  Please see below for the latest assessment towards goals.         Patient Diagnosis(es): The primary encounter diagnosis was Closed head injury, initial encounter. Diagnoses of Post concussive encephalopathy, Hypertensive emergency, Nausea and vomiting, unspecified vomiting type, and Multiple subsegmental pulmonary emboli without acute cor pulmonale (HCC) were also pertinent to this visit.  Past Medical History:  has a past medical history of Anxiety, Balance disorder, GERD (gastroesophageal reflux disease), History of kidney stones, Hx of blood clots, Hyperlipidemia, Hypertension, Infection, Liver cyst, Olfactory nerve injury, Right ear injury, and Thyroid disease.  Past Surgical History:  has a past surgical history that includes Abdominoplasty (); Hysterectomy (); Facial cosmetic surgery (); Ovarian cyst surgery (); Uterine fibroid surgery (); back surgery (); Gastric Band (); Colonoscopy; Facial cosmetic surgery ([id number]); Cosmetic surgery; Cholecystectomy; Endoscopy, colon, diagnostic; eye surgery; other surgical history (2015);

## 2024-11-15 VITALS
TEMPERATURE: 97.7 F | HEART RATE: 64 BPM | HEIGHT: 66 IN | WEIGHT: 137.79 LBS | RESPIRATION RATE: 22 BRPM | SYSTOLIC BLOOD PRESSURE: 148 MMHG | DIASTOLIC BLOOD PRESSURE: 61 MMHG | BODY MASS INDEX: 22.14 KG/M2 | OXYGEN SATURATION: 98 %

## 2024-11-15 LAB
ANION GAP SERPL CALCULATED.3IONS-SCNC: 9 MMOL/L (ref 3–16)
BUN SERPL-MCNC: 14 MG/DL (ref 7–20)
CALCIUM SERPL-MCNC: 9.2 MG/DL (ref 8.3–10.6)
CHLORIDE SERPL-SCNC: 107 MMOL/L (ref 99–110)
CO2 SERPL-SCNC: 25 MMOL/L (ref 21–32)
CREAT SERPL-MCNC: 0.5 MG/DL (ref 0.6–1.2)
GFR SERPLBLD CREATININE-BSD FMLA CKD-EPI: >90 ML/MIN/{1.73_M2}
GLUCOSE SERPL-MCNC: 93 MG/DL (ref 70–99)
POTASSIUM SERPL-SCNC: 3.7 MMOL/L (ref 3.5–5.1)
SODIUM SERPL-SCNC: 141 MMOL/L (ref 136–145)

## 2024-11-15 PROCEDURE — 6370000000 HC RX 637 (ALT 250 FOR IP): Performed by: HOSPITALIST

## 2024-11-15 PROCEDURE — 94760 N-INVAS EAR/PLS OXIMETRY 1: CPT

## 2024-11-15 PROCEDURE — 80048 BASIC METABOLIC PNL TOTAL CA: CPT

## 2024-11-15 PROCEDURE — 36415 COLL VENOUS BLD VENIPUNCTURE: CPT

## 2024-11-15 PROCEDURE — 97530 THERAPEUTIC ACTIVITIES: CPT

## 2024-11-15 RX ADMIN — ONDANSETRON 4 MG: 4 TABLET, ORALLY DISINTEGRATING ORAL at 11:58

## 2024-11-15 RX ADMIN — Medication 1000 UNITS: at 08:37

## 2024-11-15 RX ADMIN — CARBIDOPA AND LEVODOPA 3 TABLET: 25; 100 TABLET ORAL at 17:04

## 2024-11-15 RX ADMIN — ASPIRIN 81 MG: 81 TABLET, CHEWABLE ORAL at 08:37

## 2024-11-15 RX ADMIN — RIVASTIGMINE TARTRATE 6 MG: 1.5 CAPSULE ORAL at 08:51

## 2024-11-15 RX ADMIN — CARBIDOPA AND LEVODOPA 3 TABLET: 25; 100 TABLET ORAL at 13:08

## 2024-11-15 RX ADMIN — LEVOTHYROXINE SODIUM 25 MCG: 0.03 TABLET ORAL at 06:09

## 2024-11-15 RX ADMIN — PANTOPRAZOLE SODIUM 40 MG: 40 TABLET, DELAYED RELEASE ORAL at 06:09

## 2024-11-15 RX ADMIN — AMLODIPINE BESYLATE 10 MG: 10 TABLET ORAL at 08:36

## 2024-11-15 RX ADMIN — APIXABAN 10 MG: 5 TABLET, FILM COATED ORAL at 08:36

## 2024-11-15 RX ADMIN — CARBIDOPA AND LEVODOPA 3 TABLET: 25; 100 TABLET ORAL at 08:37

## 2024-11-15 NOTE — CARE COORDINATION
Case Management Discharge Note          Date / Time of Note: 11/15/2024 3:56 PM                  Patient Name: Nyla Kaur   YOB: 1945  Diagnosis: Post concussive encephalopathy [F07.81]  Concussion syndrome [F07.81]  Hypertensive emergency [I16.1]  Closed head injury, initial encounter [S09.90XA]  Nausea and vomiting, unspecified vomiting type [R11.2]   Date / Time: 11/8/2024  2:35 AM    Financial:  Payor: [employer] MEDICARE / Plan: ANIT[employer] MEDICARE-ADVANTAGE PPO / Product Type: Medicare /      Pharmacy:    [facility] [id number] [facility] [address] [phone] - F [phone]  [id number] [facility] [address]  Phone: [phone] Fax: [phone]      Assistance purchasing medications?: Potential Assistance Purchasing Medications: No  Assistance provided by Case Management: None at this time    DISCHARGE Disposition: Skilled Nursing Facility (SNF)    Nursing Facility:   Discharging to Facility/ Agency   Name: [facility]  Address:  [address] [place] [address]   Phone:  [phone]  Fax:  [phone]     LOC at discharge: Skilled Nursing  SILVER Completed: Yes             NURSING REPORT NUMBER: [phone]               NURSING FAX NUMBER: [phone]    Notification completed in HENS/PAS?:  Yes : CM has completed HENS online through secure website for SNF admission at [facility].   Document ID #: [id number]    Referrals made at DISCHARGE for outpatient continued care:  Not Applicable    Transportation:  Transportation PLAN for discharge: EMS transportation   Mode of Transport: Ambulance stretcher - BLS  Reason for medical transport: Severe muscular weakness and de-conditioned state due to TIA and requires ambulance transport due to Generalized weakness, decreased endurance, fall risk   Name of Transport Company: [employer]  Phone: [phone]  Time of Transport: 2000    Transport form completed: Yes    IMM  Completed:   Yes, Case management has presented and reviewed IMM letter #2.       IMM Letter date given:: 11/14/24   .   Patient and/or family/POA verbalized understanding of their medicare rights and appeal process if needed. Patient and/or family/POA has signed, initialed and placed the date and time on IMM letter #2 on the the appropriate lines. Copy of letter offered and they are aware that the original copy of IMM letter #2 is available prior to discharge from the paper chart on the unit.  Electronic documentation has been entered into epic for IMM letter #2 and original paper copy has been added to the paper chart at the nurses station.     Additional CM Notes: Discharge order noted. Patient will go to a [facility] skilled nursing facility. CIERRA RENTERIA spoke with Karine, from [facility], and pre-cert is back. RN CM tried to set up trip for 5pm, however round trip was not able to  until 8pm. RN CM spoke with family they are aware. CIERRA RENTERIA spoke with Karine from [facility] and she is aware and bedside CIERRA Gallardo is aware. Dr. Norwood was notified of patient's pre-cert is back and patient planned for discharge.     The Plan for Transition of Care is related to the following treatment goals of Post concussive encephalopathy [F07.81]  Concussion syndrome [F07.81]  Hypertensive emergency [I16.1]  Closed head injury, initial encounter [S09.90XA]  Nausea and vomiting, unspecified vomiting type [R11.2]    The Patient and/or patient representative Nyla and her family were provided with a choice of provider and agrees with the discharge plan Yes    Freedom of choice list was provided with basic dialogue that supports the patient's individualized plan of care/goals and shares the quality data associated with the providers. Yes    Anna Hendricks RN  [facility]   Case Management Department  Ph: [phone]  Fax: [phone]

## 2024-11-15 NOTE — CARE COORDINATION
CIERRA RENTERIA spoke with Karine from [facility] and the pre-cert is back good from 11/15/2024- 11/21/2024. RN CM went to bedside and discussed with patient, , and son at bedside. They are aware that the patient is able to go to [facility] and that the insurance has approved. The  is concerned about the patient being nauseous, CIERRA RENTERIA will let CIERRA Gallardo know so she can address. CIERRA RENTERIA spoke with CIERRA Gallardo and she will address and let RN CM know when concerns are addressed. CIERRA RENTERIA will continue to follow.   Electronically signed by Anna Hendricks RN on 11/15/2024 at 3:30 PM  [phone]

## 2024-11-15 NOTE — PROGRESS NOTES
Comprehensive Nutrition Assessment    Type and Reason for Visit:  Reassess    Nutrition Recommendations/Plan:   Continue regular diet; GI [place]  Continue Ensure Clear BID  Continue to monitor PO intake and nutritional adequacy     Malnutrition Assessment:  Malnutrition Status:  No malnutrition (11/11/24 0195)    Context:  Acute Illness     Findings of the 6 clinical characteristics of malnutrition:  Energy Intake:  Unable to assess  Weight Loss:  No weight loss     Body Fat Loss:  No body fat loss     Muscle Mass Loss:  No muscle mass loss    Fluid Accumulation:  No fluid accumulation     Strength:  Not Performed    Nutrition Assessment:    F/U: pt advanced from dysphagia diet to regular diet; GI bland. PO intake has improved since last assessment, at ~%. Noted 5 lb wt decrease in 4 days. Continues on clear liquid supplement. Pt had eyes closed, not responding to name at first, was experiencing nausea this AM. Spoke with family who said diet was getting a bit better, seemed down today from nausea and possibly vomiting this morning. Pt was given Zofran which she stated helped a bit, but nausea has came back. Family thinks it may be due to Exelon medication for neurological function, as she was not experiencing it while off of it. Did not seem to drink her Ensure clear but encouraged it with decreased PO intake. Continue to monitor PO intake and nutritional adequacy.    Nutrition Related Findings:    Creatinine 0.5 L. LBM noted on 11/14. Wound Type: None       Current Nutrition Intake & Therapies:    Average Meal Intake: 26-50%, 51-75%, %  Average Supplements Intake: Unable to assess  ADULT ORAL NUTRITION SUPPLEMENT; Breakfast, Lunch, Dinner; Clear Liquid Oral Supplement  ADULT DIET; Regular; GI [place] (GERD/Peptic Ulcer)    Anthropometric Measures:  Height: 167.6 cm (5' 5.98\")  Ideal Body Weight (IBW): 130 lbs (59 kg)    Admission Body Weight: 64.9 kg (143 lb)  Current Body Weight: 62.5 kg (137 lb 12.6

## 2024-11-15 NOTE — PLAN OF CARE
Problem: Discharge Planning  Goal: Discharge to home or other facility with appropriate resources  11/15/2024 0314 by Katelyn Hilton RN  Outcome: Progressing     Problem: Pain  Goal: Verbalizes/displays adequate comfort level or baseline comfort level  11/15/2024 0314 by Katelyn Hilton RN  Outcome: Progressing     Problem: Safety - Adult  Goal: Free from fall injury  11/15/2024 0314 by Katelyn Hilton RN  Outcome: Progressing     Problem: Skin/Tissue Integrity  Goal: Absence of new skin breakdown  Description: 1.  Monitor for areas of redness and/or skin breakdown  2.  Assess vascular access sites hourly  3.  Every 4-6 hours minimum:  Change oxygen saturation probe site  4.  Every 4-6 hours:  If on nasal continuous positive airway pressure, respiratory therapy assess nares and determine need for appliance change or resting period.  11/15/2024 0314 by Katelyn Hilton RN  Outcome: Progressing     Problem: Respiratory - Adult  Goal: Achieves optimal ventilation and oxygenation  11/15/2024 0314 by Katelyn Hilton RN  Outcome: Progressing     Problem: Cardiovascular - Adult  Goal: Maintains optimal cardiac output and hemodynamic stability  11/15/2024 0314 by Katelyn Hilton RN  Outcome: Progressing     Problem: Cardiovascular - Adult  Goal: Absence of cardiac dysrhythmias or at baseline  11/15/2024 0314 by Katelyn Hilton RN  Outcome: Progressing     Problem: Skin/Tissue Integrity - Adult  Goal: Skin integrity remains intact  11/15/2024 0314 by Katelyn Hilton RN  Outcome: Progressing     Problem: Skin/Tissue Integrity - Adult  Goal: Incisions, wounds, or drain sites healing without S/S of infection  11/15/2024 0314 by Katelyn Hilton RN  Outcome: Progressing     Problem: Skin/Tissue Integrity - Adult  Goal: Oral mucous membranes remain intact  11/15/2024 0314 by Katelyn Hilton RN  Outcome: Progressing     Problem: Gastrointestinal - Adult  Goal: Minimal or absence of nausea and  vomiting  11/15/2024 0314 by Katelyn Hilton RN  Outcome: Progressing     Problem: Gastrointestinal - Adult  Goal: Maintains or returns to baseline bowel function  11/15/2024 0314 by Katelyn Hilton RN  Outcome: Progressing     Problem: Gastrointestinal - Adult  Goal: Maintains adequate nutritional intake  11/15/2024 0314 by Katelyn Hilton RN  Outcome: Progressing     Problem: Metabolic/Fluid and Electrolytes - Adult  Goal: Electrolytes maintained within normal limits  11/15/2024 0314 by Katelyn Hilton RN  Outcome: Progressing     Problem: Metabolic/Fluid and Electrolytes - Adult  Goal: Hemodynamic stability and optimal renal function maintained  11/15/2024 0314 by Katelyn Hilton RN  Outcome: Progressing     Problem: Metabolic/Fluid and Electrolytes - Adult  Goal: Glucose maintained within prescribed range  11/15/2024 0314 by Katelyn Hilton RN  Outcome: Progressing     Problem: Hematologic - Adult  Goal: Maintains hematologic stability  11/15/2024 0314 by Katelyn Hilton RN  Outcome: Progressing     Problem: Nutrition Deficit:  Goal: Optimize nutritional status  11/15/2024 0314 by Katelyn Hilton RN  Outcome: Progressing

## 2024-11-15 NOTE — CARE COORDINATION
Discharge order noted. Pre-cert pending for [facility]. CIERRA RENTERIA spoke with Job who verified that the pre-cert is pending, auth #[id number]. RN CM did leave HIPAA complaint message for  Julián to update him on pre-cert pending. RN CM did complete HENS #[id number]. RN CM will continue to follow.   Electronically signed by Anna Hendricks RN on 11/15/2024 at 12:52 PM  [phone]

## 2024-11-15 NOTE — DISCHARGE SUMMARY
ALKPHOS 74     Lipids:   Lab Results   Component Value Date/Time    CHOL 137 10/24/2024 05:27 AM    HDL 47 10/24/2024 05:27 AM    HDL 58 05/23/2012 01:00 PM    TRIG 90 10/24/2024 05:27 AM     Hemoglobin A1C:   Lab Results   Component Value Date/Time    LABA1C 5.5 10/24/2024 05:27 AM     TSH: No results found for: \"TSH\"  Troponin: No results found for: \"TROPONINT\"  Lactic Acid: No results for input(s): \"LACTA\" in the last 72 hours.  BNP:   Recent Labs     11/12/24 2239   PROBNP 168     UA:  Lab Results   Component Value Date/Time    NITRU Negative 11/12/2024 03:23 AM    COLORU Yellow 11/12/2024 03:23 AM    PHUR 7.0 11/12/2024 03:23 AM    PHUR 6.0 01/02/2023 10:36 PM    WBCUA 176 11/12/2024 03:23 AM    RBCUA 29 11/12/2024 03:23 AM    MUCUS Present 11/08/2024 08:41 AM    BACTERIA 2+ 11/12/2024 03:23 AM    CLARITYU Clear 11/12/2024 03:23 AM    LEUKOCYTESUR LARGE 11/12/2024 03:23 AM    UROBILINOGEN 1.0 11/12/2024 03:23 AM    BILIRUBINUR Negative 11/12/2024 03:23 AM    BLOODU MODERATE 11/12/2024 03:23 AM    GLUCOSEU Negative 11/12/2024 03:23 AM    KETUA 80 11/12/2024 03:23 AM    AMORPHOUS Present 11/12/2024 03:23 AM     Urine Cultures:   Lab Results   Component Value Date/Time    LABURIN >100,000 CFU/ml 11/12/2024 03:23 AM     Blood Cultures: No results found for: \"BC\"  No results found for: \"BLOODCULT2\"  Organism:   Lab Results   Component Value Date/Time    ORG Escherichia coli 11/12/2024 03:23 AM       Time Spent Discharging patient 50 minutes    Electronically signed by Yves Norwood MD on 11/15/2024 at 4:09 PM

## 2024-11-15 NOTE — PROGRESS NOTES
Physical Therapy  Facility/Department: [address] PROGRESSIVE CARE  Daily Treatment Note  NAME: Nyla Kaur  : 1945  MRN: [id number]    Date of Service: 11/15/2024    Discharge Recommendations:  Subacute/Skilled Nursing Facility, Patient would benefit from continued therapy after discharge   PT Equipment Recommendations  Other: Will monitor for potential equipt needs.    Patient Diagnosis(es): The primary encounter diagnosis was Closed head injury, initial encounter. Diagnoses of Post concussive encephalopathy, Hypertensive emergency, Nausea and vomiting, unspecified vomiting type, and Multiple subsegmental pulmonary emboli without acute cor pulmonale (HCC) were also pertinent to this visit.    Assessment  Assessment: Pt agreeable to activity with encouragement.  Able to complete transfers and short-distance ambulation with walker, CGA.  Pt requires additional therapy in the acute setting to improve strength, balance, endurance, and independence with mobility tasks.  Pt would benefit from continued therapy at low-moderate frequency upon D/C to continue to address these deficits.     Nyla Kaur scored a 17/24 on the AM-PAC short mobility form. Current research shows that an AM-PAC score of 17 or less is typically not associated with a discharge to the patient's home setting. Based on the patient's AM-PAC score and their current functional mobility deficits, it is recommended that the patient have 3-5 sessions per week of Physical Therapy at d/c to increase the patient's independence.  Please see assessment section for further patient specific details.    If patient discharges prior to next session this note will serve as a discharge summary.  Please see below for the latest assessment towards goals.       Other: Will monitor for potential equipt needs.    Plan  Physical Therapy Plan  General Plan: 3-5 times per week  Current Treatment Recommendations: Strengthening;Therapeutic activities;Balance

## 2024-11-16 ENCOUNTER — HOSPITAL ENCOUNTER (EMERGENCY)
Age: 79
Discharge: HOME | End: 2024-11-16
Payer: MEDICARE

## 2024-11-16 VITALS
HEART RATE: 66 BPM | WEIGHT: 138.01 LBS | DIASTOLIC BLOOD PRESSURE: 60 MMHG | SYSTOLIC BLOOD PRESSURE: 134 MMHG | OXYGEN SATURATION: 99 % | TEMPERATURE: 97.9 F | RESPIRATION RATE: 20 BRPM | BODY MASS INDEX: 22.29 KG/M2

## 2024-11-16 DIAGNOSIS — R04.0 LEFT-SIDED EPISTAXIS: Primary | ICD-10-CM

## 2024-11-16 DIAGNOSIS — Z79.01 CHRONIC ANTICOAGULATION: ICD-10-CM

## 2024-11-16 PROCEDURE — 99283 EMERGENCY DEPT VISIT LOW MDM: CPT

## 2024-11-16 PROCEDURE — 94760 N-INVAS EAR/PLS OXIMETRY 1: CPT

## 2024-11-16 PROCEDURE — 6370000000 HC RX 637 (ALT 250 FOR IP): Performed by: PHYSICIAN ASSISTANT

## 2024-11-16 RX ORDER — OXYMETAZOLINE HYDROCHLORIDE 0.05 G/100ML
2 SPRAY NASAL ONCE
Status: COMPLETED | OUTPATIENT
Start: 2024-11-16 | End: 2024-11-16

## 2024-11-16 RX ADMIN — OXYMETAZOLINE HCL 2 SPRAY: 0.05 SPRAY NASAL at 12:56

## 2024-11-16 ASSESSMENT — ENCOUNTER SYMPTOMS
SHORTNESS OF BREATH: 0
ABDOMINAL PAIN: 0
DIARRHEA: 0
VOICE CHANGE: 0
SORE THROAT: 0
CHEST TIGHTNESS: 0
COUGH: 0
NAUSEA: 0
VOMITING: 0
TROUBLE SWALLOWING: 0

## 2024-11-16 ASSESSMENT — LIFESTYLE VARIABLES
HOW MANY STANDARD DRINKS CONTAINING ALCOHOL DO YOU HAVE ON A TYPICAL DAY: PATIENT DOES NOT DRINK
HOW OFTEN DO YOU HAVE A DRINK CONTAINING ALCOHOL: NEVER

## 2024-11-16 ASSESSMENT — PAIN SCALES - GENERAL: PAINLEVEL_OUTOF10: 0

## 2024-11-16 NOTE — PROGRESS NOTES
Report given to Catherine at [facility]. All questions answered and Pt in route to the facility with transport team.

## 2024-11-16 NOTE — ED PROVIDER NOTES
symptoms FINDINGS: BRAIN/VENTRICLES: There is mild parenchymal volume loss.  There is mild to moderate ventriculomegaly, disproportionate to the degree of volume loss, likely with superimposed mild communicating hydrocephalus, stable.  There is periventricular white matter low attenuation, likely related to moderate chronic microvascular disease.  There is no acute intracranial hemorrhage, mass effect or midline shift.  No abnormal extra-axial fluid collection.  The gray-white differentiation is maintained without evidence of an acute infarct. ORBITS: The visualized portion of the orbits demonstrate no acute abnormality. SINUSES: The visualized paranasal sinuses and mastoid air cells demonstrate no acute abnormality. SOFT TISSUES/SKULL:  No acute abnormality of the visualized skull.  There is soft tissue swelling of the left parietal scalp.     No acute intracranial abnormality. Mild parenchymal volume loss. Mild to moderate ventriculomegaly, disproportionate to the degree of volume loss, likely with superimposed mild communicating hydrocephalus, stable. Moderate chronic microvascular disease.       No results found.    PROCEDURES   Unless otherwise noted below, none     Procedures    CRITICAL CARE TIME (.cctime)       PAST MEDICAL HISTORY      has a past medical history of Anxiety, Balance disorder, GERD (gastroesophageal reflux disease), History of kidney stones, blood clots, Hyperlipidemia, Hypertension, Infection (11/2015), Liver cyst, Olfactory nerve injury, Right ear injury (20 yrs ago), and Thyroid disease.     Chronic Conditions affecting Care: None    EMERGENCY DEPARTMENT COURSE and DIFFERENTIAL DIAGNOSIS/MDM:   Vitals:    Vitals:    11/16/24 1215   BP: 134/60   Pulse: 66   Resp: 20   Temp: 97.9 °F (36.6 °C)   TempSrc: Oral   SpO2: 100%   Weight: 62.6 kg (138 lb 0.1 oz)       Patient was given the following medications:  Medications   oxymetazoline (AFRIN) 0.05 % nasal spray 2 spray (has no administration

## 2024-11-20 ENCOUNTER — HOSPITAL ENCOUNTER (EMERGENCY)
Age: 79
Discharge: HOME | End: 2024-11-20
Attending: EMERGENCY MEDICINE
Payer: MEDICARE

## 2024-11-20 ENCOUNTER — APPOINTMENT (OUTPATIENT)
Dept: CT IMAGING | Age: 79
End: 2024-11-20
Payer: MEDICARE

## 2024-11-20 ENCOUNTER — APPOINTMENT (OUTPATIENT)
Dept: GENERAL RADIOLOGY | Age: 79
End: 2024-11-20
Payer: MEDICARE

## 2024-11-20 VITALS
RESPIRATION RATE: 20 BRPM | OXYGEN SATURATION: 95 % | TEMPERATURE: 98.5 F | SYSTOLIC BLOOD PRESSURE: 169 MMHG | DIASTOLIC BLOOD PRESSURE: 64 MMHG | HEART RATE: 72 BPM

## 2024-11-20 DIAGNOSIS — R11.2 NAUSEA AND VOMITING, UNSPECIFIED VOMITING TYPE: Primary | ICD-10-CM

## 2024-11-20 LAB
ALBUMIN SERPL-MCNC: 4.4 G/DL (ref 3.4–5)
ALBUMIN/GLOB SERPL: 1.7 {RATIO} (ref 1.1–2.2)
ALP SERPL-CCNC: 90 U/L (ref 40–129)
ALT SERPL-CCNC: <5 U/L (ref 10–40)
ANION GAP SERPL CALCULATED.3IONS-SCNC: 12 MMOL/L (ref 3–16)
AST SERPL-CCNC: 14 U/L (ref 15–37)
BASOPHILS # BLD: 0 K/UL (ref 0–0.2)
BASOPHILS NFR BLD: 0.4 %
BILIRUB SERPL-MCNC: 0.5 MG/DL (ref 0–1)
BUN SERPL-MCNC: 17 MG/DL (ref 7–20)
CALCIUM SERPL-MCNC: 9.9 MG/DL (ref 8.3–10.6)
CHLORIDE SERPL-SCNC: 104 MMOL/L (ref 99–110)
CO2 SERPL-SCNC: 25 MMOL/L (ref 21–32)
CREAT SERPL-MCNC: 0.6 MG/DL (ref 0.6–1.2)
DEPRECATED RDW RBC AUTO: 12.9 % (ref 12.4–15.4)
EKG ATRIAL RATE: 71 BPM
EKG DIAGNOSIS: NORMAL
EKG P AXIS: 39 DEGREES
EKG P-R INTERVAL: 178 MS
EKG Q-T INTERVAL: 452 MS
EKG QRS DURATION: 104 MS
EKG QTC CALCULATION (BAZETT): 491 MS
EKG R AXIS: -57 DEGREES
EKG T AXIS: 105 DEGREES
EKG VENTRICULAR RATE: 71 BPM
EOSINOPHIL # BLD: 0 K/UL (ref 0–0.6)
EOSINOPHIL NFR BLD: 0.1 %
GFR SERPLBLD CREATININE-BSD FMLA CKD-EPI: >90 ML/MIN/{1.73_M2}
GLUCOSE SERPL-MCNC: 121 MG/DL (ref 70–99)
HCT VFR BLD AUTO: 41.9 % (ref 36–48)
HGB BLD-MCNC: 14.1 G/DL (ref 12–16)
LACTATE BLDV-SCNC: 0.9 MMOL/L (ref 0.4–1.9)
LIPASE SERPL-CCNC: 26 U/L (ref 13–60)
LYMPHOCYTES # BLD: 0.7 K/UL (ref 1–5.1)
LYMPHOCYTES NFR BLD: 8.2 %
MCH RBC QN AUTO: 32.4 PG (ref 26–34)
MCHC RBC AUTO-ENTMCNC: 33.7 G/DL (ref 31–36)
MCV RBC AUTO: 96.2 FL (ref 80–100)
MONOCYTES # BLD: 0.3 K/UL (ref 0–1.3)
MONOCYTES NFR BLD: 4 %
NEUTROPHILS # BLD: 7.3 K/UL (ref 1.7–7.7)
NEUTROPHILS NFR BLD: 87.3 %
PLATELET # BLD AUTO: 229 K/UL (ref 135–450)
PMV BLD AUTO: 10.1 FL (ref 5–10.5)
POTASSIUM SERPL-SCNC: 3.9 MMOL/L (ref 3.5–5.1)
PROT SERPL-MCNC: 7 G/DL (ref 6.4–8.2)
RBC # BLD AUTO: 4.36 M/UL (ref 4–5.2)
SODIUM SERPL-SCNC: 141 MMOL/L (ref 136–145)
WBC # BLD AUTO: 8.3 K/UL (ref 4–11)

## 2024-11-20 PROCEDURE — 80053 COMPREHEN METABOLIC PANEL: CPT

## 2024-11-20 PROCEDURE — 83690 ASSAY OF LIPASE: CPT

## 2024-11-20 PROCEDURE — 85025 COMPLETE CBC W/AUTO DIFF WBC: CPT

## 2024-11-20 PROCEDURE — 96374 THER/PROPH/DIAG INJ IV PUSH: CPT

## 2024-11-20 PROCEDURE — 71045 X-RAY EXAM CHEST 1 VIEW: CPT

## 2024-11-20 PROCEDURE — 99285 EMERGENCY DEPT VISIT HI MDM: CPT

## 2024-11-20 PROCEDURE — 6360000004 HC RX CONTRAST MEDICATION: Performed by: EMERGENCY MEDICINE

## 2024-11-20 PROCEDURE — 93005 ELECTROCARDIOGRAM TRACING: CPT | Performed by: EMERGENCY MEDICINE

## 2024-11-20 PROCEDURE — 83605 ASSAY OF LACTIC ACID: CPT

## 2024-11-20 PROCEDURE — 74177 CT ABD & PELVIS W/CONTRAST: CPT

## 2024-11-20 PROCEDURE — 6360000002 HC RX W HCPCS: Performed by: EMERGENCY MEDICINE

## 2024-11-20 PROCEDURE — 70450 CT HEAD/BRAIN W/O DYE: CPT

## 2024-11-20 PROCEDURE — 93010 ELECTROCARDIOGRAM REPORT: CPT | Performed by: STUDENT IN AN ORGANIZED HEALTH CARE EDUCATION/TRAINING PROGRAM

## 2024-11-20 PROCEDURE — 72125 CT NECK SPINE W/O DYE: CPT

## 2024-11-20 RX ORDER — IOPAMIDOL 755 MG/ML
75 INJECTION, SOLUTION INTRAVASCULAR
Status: COMPLETED | OUTPATIENT
Start: 2024-11-20 | End: 2024-11-20

## 2024-11-20 RX ORDER — ONDANSETRON 4 MG/1
4 TABLET, FILM COATED ORAL EVERY 8 HOURS PRN
Qty: 20 TABLET | Refills: 0 | Status: SHIPPED | OUTPATIENT
Start: 2024-11-20

## 2024-11-20 RX ORDER — ONDANSETRON 2 MG/ML
4 INJECTION INTRAMUSCULAR; INTRAVENOUS ONCE
Status: COMPLETED | OUTPATIENT
Start: 2024-11-20 | End: 2024-11-20

## 2024-11-20 RX ORDER — DICYCLOMINE HYDROCHLORIDE 10 MG/1
10 CAPSULE ORAL
Qty: 360 CAPSULE | Refills: 0 | Status: SHIPPED | OUTPATIENT
Start: 2024-11-20

## 2024-11-20 RX ADMIN — ONDANSETRON 4 MG: 2 INJECTION INTRAMUSCULAR; INTRAVENOUS at 04:40

## 2024-11-20 RX ADMIN — IOPAMIDOL 75 ML: 755 INJECTION, SOLUTION INTRAVENOUS at 04:58

## 2024-11-20 NOTE — ED TRIAGE NOTES
Pt arrived to ED via [facility] for c/o N/V going on for 2 days. KUB done at facility, just got reads back shows possible SBO. Pt had 2 episodes of emesis during triage. Pt has hx of dementia bu answers orientation questions appropriately at this time.

## 2024-11-20 NOTE — ED PROVIDER NOTES
provider specified.    DISCHARGE MEDICATIONS:  New Prescriptions    No medications on file       DISCONTINUED MEDICATIONS:  Discontinued Medications    No medications on file              (Please note that portions of this note were completed with a voice recognition program.  Efforts were made to edit the dictations but occasionally words are mis-transcribed.)    Demarcus Nick MD (electronically signed)            Demarcus Nick MD  11/21/24 5540

## 2024-11-20 NOTE — ED NOTES
Report called to CIERRA Ivey at facility. All questions answered at this time. Updated about pick-up time from ED.

## 2024-11-20 NOTE — ED NOTES
Report to [facility] for patient to return to [facility]. Family at bedside and aware of plan.

## 2025-09-02 ENCOUNTER — HOSPITAL ENCOUNTER (OUTPATIENT)
Dept: WOMENS IMAGING | Age: 80
Discharge: HOME | End: 2025-09-02
Payer: MEDICARE

## 2025-09-02 VITALS — WEIGHT: 135 LBS | BODY MASS INDEX: 22.49 KG/M2 | HEIGHT: 65 IN

## 2025-09-02 DIAGNOSIS — Z12.31 VISIT FOR SCREENING MAMMOGRAM: ICD-10-CM

## 2025-09-02 PROCEDURE — 77063 BREAST TOMOSYNTHESIS BI: CPT

## (undated) DEVICE — SINGLE ACTION PUMPING SYSTEM

## (undated) DEVICE — GUIDEWIRE URO L150CM DIA0.038IN STD NIT HYDRPHLC STR TIP

## (undated) DEVICE — OPEN-END FLEXI-TIP URETERAL CATHETER: Brand: FLEXI-TIP

## (undated) DEVICE — SOLUTION IV IRRIG WATER 1000ML POUR BRL 2F7114

## (undated) DEVICE — GOWN SIRUS NONREIN XL W/TWL: Brand: MEDLINE INDUSTRIES, INC.

## (undated) DEVICE — SOLUTION IRRIG 3000ML STRL H2O USP UROMATIC PLAS CONT

## (undated) DEVICE — SYRINGE MED 10ML LUERLOCK TIP W/O SFTY DISP

## (undated) DEVICE — GLOVE ORANGE PI 8 1/2   MSG9085

## (undated) DEVICE — NITINOL STONE RETRIEVAL BASKET: Brand: ZERO TIP

## (undated) DEVICE — SEAL ENDOSCP INSTR BX PRT FOR ACC UPTO 3FR

## (undated) DEVICE — RENTAL LASER HOLMIUM HI WATT STNDBY DAILY MIN APPL

## (undated) DEVICE — CYSTOSCOPY: Brand: MEDLINE INDUSTRIES, INC.